# Patient Record
Sex: FEMALE | Race: WHITE | NOT HISPANIC OR LATINO | Employment: OTHER | URBAN - NONMETROPOLITAN AREA
[De-identification: names, ages, dates, MRNs, and addresses within clinical notes are randomized per-mention and may not be internally consistent; named-entity substitution may affect disease eponyms.]

---

## 2017-07-01 ENCOUNTER — HOSPITAL ENCOUNTER (EMERGENCY)
Facility: HOSPITAL | Age: 82
Discharge: HOME | End: 2017-07-01
Attending: FAMILY MEDICINE | Admitting: FAMILY MEDICINE
Payer: MEDICARE

## 2017-07-01 VITALS
SYSTOLIC BLOOD PRESSURE: 203 MMHG | RESPIRATION RATE: 16 BRPM | OXYGEN SATURATION: 94 % | TEMPERATURE: 98.1 F | HEART RATE: 78 BPM | DIASTOLIC BLOOD PRESSURE: 115 MMHG

## 2017-07-01 DIAGNOSIS — Z79.01 SUBTHERAPEUTIC ANTICOAGULATION: ICD-10-CM

## 2017-07-01 DIAGNOSIS — I10 ESSENTIAL HYPERTENSION, BENIGN: ICD-10-CM

## 2017-07-01 DIAGNOSIS — I48.0 PAROXYSMAL ATRIAL FIBRILLATION (H): ICD-10-CM

## 2017-07-01 DIAGNOSIS — Z51.81 SUBTHERAPEUTIC ANTICOAGULATION: ICD-10-CM

## 2017-07-01 DIAGNOSIS — I80.01 SUPERFICIAL THROMBOPHLEBITIS OF RIGHT LEG: ICD-10-CM

## 2017-07-01 LAB
ALBUMIN UR-MCNC: NEGATIVE MG/DL
ANION GAP SERPL CALCULATED.3IONS-SCNC: 7 MMOL/L (ref 3–14)
APPEARANCE UR: ABNORMAL
BACTERIA #/AREA URNS HPF: ABNORMAL /HPF
BASOPHILS # BLD AUTO: 0.1 10E9/L (ref 0–0.2)
BASOPHILS NFR BLD AUTO: 0.5 %
BILIRUB UR QL STRIP: NEGATIVE
BUN SERPL-MCNC: 23 MG/DL (ref 7–30)
CALCIUM SERPL-MCNC: 9.1 MG/DL (ref 8.5–10.1)
CHLORIDE SERPL-SCNC: 102 MMOL/L (ref 94–109)
CO2 SERPL-SCNC: 30 MMOL/L (ref 20–32)
COLOR UR AUTO: ABNORMAL
CREAT SERPL-MCNC: 0.96 MG/DL (ref 0.52–1.04)
DIFFERENTIAL METHOD BLD: ABNORMAL
EOSINOPHIL # BLD AUTO: 0.1 10E9/L (ref 0–0.7)
EOSINOPHIL NFR BLD AUTO: 0.8 %
ERYTHROCYTE [DISTWIDTH] IN BLOOD BY AUTOMATED COUNT: 14.3 % (ref 10–15)
GFR SERPL CREATININE-BSD FRML MDRD: 55 ML/MIN/1.7M2
GLUCOSE SERPL-MCNC: 149 MG/DL (ref 70–99)
GLUCOSE UR STRIP-MCNC: NEGATIVE MG/DL
HCT VFR BLD AUTO: 44.8 % (ref 35–47)
HGB BLD-MCNC: 14.8 G/DL (ref 11.7–15.7)
HGB UR QL STRIP: NEGATIVE
IMM GRANULOCYTES # BLD: 0.1 10E9/L (ref 0–0.4)
IMM GRANULOCYTES NFR BLD: 0.5 %
INR PPP: 1.09 (ref 0.8–1.2)
KETONES UR STRIP-MCNC: NEGATIVE MG/DL
LEUKOCYTE ESTERASE UR QL STRIP: NEGATIVE
LYMPHOCYTES # BLD AUTO: 1.9 10E9/L (ref 0.8–5.3)
LYMPHOCYTES NFR BLD AUTO: 15.6 %
MCH RBC QN AUTO: 30.9 PG (ref 26.5–33)
MCHC RBC AUTO-ENTMCNC: 33 G/DL (ref 31.5–36.5)
MCV RBC AUTO: 94 FL (ref 78–100)
MONOCYTES # BLD AUTO: 0.6 10E9/L (ref 0–1.3)
MONOCYTES NFR BLD AUTO: 5 %
NEUTROPHILS # BLD AUTO: 9.3 10E9/L (ref 1.6–8.3)
NEUTROPHILS NFR BLD AUTO: 77.6 %
NITRATE UR QL: NEGATIVE
NRBC # BLD AUTO: 0 10*3/UL
NRBC BLD AUTO-RTO: 0 /100
NT-PROBNP SERPL-MCNC: 1281 PG/ML (ref 0–1800)
PH UR STRIP: 7 PH (ref 4.7–8)
PLATELET # BLD AUTO: 187 10E9/L (ref 150–450)
POTASSIUM SERPL-SCNC: 4.2 MMOL/L (ref 3.4–5.3)
RBC # BLD AUTO: 4.79 10E12/L (ref 3.8–5.2)
RBC #/AREA URNS AUTO: 0 /HPF (ref 0–2)
SODIUM SERPL-SCNC: 139 MMOL/L (ref 133–144)
SP GR UR STRIP: 1 (ref 1–1.03)
TROPONIN I SERPL-MCNC: NORMAL UG/L (ref 0–0.04)
URN SPEC COLLECT METH UR: ABNORMAL
UROBILINOGEN UR STRIP-MCNC: NORMAL MG/DL (ref 0–2)
WBC # BLD AUTO: 11.9 10E9/L (ref 4–11)
WBC #/AREA URNS AUTO: 2 /HPF (ref 0–2)

## 2017-07-01 PROCEDURE — 83880 ASSAY OF NATRIURETIC PEPTIDE: CPT | Performed by: FAMILY MEDICINE

## 2017-07-01 PROCEDURE — 36415 COLL VENOUS BLD VENIPUNCTURE: CPT | Performed by: FAMILY MEDICINE

## 2017-07-01 PROCEDURE — 25000132 ZZH RX MED GY IP 250 OP 250 PS 637: Mod: GY | Performed by: FAMILY MEDICINE

## 2017-07-01 PROCEDURE — 84484 ASSAY OF TROPONIN QUANT: CPT | Performed by: FAMILY MEDICINE

## 2017-07-01 PROCEDURE — 85025 COMPLETE CBC W/AUTO DIFF WBC: CPT | Performed by: FAMILY MEDICINE

## 2017-07-01 PROCEDURE — A9270 NON-COVERED ITEM OR SERVICE: HCPCS | Mod: GY | Performed by: FAMILY MEDICINE

## 2017-07-01 PROCEDURE — 93005 ELECTROCARDIOGRAM TRACING: CPT

## 2017-07-01 PROCEDURE — 99285 EMERGENCY DEPT VISIT HI MDM: CPT | Mod: 25

## 2017-07-01 PROCEDURE — 81001 URINALYSIS AUTO W/SCOPE: CPT | Performed by: FAMILY MEDICINE

## 2017-07-01 PROCEDURE — 93971 EXTREMITY STUDY: CPT | Mod: TC,RT

## 2017-07-01 PROCEDURE — 93010 ELECTROCARDIOGRAM REPORT: CPT | Performed by: INTERNAL MEDICINE

## 2017-07-01 PROCEDURE — 99284 EMERGENCY DEPT VISIT MOD MDM: CPT | Performed by: FAMILY MEDICINE

## 2017-07-01 PROCEDURE — 85610 PROTHROMBIN TIME: CPT | Performed by: FAMILY MEDICINE

## 2017-07-01 PROCEDURE — 80048 BASIC METABOLIC PNL TOTAL CA: CPT | Performed by: FAMILY MEDICINE

## 2017-07-01 RX ORDER — LISINOPRIL 5 MG/1
10 TABLET ORAL ONCE
Status: COMPLETED | OUTPATIENT
Start: 2017-07-01 | End: 2017-07-01

## 2017-07-01 RX ADMIN — LISINOPRIL 10 MG: 5 TABLET ORAL at 16:38

## 2017-07-01 NOTE — ED AVS SNAPSHOT
[facility]    [address]    [place] [address]    Phone:  [phone]                                       Hali Baez   MRN: [id number]    Department:  [facility]   Date of Visit:  7/1/2017           After Visit Summary Signature Page     I have received my discharge instructions, and my questions have been answered. I have discussed any challenges I see with this plan with the nurse or doctor.    ..........................................................................................................................................  Patient/Patient Representative Signature      ..........................................................................................................................................  Patient Representative Print Name and Relationship to Patient    ..................................................               ................................................  Date                                            Time    ..........................................................................................................................................  Reviewed by Signature/Title    ...................................................              ..............................................  Date                                                            Time

## 2017-07-01 NOTE — ED PROVIDER NOTES
History     Chief Complaint   Patient presents with     Leg Pain     HPI  Hali Baez is a 89 year old female who came to the ED due to pain in the medial portion of the right calf that started Wednesday.  She has a history of atrial fibrillation, and has been on Coumadin for 2+ years, but states her INR has never stabilized, it is either too high or too low.  She has been in sinus rhythm when she has been checked since her initial a.fib episode, but today she is in a.fib again.  She is mainly concerned about the pain and the possibility of a dangerous clot.    I have reviewed the Medications, Allergies, Past Medical and Surgical History, and Social History in the Epic system.    Allergies:   Allergies   Allergen Reactions     Codeine      Penicillins          No current facility-administered medications on file prior to encounter.   Current Outpatient Prescriptions on File Prior to Encounter:  Warfarin Sodium (COUMADIN PO) Take 5 mg by mouth Daily except Saturdays.   Warfarin Sodium (COUMADIN PO) Take 2.5 mg by mouth Saturdays.   LEVOTHYROXINE SODIUM PO Take 100 mcg by mouth       There is no problem list on file for this patient.      History reviewed. No pertinent surgical history.    Social History   Substance Use Topics     Smoking status: Never Smoker     Smokeless tobacco: Not on file     Alcohol use Not on file         There is no immunization history on file for this patient.    BMI: There is no height or weight on file to calculate BMI.      Review of Systems   Constitutional: Positive for activity change and fatigue.        Not sleeping well due to pain.   HENT: Negative.    Respiratory: Negative for shortness of breath.    Cardiovascular: Negative for chest pain.   Gastrointestinal: Negative.    Musculoskeletal: Positive for myalgias.        Right medial calf   Neurological: Negative for dizziness and weakness.   Psychiatric/Behavioral: Negative.        Physical Exam   BP: 172/80  Pulse:  97  Temp: 98.4  F (36.9  C)  Resp: 16  SpO2: 94 %  Physical Exam   Constitutional: She is oriented to person, place, and time. She appears well-developed and well-nourished. No distress.   HENT:   Head: Normocephalic and atraumatic.   Cardiovascular: Normal rate, regular rhythm, normal heart sounds and intact distal pulses.    No murmur heard.  Pulmonary/Chest: Effort normal and breath sounds normal. No respiratory distress.   Abdominal: Soft. Bowel sounds are normal. She exhibits no distension.   Musculoskeletal: Normal range of motion. She exhibits no edema.   Neurological: She is alert and oriented to person, place, and time.   Skin: Skin is warm and dry. There is erythema.        Nursing note and vitals reviewed.      ED Course     ED Course     Procedures             EKG Interpretation:      Interpreted by Isabela Sanchez  Time reviewed: 1430  Symptoms at time of EKG: leg pain   Rhythm: atrial fibrillation - controlled  Rate: Normal  Axis: Normal  Ectopy: none  Conduction: normal  ST Segments/ T Waves: No acute ischemic changes and Non-specific ST-T wave changes  Q Waves: none  Comparison to prior: No old EKG available    Clinical Impression: atrial fibrillation (chronic)    Labs Ordered and Resulted from Time of ED Arrival Up to the Time of Departure from the ED   UA MACROSCOPIC WITH REFLEX TO MICRO AND CULTURE - Abnormal; Notable for the following:        Result Value    Bacteria Urine None (*)     All other components within normal limits   BASIC METABOLIC PANEL - Abnormal; Notable for the following:     Glucose 149 (*)     GFR Estimate 55 (*)     All other components within normal limits   CBC WITH PLATELETS DIFFERENTIAL - Abnormal; Notable for the following:     WBC 11.9 (*)     Absolute Neutrophil 9.3 (*)     All other components within normal limits   INR   NT PROBNP INPATIENT   TROPONIN I   VITAL SIGNS   PULSE OXIMETRY NURSING   CARDIAC CONTINUOUS MONITORING   PERIPHERAL IV CATHETER        Assessments & Plan (with Medical Decision Making)   Patient has superficial thrombophlebitis only.  US of remainder of RLE is negative.  Patient continued to be in atrial fibrillation despite fluids while here, but rate is 60-80, so no intervention is indicated.  INR is 1.09, so certainly not therapeutic.  Spoke with Dr. Graham, patient instructed to take 10mg Coumadin today and tomorrow and see him on Monday in clinic.  Blood pressure elevated, patient given Lisinopril 10mg, which she apparently was supposed to be on but never picked up.  She does not remember being told she needed a blood pressure medication.    I have reviewed the nursing notes.    I have reviewed the findings, diagnosis, plan and need for follow up with the patient.       New Prescriptions    No medications on file       Final diagnoses:   Superficial thrombophlebitis of right leg   Subtherapeutic anticoagulation   Paroxysmal atrial fibrillation (H)   Essential hypertension, benign       7/1/2017   [facility]     Isabela Finn MD  07/01/17 1635       Isabela Finn MD  07/01/17 1641       Isabela Finn MD  07/04/17 1218

## 2017-07-01 NOTE — ED AVS SNAPSHOT
" [facility]    [address] [address]    Phone:  [phone]                                       Hali Baez   MRN: [id number]    Department:  [facility]   Date of Visit:  7/1/2017           Patient Information     Date Of Birth          7/3/1927        Your diagnoses for this visit were:     Superficial thrombophlebitis of right leg     Subtherapeutic anticoagulation     Paroxysmal atrial fibrillation (H)        You were seen by Isabela Finn MD.      Follow-up Information     Follow up with Zuhair Julio MD. Go in 2 days.    Specialty:  Family Practice    Why:  call for appointment first thing Monday morning \"ER follow up\"    Contact information:    [facility]  [address] [address]  [phone]          Discharge Instructions       TAKE 10MG COUMADIN TONIGHT AND TOMORROW, FOLLOW UP WITH DR. JULIO FOR REPEAT INR AND DOSING OF MEDICATION ON Monday.    Discharge References/Attachments     THROMBOPHLEBITIS, SUPERFICIAL (ENGLISH)    FIBRILLATION, ATRIAL (ENGLISH)         Review of your medicines      Our records show that you are taking the medicines listed below. If these are incorrect, please call your family doctor or clinic.        Dose / Directions Last dose taken    * COUMADIN PO   Dose:  5 mg        Take 5 mg by mouth Daily except Saturdays.   Refills:  0        * COUMADIN PO   Dose:  2.5 mg        Take 2.5 mg by mouth Saturdays.   Refills:  0        LASIX PO   Dose:  20 mg        Take 20 mg by mouth daily   Refills:  0        LEVOTHYROXINE SODIUM PO   Dose:  100 mcg        Take 100 mcg by mouth   Refills:  0        * Notice:  This list has 2 medication(s) that are the same as other medications prescribed for you. Read the directions carefully, and ask your doctor or other care provider to review them with you.            Procedures and tests performed during your visit     Basic metabolic panel    CBC " "with platelets differential    Cardiac Continuous Monitoring    EKG 12 lead    INR    Nt probnp inpatient (BNP)    Peripheral IV catheter    Pulse oximetry nursing    Troponin I    UA reflex to Microscopic and Culture    US Lower Extremity Venous Duplex Right    Vital signs      Orders Needing Specimen Collection     None      Pending Results     Date and Time Order Name Status Description    2017 1511 US Lower Extremity Venous Duplex Right In process             Pending Culture Results     No orders found from 2017 to 2017.            Thank you for choosing [facility]       Thank you for choosing [facility] for your care. Our goal is always to provide you with excellent care. Hearing back from our patients is one way we can continue to improve our services. Please take a few minutes to complete the written survey that you may receive in the mail after you visit with us. Thank you!        [employer] Information     [employer] lets you send messages to your doctor, view your test results, renew your prescriptions, schedule appointments and more. To sign up, go to [email or web address] . Click on \"Log in\" on the left side of the screen, which will take you to the Welcome page. Then click on \"Sign up Now\" on the right side of the page.     You will be asked to enter the access code listed below, as well as some personal information. Please follow the directions to create your username and password.     Your access code is: [email or web address]  Expires: 2017  4:22 PM     Your access code will  in 90 days. If you need help or a new code, please call your [facility] clinic or [phone].        Care EveryWhere ID     This is your Care EveryWhere ID. This could be used by other organizations to access your [facility] medical records  [id number]        Equal Access to Services     YEMI PABON AH: Hadii catalina Garrett, wanessa crespo, sidney fajardo. So " [facility] [phone].    ATENCIÓN: Si habla español, tiene a barrett disposición servicios gratuitos de asistencia lingüística. Llame al [phone].    We comply with applicable federal civil rights laws and [place] laws. We do not discriminate on the basis of race, color, national origin, age, disability sex, sexual orientation or gender identity.            After Visit Summary       This is your record. Keep this with you and show to your community pharmacist(s) and doctor(s) at your next visit.

## 2017-07-01 NOTE — DISCHARGE INSTRUCTIONS
TAKE 10MG COUMADIN TONIGHT AND TOMORROW, FOLLOW UP WITH DR. JULIO FOR REPEAT INR AND DOSING OF MEDICATION ON Monday.

## 2017-07-04 ASSESSMENT — ENCOUNTER SYMPTOMS
DIZZINESS: 0
GASTROINTESTINAL NEGATIVE: 1
MYALGIAS: 1
ACTIVITY CHANGE: 1
FATIGUE: 1
PSYCHIATRIC NEGATIVE: 1
SHORTNESS OF BREATH: 0
WEAKNESS: 0

## 2017-12-06 ENCOUNTER — HOSPITAL ENCOUNTER (EMERGENCY)
Facility: HOSPITAL | Age: 82
Discharge: HOME | End: 2017-12-06
Attending: PHYSICIAN ASSISTANT | Admitting: PHYSICIAN ASSISTANT
Payer: MEDICARE

## 2017-12-06 ENCOUNTER — APPOINTMENT (OUTPATIENT)
Dept: GENERAL RADIOLOGY | Facility: HOSPITAL | Age: 82
End: 2017-12-06
Attending: PHYSICIAN ASSISTANT
Payer: MEDICARE

## 2017-12-06 ENCOUNTER — APPOINTMENT (OUTPATIENT)
Dept: CT IMAGING | Facility: HOSPITAL | Age: 82
End: 2017-12-06
Attending: PHYSICIAN ASSISTANT
Payer: MEDICARE

## 2017-12-06 VITALS
SYSTOLIC BLOOD PRESSURE: 185 MMHG | RESPIRATION RATE: 16 BRPM | HEART RATE: 79 BPM | OXYGEN SATURATION: 95 % | DIASTOLIC BLOOD PRESSURE: 89 MMHG | TEMPERATURE: 97.9 F

## 2017-12-06 DIAGNOSIS — S42.201A CLOSED FRACTURE OF PROXIMAL END OF RIGHT HUMERUS, UNSPECIFIED FRACTURE MORPHOLOGY, INITIAL ENCOUNTER: ICD-10-CM

## 2017-12-06 LAB
ANION GAP SERPL CALCULATED.3IONS-SCNC: 5 MMOL/L (ref 3–14)
APTT PPP: 34 SEC (ref 24–37)
BASOPHILS # BLD AUTO: 0 10E9/L (ref 0–0.2)
BASOPHILS NFR BLD AUTO: 0.2 %
BUN SERPL-MCNC: 28 MG/DL (ref 7–30)
CALCIUM SERPL-MCNC: 9 MG/DL (ref 8.5–10.1)
CHLORIDE SERPL-SCNC: 104 MMOL/L (ref 94–109)
CO2 SERPL-SCNC: 29 MMOL/L (ref 20–32)
CREAT SERPL-MCNC: 1.12 MG/DL (ref 0.52–1.04)
DIFFERENTIAL METHOD BLD: ABNORMAL
EOSINOPHIL # BLD AUTO: 0 10E9/L (ref 0–0.7)
EOSINOPHIL NFR BLD AUTO: 0.2 %
ERYTHROCYTE [DISTWIDTH] IN BLOOD BY AUTOMATED COUNT: 12.5 % (ref 10–15)
GFR SERPL CREATININE-BSD FRML MDRD: 46 ML/MIN/1.7M2
GLUCOSE SERPL-MCNC: 152 MG/DL (ref 70–99)
HCT VFR BLD AUTO: 38.8 % (ref 35–47)
HGB BLD-MCNC: 13 G/DL (ref 11.7–15.7)
IMM GRANULOCYTES # BLD: 0.1 10E9/L (ref 0–0.4)
IMM GRANULOCYTES NFR BLD: 0.9 %
INR PPP: 1.16 (ref 0.8–1.2)
LYMPHOCYTES # BLD AUTO: 1.3 10E9/L (ref 0.8–5.3)
LYMPHOCYTES NFR BLD AUTO: 9.7 %
MCH RBC QN AUTO: 32.3 PG (ref 26.5–33)
MCHC RBC AUTO-ENTMCNC: 33.5 G/DL (ref 31.5–36.5)
MCV RBC AUTO: 96 FL (ref 78–100)
MONOCYTES # BLD AUTO: 0.4 10E9/L (ref 0–1.3)
MONOCYTES NFR BLD AUTO: 3.3 %
NEUTROPHILS # BLD AUTO: 11.4 10E9/L (ref 1.6–8.3)
NEUTROPHILS NFR BLD AUTO: 85.7 %
NRBC # BLD AUTO: 0 10*3/UL
NRBC BLD AUTO-RTO: 0 /100
PLATELET # BLD AUTO: 152 10E9/L (ref 150–450)
POTASSIUM SERPL-SCNC: 4.8 MMOL/L (ref 3.4–5.3)
RBC # BLD AUTO: 4.03 10E12/L (ref 3.8–5.2)
SODIUM SERPL-SCNC: 138 MMOL/L (ref 133–144)
WBC # BLD AUTO: 13.3 10E9/L (ref 4–11)

## 2017-12-06 PROCEDURE — 99284 EMERGENCY DEPT VISIT MOD MDM: CPT | Mod: 25

## 2017-12-06 PROCEDURE — 85610 PROTHROMBIN TIME: CPT | Performed by: PHYSICIAN ASSISTANT

## 2017-12-06 PROCEDURE — 85025 COMPLETE CBC W/AUTO DIFF WBC: CPT | Performed by: PHYSICIAN ASSISTANT

## 2017-12-06 PROCEDURE — 80048 BASIC METABOLIC PNL TOTAL CA: CPT | Performed by: PHYSICIAN ASSISTANT

## 2017-12-06 PROCEDURE — 73200 CT UPPER EXTREMITY W/O DYE: CPT | Mod: TC,RT

## 2017-12-06 PROCEDURE — 99283 EMERGENCY DEPT VISIT LOW MDM: CPT | Performed by: PHYSICIAN ASSISTANT

## 2017-12-06 PROCEDURE — 85730 THROMBOPLASTIN TIME PARTIAL: CPT | Performed by: PHYSICIAN ASSISTANT

## 2017-12-06 PROCEDURE — 36415 COLL VENOUS BLD VENIPUNCTURE: CPT | Performed by: PHYSICIAN ASSISTANT

## 2017-12-06 PROCEDURE — 99203 OFFICE O/P NEW LOW 30 MIN: CPT | Performed by: ORTHOPAEDIC SURGERY

## 2017-12-06 PROCEDURE — 73030 X-RAY EXAM OF SHOULDER: CPT | Mod: TC,RT

## 2017-12-06 RX ORDER — HYDROCODONE BITARTRATE AND ACETAMINOPHEN 5; 325 MG/1; MG/1
1-2 TABLET ORAL EVERY 4 HOURS PRN
Qty: 20 TABLET | Refills: 0 | Status: SHIPPED | OUTPATIENT
Start: 2017-12-06 | End: 2020-09-16

## 2017-12-06 ASSESSMENT — ENCOUNTER SYMPTOMS
HEADACHES: 0
BRUISES/BLEEDS EASILY: 0
DIZZINESS: 0
WEAKNESS: 0
SHORTNESS OF BREATH: 0
ABDOMINAL PAIN: 0
NUMBNESS: 0
BACK PAIN: 0
NECK PAIN: 0
LIGHT-HEADEDNESS: 0
NAUSEA: 0
VOMITING: 0

## 2017-12-06 NOTE — ED AVS SNAPSHOT
[facility]    [address] [address]    Phone:  [phone]                                       Hali Baez   MRN: [id number]    Department[facility]  [facility]   Date of Visit:  12/6/2017           Patient Information     Date Of Birth          7/3/1927        Your diagnoses for this visit were:     Closed fracture of proximal end of right humerus, unspecified fracture morphology, initial encounter        You were seen by Mel Wade PA-C.      Follow-up Information     Follow up with [facility] In 2 days.    Why:  at 11:15     Contact information:    [address] [address] [phone]          Follow up with [facility].    Specialty:  EMERGENCY MEDICINE    Why:  If symptoms worsen    Contact information:    [address] [address] [phone]    Additional information:    From [place] Area: Take [address]. Turn left at [address]. Turn left at the first stoplight on [address]. At the first stop sign, take a right onto [address]. Take a left into the parking lot and continue through until you reach the North enterance of the building.       From [place]: Take [address]. Take the [address] ramp towards [place]. Turn left onto [address]. Take a slight right onto [address]. Turn left at the first stoplight on [address]. At the first stop sign, take a right onto [address]. Take a left into the parking lot and continue through until you reach the North enterance of the building.       From [place]: Take [address]. Take a right at [address]. At the first stop sign, take a right onto [address]. Take a left into the parking lot and continue through until you reach the North enterance of the building.         Discharge Instructions       Stay in the shoulder immobilizer for  comfort.    Hydrocodone for pain.      Please see Dr. Elizabeth at the clinic on Friday at 11:15 am.     Please fell free to return here for ANY worsening pain or other concerns.        Review of your medicines      START taking        Dose / Directions Last dose taken    HYDROcodone-acetaminophen 5-325 MG per tablet   Commonly known as:  NORCO   Dose:  1-2 tablet   Quantity:  20 tablet        Take 1-2 tablets by mouth every 4 hours as needed for moderate to severe pain   Refills:  0          Our records show that you are taking the medicines listed below. If these are incorrect, please call your family doctor or clinic.        Dose / Directions Last dose taken    LASIX PO   Dose:  20 mg        Take 20 mg by mouth daily   Refills:  0        LEVOTHYROXINE SODIUM PO   Dose:  100 mcg        Take 100 mcg by mouth   Refills:  0        PRADAXA PO   Dose:  75 mg        Take 75 mg by mouth daily   Refills:  0                Prescriptions were sent or printed at these locations (1 Prescription)                   Other Prescriptions                Printed at Department/Unit printer (1 of 1)         HYDROcodone-acetaminophen (NORCO) 5-325 MG per tablet                Procedures and tests performed during your visit     Basic metabolic panel    CBC with platelets differential    CT Shoulder Right w/o Contrast    INR    Partial thromboplastin time    Peripheral IV: Standard    XR Shoulder Right G/E 3 Views      Orders Needing Specimen Collection     None      Pending Results     Date and Time Order Name Status Description    12/6/2017 1526 Basic metabolic panel In process             Pending Culture Results     No orders found from 12/4/2017 to 12/7/2017.            Thank you for choosing [facility]       Thank you for choosing [facility] for your care. Our goal is always to provide you with excellent care. Hearing back from our patients is one way we can continue to improve our services. Please take a few minutes to complete the  "written survey that you may receive in the mail after you visit with us. Thank you!        [employer] Information     [employer] lets you send messages to your doctor, view your test results, renew your prescriptions, schedule appointments and more. To sign up, go to [email or web address] . Click on \"Log in\" on the left side of the screen, which will take you to the Welcome page. Then click on \"Sign up Now\" on the right side of the page.     You will be asked to enter the access code listed below, as well as some personal information. Please follow the directions to create your username and password.     Your access code is: [email or web address]  Expires: 3/6/2018  4:42 PM     Your access code will  in 90 days. If you need help or a new code, please call your [facility] clinic or [phone].        Care EveryWhere ID     This is your Care EveryWhere ID. This could be used by other organizations to access your [facility] medical records  [id number]        Equal Access to Services     YEMI PABON : Hadii catalina garcia Sosteffen, waaxda luqadaha, qaybta kaalkody johnson, sidney rausch . So [facility] [phone].    ATENCIÓN: Si habla español, tiene a barrett disposición servicios gratuitos de asistencia lingüística. Llame al [phone].    We comply with applicable federal civil rights laws and [place] laws. We do not discriminate on the basis of race, color, national origin, age, disability, sex, sexual orientation, or gender identity.            After Visit Summary       This is your record. Keep this with you and show to your community pharmacist(s) and doctor(s) at your next visit.                  "

## 2017-12-06 NOTE — DISCHARGE INSTRUCTIONS
Stay in the shoulder immobilizer for comfort.    Hydrocodone for pain.      Please see Dr. Elizabeth at the clinic on Friday at 11:15 am.     Please fell free to return here for ANY worsening pain or other concerns.

## 2017-12-06 NOTE — ED PROVIDER NOTES
History     Chief Complaint   Patient presents with     Fall     rt shoulder pain, notes tripped and fell. states taking a blood thinner     The history is provided by the patient and the spouse.     Hali Baez is a 89 year old female who presented to the ED along with  for evaluation of a fall.  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  She fell backwards with an outstretched right arm.  She did not hit her head.  No neck pain.  Only complaint is the right shoulder.  No chest pain.  No headaches.  She does take Pradaxa.      Problem List:    There are no active problems to display for this patient.       Past Medical History:    History reviewed. No pertinent past medical history.    Past Surgical History:    History reviewed. No pertinent surgical history.    Family History:    No family history on file.    Social History:  Marital Status:   [2]  Social History   Substance Use Topics     Smoking status: Never Smoker     Smokeless tobacco: Not on file     Alcohol use Not on file        Medications:      Dabigatran Etexilate Mesylate (PRADAXA PO)   HYDROcodone-acetaminophen (NORCO) 5-325 MG per tablet   Furosemide (LASIX PO)   LEVOTHYROXINE SODIUM PO         Review of Systems   Respiratory: Negative for shortness of breath.    Cardiovascular: Negative for chest pain.   Gastrointestinal: Negative for abdominal pain, nausea and vomiting.   Genitourinary: Negative.    Musculoskeletal: Negative for back pain and neck pain.   Skin: Negative.    Neurological: Negative for dizziness, weakness, light-headedness, numbness and headaches.   Hematological: Does not bruise/bleed easily.       Physical Exam   BP: 174/96  Heart Rate: 88  Temp: 96.9  F (36.1  C)  Resp: 18  SpO2: 95 %      Physical Exam   Constitutional: She is oriented to person, place, and time. She appears well-developed and well-nourished. No distress.   Pleasant and talkative   HENT:   Head: Normocephalic and  atraumatic.   No evidence of head injury    Neck: Normal range of motion. Neck supple.   No midline cervical spine tenderness, step-off, or crepitus.    Cardiovascular: Normal rate.    Pulmonary/Chest: Effort normal. No respiratory distress. She exhibits no tenderness.   Abdominal: Soft. There is no tenderness.   Musculoskeletal: She exhibits tenderness and deformity.   Deformity of the right shoulder.  Clavicle is unremarkable    Neurological: She is alert and oriented to person, place, and time.   Skin: Skin is warm and dry.   Psychiatric: She has a normal mood and affect.   Nursing note and vitals reviewed.      ED Course     ED Course     Procedures          Medications - No data to display     Results for orders placed or performed during the hospital encounter of 12/06/17   XR Shoulder Right G/E 3 Views    Narrative    Exam: XR SHOULDER RT G/E 3 VW     History:Female, age 89 years, pain after fall;     Comparison:  Chest x-ray 5/2/2011    Technique: Three views are submitted.    Findings: Bones mildly osteopenic. Comminuted fracture involving the  right humeral head, extending into the surgical neck.     Limited evaluation suggests anterior dislocation of the humerus in  relation to the glenoid. Positioning the patient was difficult  secondary to discomfort.           Impression    Impression:  1.  Comminuted fractures involving the right humeral head extending  into the surgical neck with likely anterior glenohumeral dislocation.     2.  No evidence of acute rib fracture.    LIZETH GHOSH MD   CT Shoulder Right w/o Contrast    Narrative    PROCEDURE: CT SHOULDER RIGHT W/O CONTRAST 12/6/2017 4:07 PM    HISTORY: fracture anatomy;     COMPARISONS: None.    Meds/Dose Given:    TECHNIQUE: CT scan of the right shoulder with sagittal and coronal  reconstructions    FINDINGS: There is an oblique fracture of the proximal femoral neck.  Major distal fracture fragment is impacted approximately by 9 mm and  displaced  laterally by 5 mm. The humeral head is subluxed inferiorly  in relation to the glenoid. The lower rim of the glenoid lies in  approximately the midportion of the articular surface of the humeral  head. This been a fracture of the greater tubercle   The scapular  blade appears normal. Acromioclavicular joint is normally aligned.         Impression    IMPRESSION: Comminuted proximal humeral fracture    WATSON CARRANZA MD   CBC with platelets differential   Result Value Ref Range    WBC 13.3 (H) 4.0 - 11.0 10e9/L    RBC Count 4.03 3.8 - 5.2 10e12/L    Hemoglobin 13.0 11.7 - 15.7 g/dL    Hematocrit 38.8 35.0 - 47.0 %    MCV 96 78 - 100 fl    MCH 32.3 26.5 - 33.0 pg    MCHC 33.5 31.5 - 36.5 g/dL    RDW 12.5 10.0 - 15.0 %    Platelet Count 152 150 - 450 10e9/L    Diff Method Automated Method     % Neutrophils 85.7 %    % Lymphocytes 9.7 %    % Monocytes 3.3 %    % Eosinophils 0.2 %    % Basophils 0.2 %    % Immature Granulocytes 0.9 %    Nucleated RBCs 0 0 /100    Absolute Neutrophil 11.4 (H) 1.6 - 8.3 10e9/L    Absolute Lymphocytes 1.3 0.8 - 5.3 10e9/L    Absolute Monocytes 0.4 0.0 - 1.3 10e9/L    Absolute Eosinophils 0.0 0.0 - 0.7 10e9/L    Absolute Basophils 0.0 0.0 - 0.2 10e9/L    Abs Immature Granulocytes 0.1 0 - 0.4 10e9/L    Absolute Nucleated RBC 0.0    INR   Result Value Ref Range    INR 1.16 0.80 - 1.20   Partial thromboplastin time   Result Value Ref Range    PTT 34 24.00 - 37.00 sec        Critical Care time:  none               Labs Ordered and Resulted from Time of ED Arrival Up to the Time of Departure from the ED   CBC WITH PLATELETS DIFFERENTIAL - Abnormal; Notable for the following:        Result Value    WBC 13.3 (*)     Absolute Neutrophil 11.4 (*)     All other components within normal limits   BASIC METABOLIC PANEL   INR   PARTIAL THROMBOPLASTIN TIME   PERIPHERAL IV CATHETER       Assessments & Plan (with Medical Decision Making)   Work-up as above.  Evaluated by Dr. Camacho.  Surgery not  emergent and he believes at she requires a total shoulder. This is not done at our facility.  On Pradaxa.  Discussed with Dr. Fields who recommended Dr. Elizabeth.  We set her up to see him int he clinic on Friday.  Shoulder immobilizer here.  Pain medications as needed.  None required in the ED.  Return here for any other concerns. Ms. Crockett voiced complete understanding and was happy and agreeable.     I have reviewed the nursing notes.    I have reviewed the findings, diagnosis, plan and need for follow up with the patient.       New Prescriptions    HYDROCODONE-ACETAMINOPHEN (NORCO) 5-325 MG PER TABLET    Take 1-2 tablets by mouth every 4 hours as needed for moderate to severe pain       Final diagnoses:   Closed fracture of proximal end of right humerus, unspecified fracture morphology, initial encounter       12/6/2017   [facility]     Mel Wade PA-C  12/06/17 6979

## 2017-12-06 NOTE — ED AVS SNAPSHOT
[facility]    [address]    [place] [address]    Phone:  [phone]                                       Hali Baez   MRN: [id number]    Department:  [facility]   Date of Visit:  12/6/2017           After Visit Summary Signature Page     I have received my discharge instructions, and my questions have been answered. I have discussed any challenges I see with this plan with the nurse or doctor.    ..........................................................................................................................................  Patient/Patient Representative Signature      ..........................................................................................................................................  Patient Representative Print Name and Relationship to Patient    ..................................................               ................................................  Date                                            Time    ..........................................................................................................................................  Reviewed by Signature/Title    ...................................................              ..............................................  Date                                                            Time

## 2017-12-06 NOTE — ED NOTES
Patient presents via private car accompanied by  with complaint of right shoulder pain post fall. Patient reports she was transporting a microwave on a cart when she tripped and fell on the cart wheel. Patient is on Coumadin, reports she did not hit her head or lose consciousness. Pain reports pain to right shoulder is very minimal while resting on bed, however with any movement is 10/10. CMS is intact to right upper extremity. Swelling noted, no obvious deformity. Denies any other complaints at this time. Attached to monitors and call light within reach.

## 2017-12-06 NOTE — CONSULTS
Orthopedic ED Consultation    Chief complaint: Right shoulder fracture    History of present injury: This 89-year-old right-handed female was moving an appliance on a rolling cart in her home when she tripped over the wheel of the cart and fell landing on her right shoulder today.  She presented to the ED complaining of severe right shoulder pain but denies numbness or tingling in the right hand.  X-rays revealed a comminuted proximal humerus fracture.  A CT scan was ordered and an orthopedic consultation was requested.    Her medical history is significant for being on Pradaxa.  She has never been tested for osteoporosis.  She is not on calcium or vitamin D supplements.    Objective: Healthy-appearing elderly female in no obvious distress.  She is awake alert and oriented.  The skin around the right shoulder is intact.  Right shoulder is tender to palpation.  Any attempt to move the shoulder causes severe pain.  Active motion of the right elbow wrist and fingers are full and pain-free.  The neurovascular status the right hand is intact.    X-ray; the plain films the right shoulder taken today show a comminuted proximal humerus fracture with inferior subluxation of the humeral head.  Her bones look osteopenic if not osteoporotic.  The CT scan of the right shoulder taken today shows the fracture is very comminuted and involves the medial neck.  Both the surgical neck and the anatomic neck of the humerus are involved.  The articular surface fragments is comminuted superiorly.  It is not dislocated.    Impression: Comminuted proximal humerus fracture in osteopenic bone in an elderly female on an anticoagulant.    Plan: My opinion is this fracture is not repairable given the osteopenia/osteoporosis and the involvement of the medial and anatomic necks.  Any internal fixation would be unstable.  I suspect she needs an arthroplasty, possibly a reverse total shoulder.  We do not have implants or instruments for shoulder  hemiarthroplasties or reverse total shoulders here and none of the orthopedists at this institution have been trained in reverse total shoulders.  I therefore recommend that she be referred to an orthopedic trauma surgeon in [place].  Given that the shoulder is not dislocated, she may not have to see the surgeon tonight, as waiting a few days would allow her anticoagulant to wear off and decrease bleeding, but that would be the on-call orthopedist's decision.  She should be placed in a shoulder immobilizer today and arrangements made for a timely referral.  She understands and accepts this.  This information was verbally communicated to the ED provider.

## 2018-01-03 ENCOUNTER — APPOINTMENT (OUTPATIENT)
Dept: ANESTHESIOLOGY | Facility: HOSPITAL | Age: 83
End: 2018-01-03
Attending: ORTHOPAEDIC SURGERY
Payer: COMMERCIAL

## 2018-01-03 PROCEDURE — 01620 ANES CLSD PX SHOULDER JOINT: CPT | Mod: QZ | Performed by: NURSE ANESTHETIST, CERTIFIED REGISTERED

## 2018-01-03 PROCEDURE — 99100 ANES PT EXTEME AGE<1 YR&>70: CPT | Performed by: NURSE ANESTHETIST, CERTIFIED REGISTERED

## 2020-09-16 ENCOUNTER — APPOINTMENT (OUTPATIENT)
Dept: CT IMAGING | Facility: HOSPITAL | Age: 85
End: 2020-09-16
Attending: PHYSICIAN ASSISTANT
Payer: MEDICARE

## 2020-09-16 ENCOUNTER — APPOINTMENT (OUTPATIENT)
Dept: GENERAL RADIOLOGY | Facility: HOSPITAL | Age: 85
End: 2020-09-16
Attending: PHYSICIAN ASSISTANT
Payer: MEDICARE

## 2020-09-16 ENCOUNTER — HOSPITAL ENCOUNTER (EMERGENCY)
Facility: HOSPITAL | Age: 85
Discharge: HOME | End: 2020-09-16
Attending: PHYSICIAN ASSISTANT | Admitting: PHYSICIAN ASSISTANT
Payer: MEDICARE

## 2020-09-16 VITALS
DIASTOLIC BLOOD PRESSURE: 83 MMHG | TEMPERATURE: 98.2 F | RESPIRATION RATE: 18 BRPM | OXYGEN SATURATION: 97 % | SYSTOLIC BLOOD PRESSURE: 148 MMHG | HEART RATE: 74 BPM

## 2020-09-16 DIAGNOSIS — R11.2 NAUSEA AND VOMITING: ICD-10-CM

## 2020-09-16 DIAGNOSIS — I10 BENIGN ESSENTIAL HYPERTENSION: ICD-10-CM

## 2020-09-16 DIAGNOSIS — E03.9 HYPOTHYROIDISM: ICD-10-CM

## 2020-09-16 LAB
ALBUMIN SERPL-MCNC: 4 G/DL (ref 3.4–5)
ALBUMIN UR-MCNC: NEGATIVE MG/DL
ALP SERPL-CCNC: 87 U/L (ref 40–150)
ALT SERPL W P-5'-P-CCNC: 15 U/L (ref 0–50)
ANION GAP SERPL CALCULATED.3IONS-SCNC: 7 MMOL/L (ref 3–14)
APPEARANCE UR: CLEAR
AST SERPL W P-5'-P-CCNC: 12 U/L (ref 0–45)
BASOPHILS # BLD AUTO: 0.1 10E9/L (ref 0–0.2)
BASOPHILS NFR BLD AUTO: 0.3 %
BILIRUB SERPL-MCNC: 0.9 MG/DL (ref 0.2–1.3)
BILIRUB UR QL STRIP: NEGATIVE
BUN SERPL-MCNC: 22 MG/DL (ref 7–30)
CALCIUM SERPL-MCNC: 8.9 MG/DL (ref 8.5–10.1)
CHLORIDE SERPL-SCNC: 96 MMOL/L (ref 94–109)
CO2 SERPL-SCNC: 28 MMOL/L (ref 20–32)
COLOR UR AUTO: NORMAL
CREAT SERPL-MCNC: 1.04 MG/DL (ref 0.52–1.04)
DIFFERENTIAL METHOD BLD: ABNORMAL
EOSINOPHIL # BLD AUTO: 0 10E9/L (ref 0–0.7)
EOSINOPHIL NFR BLD AUTO: 0.1 %
ERYTHROCYTE [DISTWIDTH] IN BLOOD BY AUTOMATED COUNT: 13.9 % (ref 10–15)
GFR SERPL CREATININE-BSD FRML MDRD: 46 ML/MIN/{1.73_M2}
GLUCOSE SERPL-MCNC: 107 MG/DL (ref 70–99)
GLUCOSE UR STRIP-MCNC: NEGATIVE MG/DL
HCT VFR BLD AUTO: 44.3 % (ref 35–47)
HGB BLD-MCNC: 15 G/DL (ref 11.7–15.7)
HGB UR QL STRIP: NEGATIVE
IMM GRANULOCYTES # BLD: 0.2 10E9/L (ref 0–0.4)
IMM GRANULOCYTES NFR BLD: 1 %
KETONES UR STRIP-MCNC: NEGATIVE MG/DL
LEUKOCYTE ESTERASE UR QL STRIP: NEGATIVE
LIPASE SERPL-CCNC: 65 U/L (ref 73–393)
LYMPHOCYTES # BLD AUTO: 2.7 10E9/L (ref 0.8–5.3)
LYMPHOCYTES NFR BLD AUTO: 15.1 %
MAGNESIUM SERPL-MCNC: 2.5 MG/DL (ref 1.6–2.3)
MCH RBC QN AUTO: 31.8 PG (ref 26.5–33)
MCHC RBC AUTO-ENTMCNC: 33.9 G/DL (ref 31.5–36.5)
MCV RBC AUTO: 94 FL (ref 78–100)
MONOCYTES # BLD AUTO: 0.6 10E9/L (ref 0–1.3)
MONOCYTES NFR BLD AUTO: 3.4 %
NEUTROPHILS # BLD AUTO: 14.2 10E9/L (ref 1.6–8.3)
NEUTROPHILS NFR BLD AUTO: 80.1 %
NITRATE UR QL: NEGATIVE
NRBC # BLD AUTO: 0 10*3/UL
NRBC BLD AUTO-RTO: 0 /100
PH UR STRIP: 6.5 PH (ref 4.7–8)
PLATELET # BLD AUTO: 230 10E9/L (ref 150–450)
POTASSIUM SERPL-SCNC: 3.5 MMOL/L (ref 3.4–5.3)
PROT SERPL-MCNC: 8.4 G/DL (ref 6.8–8.8)
RBC # BLD AUTO: 4.71 10E12/L (ref 3.8–5.2)
SODIUM SERPL-SCNC: 131 MMOL/L (ref 133–144)
SOURCE: NORMAL
SP GR UR STRIP: 1.01 (ref 1–1.03)
T4 FREE SERPL-MCNC: 0.65 NG/DL (ref 0.76–1.46)
TROPONIN I SERPL-MCNC: <0.015 UG/L (ref 0–0.04)
TSH SERPL DL<=0.005 MIU/L-ACNC: 98.54 MU/L (ref 0.4–4)
UROBILINOGEN UR STRIP-MCNC: NORMAL MG/DL (ref 0–2)
WBC # BLD AUTO: 17.7 10E9/L (ref 4–11)

## 2020-09-16 PROCEDURE — 25000128 H RX IP 250 OP 636: Performed by: PHYSICIAN ASSISTANT

## 2020-09-16 PROCEDURE — 25000132 ZZH RX MED GY IP 250 OP 250 PS 637: Mod: GY | Performed by: PHYSICIAN ASSISTANT

## 2020-09-16 PROCEDURE — 99285 EMERGENCY DEPT VISIT HI MDM: CPT | Mod: 25

## 2020-09-16 PROCEDURE — 99285 EMERGENCY DEPT VISIT HI MDM: CPT | Mod: Z6 | Performed by: PHYSICIAN ASSISTANT

## 2020-09-16 PROCEDURE — 85025 COMPLETE CBC W/AUTO DIFF WBC: CPT | Performed by: PHYSICIAN ASSISTANT

## 2020-09-16 PROCEDURE — 36415 COLL VENOUS BLD VENIPUNCTURE: CPT | Performed by: PHYSICIAN ASSISTANT

## 2020-09-16 PROCEDURE — 70450 CT HEAD/BRAIN W/O DYE: CPT | Mod: TC

## 2020-09-16 PROCEDURE — 80053 COMPREHEN METABOLIC PANEL: CPT | Performed by: PHYSICIAN ASSISTANT

## 2020-09-16 PROCEDURE — 96374 THER/PROPH/DIAG INJ IV PUSH: CPT

## 2020-09-16 PROCEDURE — 71045 X-RAY EXAM CHEST 1 VIEW: CPT | Mod: TC

## 2020-09-16 PROCEDURE — 96376 TX/PRO/DX INJ SAME DRUG ADON: CPT

## 2020-09-16 PROCEDURE — 84439 ASSAY OF FREE THYROXINE: CPT | Performed by: PHYSICIAN ASSISTANT

## 2020-09-16 PROCEDURE — 84443 ASSAY THYROID STIM HORMONE: CPT | Performed by: PHYSICIAN ASSISTANT

## 2020-09-16 PROCEDURE — 81003 URINALYSIS AUTO W/O SCOPE: CPT | Performed by: PHYSICIAN ASSISTANT

## 2020-09-16 PROCEDURE — 25800030 ZZH RX IP 258 OP 636: Performed by: PHYSICIAN ASSISTANT

## 2020-09-16 PROCEDURE — 83735 ASSAY OF MAGNESIUM: CPT | Performed by: PHYSICIAN ASSISTANT

## 2020-09-16 PROCEDURE — 83690 ASSAY OF LIPASE: CPT | Performed by: PHYSICIAN ASSISTANT

## 2020-09-16 PROCEDURE — 84484 ASSAY OF TROPONIN QUANT: CPT | Performed by: PHYSICIAN ASSISTANT

## 2020-09-16 PROCEDURE — 74019 RADEX ABDOMEN 2 VIEWS: CPT | Mod: TC

## 2020-09-16 PROCEDURE — 96361 HYDRATE IV INFUSION ADD-ON: CPT

## 2020-09-16 RX ORDER — METOPROLOL SUCCINATE 25 MG/1
25 TABLET, EXTENDED RELEASE ORAL DAILY
Status: ON HOLD | COMMUNITY
End: 2024-01-01

## 2020-09-16 RX ORDER — METOPROLOL SUCCINATE 50 MG/1
50 TABLET, EXTENDED RELEASE ORAL ONCE
Status: COMPLETED | OUTPATIENT
Start: 2020-09-16 | End: 2020-09-16

## 2020-09-16 RX ORDER — HYDROCHLOROTHIAZIDE 25 MG/1
25 TABLET ORAL DAILY
COMMUNITY
End: 2023-01-01

## 2020-09-16 RX ORDER — ONDANSETRON 4 MG/1
4 TABLET, ORALLY DISINTEGRATING ORAL EVERY 6 HOURS PRN
Qty: 15 TABLET | Refills: 0 | Status: SHIPPED | OUTPATIENT
Start: 2020-09-16 | End: 2020-09-19

## 2020-09-16 RX ORDER — ASPIRIN 81 MG/1
81 TABLET ORAL DAILY
Status: ON HOLD | COMMUNITY
End: 2024-01-01

## 2020-09-16 RX ORDER — LISINOPRIL 20 MG/1
10 TABLET ORAL DAILY
Status: ON HOLD | COMMUNITY
End: 2024-01-01

## 2020-09-16 RX ORDER — ONDANSETRON 2 MG/ML
4 INJECTION INTRAMUSCULAR; INTRAVENOUS EVERY 30 MIN PRN
Status: DISCONTINUED | OUTPATIENT
Start: 2020-09-16 | End: 2020-09-16 | Stop reason: HOSPADM

## 2020-09-16 RX ORDER — CLONIDINE HYDROCHLORIDE 0.1 MG/1
0.2 TABLET ORAL ONCE
Status: COMPLETED | OUTPATIENT
Start: 2020-09-16 | End: 2020-09-16

## 2020-09-16 RX ADMIN — ONDANSETRON 4 MG: 2 INJECTION INTRAMUSCULAR; INTRAVENOUS at 18:15

## 2020-09-16 RX ADMIN — SODIUM CHLORIDE 1000 ML: 9 INJECTION, SOLUTION INTRAVENOUS at 16:54

## 2020-09-16 RX ADMIN — ONDANSETRON 4 MG: 2 INJECTION INTRAMUSCULAR; INTRAVENOUS at 16:54

## 2020-09-16 RX ADMIN — CLONIDINE HYDROCHLORIDE 0.2 MG: 0.1 TABLET ORAL at 16:54

## 2020-09-16 RX ADMIN — METOPROLOL SUCCINATE 50 MG: 50 TABLET, EXTENDED RELEASE ORAL at 18:15

## 2020-09-16 ASSESSMENT — ENCOUNTER SYMPTOMS
FATIGUE: 0
NECK PAIN: 0
FEVER: 0
DIZZINESS: 0
NAUSEA: 1
CHEST TIGHTNESS: 0
SHORTNESS OF BREATH: 0
DIARRHEA: 0
BLOOD IN STOOL: 0
CHILLS: 0
NECK STIFFNESS: 0
NERVOUS/ANXIOUS: 0
COUGH: 0
PHOTOPHOBIA: 0
WEAKNESS: 1
BRUISES/BLEEDS EASILY: 0
VOMITING: 1
PALPITATIONS: 0
BACK PAIN: 0
ACTIVITY CHANGE: 1
HEADACHES: 0
ABDOMINAL DISTENTION: 0
APPETITE CHANGE: 1
LIGHT-HEADEDNESS: 0
ABDOMINAL PAIN: 0

## 2020-09-16 NOTE — ED AVS SNAPSHOT
[facility]  [address]  [place] [address]  Phone:  [phone]                                    Hali Baez   MRN: [id number]    Department:  [facility]   Date of Visit:  9/16/2020           After Visit Summary Signature Page    I have received my discharge instructions, and my questions have been answered. I have discussed any challenges I see with this plan with the nurse or doctor.    ..........................................................................................................................................  Patient/Patient Representative Signature      ..........................................................................................................................................  Patient Representative Print Name and Relationship to Patient    ..................................................               ................................................  Date                                   Time    ..........................................................................................................................................  Reviewed by Signature/Title    ...................................................              ..............................................  Date                                               Time          22EPIC Rev 08/18

## 2020-09-16 NOTE — ED PROVIDER NOTES
History     Chief Complaint   Patient presents with     Nausea & Vomiting     The history is provided by the patient.     Hali Baez is a 89 year old female who presented to the emergency department ambulatory for evaluation of a 5-day history of nausea and vomiting.  The patient tells me that she is unable to keep any fluid or food down.  Unable to take her medications.  She denies any chest pain, dyspnea, fevers, chills, abdominal pain, distention, diarrhea, hematochezia, melena, hematemesis.  Denies any headaches.  Denies any visual concerns.  Denies any muscle aches or joint pains.  Reports that she is beginning to have bilious emesis.  Normal bowel movement yesterday.  Passing flatus.    Allergies:  Allergies   Allergen Reactions     Codeine      Iodine      Penicillins        Problem List:    There are no active problems to display for this patient.       Past Medical History:    No past medical history on file.    Past Surgical History:    No past surgical history on file.    Family History:    No family history on file.    Social History:  Marital Status:   [2]  Social History     Tobacco Use     Smoking status: Never Smoker   Substance Use Topics     Alcohol use: Not on file     Drug use: Not on file        Medications:    aspirin 81 MG EC tablet  hydrochlorothiazide (HYDRODIURIL) 25 MG tablet  LEVOTHYROXINE SODIUM PO  lisinopril (ZESTRIL) 20 MG tablet  metoprolol succinate ER (TOPROL-XL) 25 MG 24 hr tablet  ondansetron (ZOFRAN ODT) 4 MG ODT tab          Review of Systems   Constitutional: Positive for activity change and appetite change. Negative for chills, fatigue and fever.   HENT: Negative.    Eyes: Negative for photophobia and visual disturbance.   Respiratory: Negative for cough, chest tightness and shortness of breath.    Cardiovascular: Negative for chest pain, palpitations and leg swelling.   Gastrointestinal: Positive for nausea and vomiting. Negative for abdominal  distention, abdominal pain, blood in stool and diarrhea.   Genitourinary: Negative.    Musculoskeletal: Negative for back pain, neck pain and neck stiffness.   Skin: Negative.    Allergic/Immunologic: Negative for immunocompromised state.   Neurological: Positive for weakness. Negative for dizziness, light-headedness and headaches.   Hematological: Does not bruise/bleed easily.   Psychiatric/Behavioral: The patient is not nervous/anxious.        Physical Exam   BP: (!) 203/108  Pulse: 88  Temp: 97.5  F (36.4  C)  Resp: 16  SpO2: 97 %      Physical Exam  Vitals signs and nursing note reviewed.   Constitutional:       General: She is not in acute distress.     Appearance: Normal appearance. She is normal weight. She is not ill-appearing, toxic-appearing or diaphoretic.      Comments: Pleasant and talkative 89-year-old female found semireclined on the exam bed in no distress.   HENT:      Head: Normocephalic and atraumatic.   Eyes:      Extraocular Movements: Extraocular movements intact.      Conjunctiva/sclera: Conjunctivae normal.      Pupils: Pupils are equal, round, and reactive to light.   Neck:      Musculoskeletal: Normal range of motion and neck supple. No neck rigidity or muscular tenderness.   Cardiovascular:      Rate and Rhythm: Normal rate and regular rhythm.      Pulses: Normal pulses.   Pulmonary:      Effort: Pulmonary effort is normal.      Breath sounds: Normal breath sounds.   Abdominal:      General: There is no distension.      Palpations: Abdomen is soft.      Tenderness: There is no abdominal tenderness. There is no guarding.      Comments: Examination of the abdomen reveals a soft abdomen without evidence of tenderness or guarding on light or deep palpation of all 4 quadrants including the periumbilical area.   Musculoskeletal:      Right lower leg: No edema.      Left lower leg: No edema.   Lymphadenopathy:      Cervical: No cervical adenopathy.   Skin:     General: Skin is warm and dry.       Capillary Refill: Capillary refill takes less than 2 seconds.   Neurological:      General: No focal deficit present.      Mental Status: She is alert and oriented to person, place, and time.      Motor: No weakness.      Gait: Gait normal.   Psychiatric:         Mood and Affect: Mood normal.         ED Course        Procedures               Critical Care time:  none               Results for orders placed or performed during the hospital encounter of 09/16/20 (from the past 24 hour(s))   CBC with platelets differential   Result Value Ref Range    WBC 17.7 (H) 4.0 - 11.0 10e9/L    RBC Count 4.71 3.8 - 5.2 10e12/L    Hemoglobin 15.0 11.7 - 15.7 g/dL    Hematocrit 44.3 35.0 - 47.0 %    MCV 94 78 - 100 fl    MCH 31.8 26.5 - 33.0 pg    MCHC 33.9 31.5 - 36.5 g/dL    RDW 13.9 10.0 - 15.0 %    Platelet Count 230 150 - 450 10e9/L    Diff Method Automated Method     % Neutrophils 80.1 %    % Lymphocytes 15.1 %    % Monocytes 3.4 %    % Eosinophils 0.1 %    % Basophils 0.3 %    % Immature Granulocytes 1.0 %    Nucleated RBCs 0 0 /100    Absolute Neutrophil 14.2 (H) 1.6 - 8.3 10e9/L    Absolute Lymphocytes 2.7 0.8 - 5.3 10e9/L    Absolute Monocytes 0.6 0.0 - 1.3 10e9/L    Absolute Eosinophils 0.0 0.0 - 0.7 10e9/L    Absolute Basophils 0.1 0.0 - 0.2 10e9/L    Abs Immature Granulocytes 0.2 0 - 0.4 10e9/L    Absolute Nucleated RBC 0.0    Comprehensive metabolic panel   Result Value Ref Range    Sodium 131 (L) 133 - 144 mmol/L    Potassium 3.5 3.4 - 5.3 mmol/L    Chloride 96 94 - 109 mmol/L    Carbon Dioxide 28 20 - 32 mmol/L    Anion Gap 7 3 - 14 mmol/L    Glucose 107 (H) 70 - 99 mg/dL    Urea Nitrogen 22 7 - 30 mg/dL    Creatinine 1.04 0.52 - 1.04 mg/dL    GFR Estimate 46 (L) >60 mL/min/[1.73_m2]    GFR Estimate If Black 54 (L) >60 mL/min/[1.73_m2]    Calcium 8.9 8.5 - 10.1 mg/dL    Bilirubin Total 0.9 0.2 - 1.3 mg/dL    Albumin 4.0 3.4 - 5.0 g/dL    Protein Total 8.4 6.8 - 8.8 g/dL    Alkaline Phosphatase 87 40 - 150 U/L    ALT  15 0 - 50 U/L    AST 12 0 - 45 U/L   Lipase   Result Value Ref Range    Lipase 65 (L) 73 - 393 U/L   Magnesium   Result Value Ref Range    Magnesium 2.5 (H) 1.6 - 2.3 mg/dL   TSH with free T4 reflex   Result Value Ref Range    TSH 98.54 (H) 0.40 - 4.00 mU/L   Troponin I   Result Value Ref Range    Troponin I ES <0.015 0.000 - 0.045 ug/L   T4 free   Result Value Ref Range    T4 Free 0.65 (L) 0.76 - 1.46 ng/dL   CT Head w/o Contrast    Narrative    PROCEDURE: CT HEAD W/O CONTRAST     HISTORY: persistent nausea and vomiting.    COMPARISON: None.    TECHNIQUE:  Helical images of the head from the foramen magnum to the  vertex were obtained without contrast.    FINDINGS: There is an old lacunar infarct seen in the anterior limb of  the internal capsule on the right. There is white matter low density  consistent with small vessel changes. Old lacunar infarcts are seen in  both hemispheres and cerebellum. There are no masses or ventricular  shifts or extracerebral collections. The ventricular system is normal  in size.  The visualized paranasal sinuses are clear.      Impression    IMPRESSION: No acute brain abnormality.      WATSON CARRANZA MD   XR Abdomen 2 Views    Narrative    PROCEDURE: XR ABDOMEN 2 VW 9/16/2020 5:24 PM    HISTORY: persistent vomiting    COMPARISONS: None.    TECHNIQUE: Flat and upright    FINDINGS: The intestinal gas pattern is normal. There is no  extraluminal gas or pathologic intra-abdominal calcifications. Severe  arthritic changes are noted in the right hip. Surgical clips are seen  in the right upper quadrant.         Impression    IMPRESSION: Normal abdominal gas pattern    WATSON CARRANZA MD   XR Chest 1 View    Narrative    PROCEDURE:  XR CHEST 1 VW    HISTORY:  vomiting and leukocytosis.     COMPARISON:  None.    FINDINGS:   There is mild cardiomegaly. The pulmonary vasculature is normal.  The  lungs are clear. No pleural effusion or pneumothorax. There is a  shoulder prosthesis in place  on the right.      Impression    IMPRESSION:  Cardiomegaly. No active pulmonary infiltrates      WATSON CARRANZA MD   UA reflex to Microscopic   Result Value Ref Range    Color Urine Straw     Appearance Urine Clear     Glucose Urine Negative NEG^Negative mg/dL    Bilirubin Urine Negative NEG^Negative    Ketones Urine Negative NEG^Negative mg/dL    Specific Gravity Urine 1.006 1.003 - 1.035    Blood Urine Negative NEG^Negative    pH Urine 6.5 4.7 - 8.0 pH    Protein Albumin Urine Negative NEG^Negative mg/dL    Urobilinogen mg/dL Normal 0.0 - 2.0 mg/dL    Nitrite Urine Negative NEG^Negative    Leukocyte Esterase Urine Negative NEG^Negative    Source Midstream Urine        Medications   ondansetron (ZOFRAN) injection 4 mg (4 mg Intravenous Given 9/16/20 1815)   0.9% sodium chloride BOLUS (0 mLs Intravenous Stopped 9/16/20 1812)   cloNIDine (CATAPRES) tablet 0.2 mg (0.2 mg Oral Given 9/16/20 1654)   metoprolol succinate ER (TOPROL-XL) 24 hr tablet 50 mg (50 mg Oral Given 9/16/20 1815)       Assessments & Plan (with Medical Decision Making)   Findings as above.  She seems to have chronic leukocytosis that has been rising for the last multiple years.  Previous was 13,000.  I believe today's leukocytosis can be explained by the chronic nausea and vomiting.  She has no evidence of infection.  She has no abdominal pain or chest pain.  She has no headache or visual concerns.  X-ray of the chest and abdomen were unremarkable.  CT scan of the head were negative for evidence hydrocephalus, brain mass, subdural, or epidural hematoma.  She felt significantly improved after simple Zofran and hydration.  She was able to tolerate her medications and oral liquids.  I had a long detailed discussion with the patient regarding protracted observation in the hospital to monitor for intake as well as blood pressure.  She politely but completely and adamantly declined.  This is reasonable.  Her blood pressure returned to relatively normal  after clonidine.  Her 24-hour Toprol was provided here in the emergency department as well.  She is quite pleasant and talkative.  And I believe that she can follow-up with her primary care provider in the next 1 to 2 days for recheck.  Ms. Rush voiced complete understanding of the work-up and diagnosis and was quite happy and agreeable.  She was able to tell me that her primary care provider recently increased her levothyroxine given the abnormal TSH.  No reasonable indication of adjust her medication at this time.  Zofran for home.    This document was prepared using a combination of typing and voice generated software.  While every attempt was made for accuracy, spelling and grammatical errors may exist.      I have reviewed the nursing notes.    I have reviewed the findings, diagnosis, plan and need for follow up with the patient.       Discharge Medication List as of 9/16/2020  6:30 PM      START taking these medications    Details   ondansetron (ZOFRAN ODT) 4 MG ODT tab Take 1 tablet (4 mg) by mouth every 6 hours as needed for nausea, Disp-15 tablet,R-0, E-Prescribe             Final diagnoses:   Benign essential hypertension   Nausea and vomiting   Hypothyroidism       9/16/2020   [facility]     Mel Wade PA-C  09/16/20 [id number]

## 2020-09-16 NOTE — DISCHARGE INSTRUCTIONS
Please call and talk to Dr. Alcantara-Ernesto tomorrow and discuss your emergency room visit.    Your thyroid function is quite low and I am glad that she has recently increased your levothyroxine.    Your CT scan of your head, chest x-ray, and abdominal x-ray showed no emergent findings.    Your laboratory evaluation was otherwise unrevealing for an emergent condition.    Your elevated blood pressure is likely secondary to the inability to keep down your 3 blood pressure medications.    Please return here for any worsening symptoms, new symptoms, or other concerns.    You may start Zofran tonight to keep the nausea to a minimum.

## 2020-09-16 NOTE — ED NOTES
Pt discharged at this time with son. Discharge instructions reviewed with pt and son. New medications sent to pt's preferred pharmacy. Instructed pt to return with any new or worsening symptoms. Verbalized understanding.

## 2020-09-16 NOTE — ED NOTES
"Pt here with son for c/o vomiting and nausea x5 days. Pt states she can't keep her home medications down so she has not been taking them. Pt states \"I feel great otherwise, I just cant stop throwing up.\" Denies any other complaints. Call light within reach. Warm blankets given per request.   "

## 2020-09-16 NOTE — ED NOTES
Pt denies nausea currently, is fearful of taking PO Toprol and vomiting afterward. Premedicated with Zofran prior to administration of Toprol.

## 2021-03-11 ENCOUNTER — IMMUNIZATION (OUTPATIENT)
Dept: FAMILY MEDICINE | Facility: OTHER | Age: 86
End: 2021-03-11
Attending: FAMILY MEDICINE
Payer: MEDICARE

## 2021-03-11 PROCEDURE — 91300 PR COVID VAC PFIZER DIL RECON 30 MCG/0.3 ML IM: CPT

## 2021-03-30 ENCOUNTER — IMMUNIZATION (OUTPATIENT)
Dept: FAMILY MEDICINE | Facility: OTHER | Age: 86
End: 2021-03-30
Attending: FAMILY MEDICINE
Payer: MEDICARE

## 2021-03-30 PROCEDURE — 91300 PR COVID VAC PFIZER DIL RECON 30 MCG/0.3 ML IM: CPT

## 2021-06-10 ENCOUNTER — OFFICE VISIT (OUTPATIENT)
Dept: PODIATRY | Facility: OTHER | Age: 86
End: 2021-06-10
Attending: PODIATRIST
Payer: COMMERCIAL

## 2021-06-10 VITALS
BODY MASS INDEX: 31.82 KG/M2 | HEART RATE: 89 BPM | OXYGEN SATURATION: 95 % | RESPIRATION RATE: 12 BRPM | DIASTOLIC BLOOD PRESSURE: 70 MMHG | HEIGHT: 66 IN | TEMPERATURE: 98.2 F | SYSTOLIC BLOOD PRESSURE: 122 MMHG | WEIGHT: 198 LBS

## 2021-06-10 DIAGNOSIS — L97.522 SKIN ULCER OF TOE OF LEFT FOOT WITH FAT LAYER EXPOSED (H): ICD-10-CM

## 2021-06-10 DIAGNOSIS — L60.2 ONYCHOGRYPHOSIS: Primary | ICD-10-CM

## 2021-06-10 DIAGNOSIS — L60.3 ONYCHODYSTROPHY: ICD-10-CM

## 2021-06-10 PROCEDURE — 99203 OFFICE O/P NEW LOW 30 MIN: CPT | Mod: 25 | Performed by: PODIATRIST

## 2021-06-10 PROCEDURE — G0463 HOSPITAL OUTPT CLINIC VISIT: HCPCS | Mod: 25

## 2021-06-10 PROCEDURE — 11721 DEBRIDE NAIL 6 OR MORE: CPT

## 2021-06-10 PROCEDURE — 11721 DEBRIDE NAIL 6 OR MORE: CPT | Performed by: PODIATRIST

## 2021-06-10 RX ORDER — VITAMIN B COMPLEX
1 TABLET ORAL DAILY
COMMUNITY
End: 2023-01-01

## 2021-06-10 ASSESSMENT — MIFFLIN-ST. JEOR: SCORE: 1319.87

## 2021-06-10 ASSESSMENT — PAIN SCALES - GENERAL: PAINLEVEL: NO PAIN (0)

## 2021-06-10 NOTE — NURSING NOTE
"Chief Complaint   Patient presents with     Toenail     trimming       Initial /70 (BP Location: Left arm, Patient Position: Sitting, Cuff Size: Adult Regular)   Pulse 89   Temp 98.2  F (36.8  C) (Tympanic)   Resp 12   Ht 1.676 m (5' 6\")   Wt 89.8 kg (198 lb)   SpO2 95%   BMI 31.96 kg/m   Estimated body mass index is 31.96 kg/m  as calculated from the following:    Height as of this encounter: 1.676 m (5' 6\").    Weight as of this encounter: 89.8 kg (198 lb).  Medication Reconciliation: complete  Shannan Foster LPN  "

## 2021-06-10 NOTE — PROGRESS NOTES
"Chief complaint: Patient presents with:  Toenail: trimming      History of Present Illness: This 89 year old female is seen at the request of No ref. provider found for evaluation and suggestions of management of elongated toenails. The patient says she has been unable to trim them herself for a long time. The toenail on the LEFT 4th digit sometimes digs into her other toe and causes her pain, but the toenails otherwise do not have pain. She has also had increased LEFT dorsal 2nd digit pain recently.    Patient also just recently moved from her home to an assisted living facility in [place]. She says she moved there two weeks after her  moved there because she thought she could no longer take care of herself. She says she is enjoying the facility and she has assistance fi she needs dressing changes.    No further pedal complaints today.     Patient does not use tobacco products.     /70 (BP Location: Left arm, Patient Position: Sitting, Cuff Size: Adult Regular)   Pulse 89   Temp 98.2  F (36.8  C) (Tympanic)   Resp 12   Ht 1.676 m (5' 6\")   Wt 89.8 kg (198 lb)   SpO2 95%   BMI 31.96 kg/m      There is no problem list on file for this patient.      History reviewed. No pertinent surgical history.    Current Outpatient Medications   Medication     aspirin 81 MG EC tablet     hydrochlorothiazide (HYDRODIURIL) 25 MG tablet     LEVOTHYROXINE SODIUM PO     lisinopril (ZESTRIL) 20 MG tablet     metoprolol succinate ER (TOPROL-XL) 25 MG 24 hr tablet     Vitamin D3 (CHOLECALCIFEROL) 25 mcg (1000 units) tablet     No current facility-administered medications for this visit.           Allergies   Allergen Reactions     Codeine      Iodine      Penicillins        History reviewed. No pertinent family history.    Social History     Socioeconomic History     Marital status:      Spouse name: None     Number of children: None     Years of education: None     Highest education level: None   Occupational " History     None   Social Needs     Financial resource strain: None     Food insecurity     Worry: None     Inability: None     Transportation needs     Medical: None     Non-medical: None   Tobacco Use     Smoking status: Never Smoker     Smokeless tobacco: Never Used   Substance and Sexual Activity     Alcohol use: None     Drug use: None     Sexual activity: None   Lifestyle     Physical activity     Days per week: None     Minutes per session: None     Stress: None   Relationships     Social connections     Talks on phone: None     Gets together: None     Attends [employer] service: None     Active member of club or organization: None     Attends meetings of clubs or organizations: None     Relationship status: None     Intimate partner violence     Fear of current or ex partner: None     Emotionally abused: None     Physically abused: None     Forced sexual activity: None   Other Topics Concern     Parent/sibling w/ CABG, MI or angioplasty before 65F 55M? Not Asked   Social History Narrative    ** Merged History Encounter **         Preload  12/27/2012       ROS: 10 point ROS neg other than the symptoms noted above in the HPI.  EXAM  Constitutional: healthy, alert and no distress    Psychiatric: mentation appears normal and affect normal/bright    VASCULAR:  -Dorsalis pedis pulse +1/4 b/l  -Posterior tibial pulse +1/4 b/l  -Capillary refill time < 3 seconds to b/l hallux  -Hair growth Absent to b/l anterior legs and ankles  NEURO:  -Light touch sensation intact to b/l plantar forefoot  DERM:  -Skin temperature within normal limits to bilateral foot  -Skin thin, atrophic to bilateral foot  -Toenails significantly elongated, thickened, dystrophic, discolored, curling in circles at the ends of the toes and several digital nails have grown over the toe and into the skin (dystrophy to all 10 toenails)  Wound Location:  LEFT dorsal second digit DIPJ  06/13/2021  Measurement:  0.4cm x 0.5cm x 0.1cm to subcutaneous  tissue  Drainage:  Mild serous  Odor:  None  Undermining:  None  Edges:  Intact  Base:  100% viable  Surrounding Skin: Intact  No severe erythema, no ascending erythema, no calor, no purulence, no malodor, no other SOI.   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 Treatment options discussed with no educational barriers noted.  -Nails debrided x 10 without incident  ---ABN signed    -Ulcer base of the LEFT dorsal second digit contained minimal non-viable tissue and was not in need of debridement today.  ---Wound was not located near the vicinity of the landon-wound toenails. The wound appears to be from rubbing on her shoe gear. Discussed how shoe gear can rub on toes and create blisters and wounds. She should carefully watch this for the next couple of weeks including monitoring her shoe gear to make sure she is not wearing shoe gear that creat pressure on her toes. She expressed understanding of these instructions.   ---Applied Mepilex Ag dressing -- add'l supplies dispensed to patient.  ---Patient is to change the dressing every two days. Written instructions were provided for her care facility to help her with the dressing changes.  ----Patient was instructed to look for SOI (redness, swelling, pain, purulence, fever, chills, nausea, vomiting) and to return to podiatry or the emergency department immediately if there are any SOI.     -Foot Education provided. This included checking the feet daily looking for new new blisters or wounds, wearing shoes at all times when walking including around the house, and avoiding lotion application between the toes. If there are any signs of infection,  the patient should present to the ED as soon as possible. Infections of the foot can be life threatening or lead to amputations of the foot or leg.    -Patient in agreement with the above treatment plan and all of patient's questions were answered.      RTC two weeks to evaluate LEFT dorsal 2nd digit ulcer  ---If healed, patient would like to follow-up as needed for her toenails        Cassy Becker DPM

## 2021-06-10 NOTE — PATIENT INSTRUCTIONS
Dressing changes every other day to the right 2nd toe. Apply mepilex and a bandage or tape. Lotion feet and legs once a day but not between the toes. Watch for any signs of infections. If any infection occurs go to urgent care or call us with any concerns.   Thank you for allowing  and our Podiatry team to participate in your care. Please call our office at [phone] with scheduling questions or with any other questions or concerns.

## 2022-07-23 ENCOUNTER — HOSPITAL ENCOUNTER (EMERGENCY)
Facility: OTHER | Age: 87
Discharge: HOME | End: 2022-07-23
Attending: EMERGENCY MEDICINE | Admitting: EMERGENCY MEDICINE
Payer: MEDICARE

## 2022-07-23 VITALS
BODY MASS INDEX: 28.46 KG/M2 | SYSTOLIC BLOOD PRESSURE: 127 MMHG | TEMPERATURE: 100.6 F | WEIGHT: 177.1 LBS | HEART RATE: 105 BPM | OXYGEN SATURATION: 96 % | HEIGHT: 66 IN | DIASTOLIC BLOOD PRESSURE: 71 MMHG | RESPIRATION RATE: 18 BRPM

## 2022-07-23 DIAGNOSIS — N39.0 URINARY TRACT INFECTION WITHOUT HEMATURIA, SITE UNSPECIFIED: ICD-10-CM

## 2022-07-23 DIAGNOSIS — R50.9 FEVER, UNSPECIFIED FEVER CAUSE: ICD-10-CM

## 2022-07-23 LAB
ALBUMIN UR-MCNC: 10 MG/DL
ANION GAP SERPL CALCULATED.3IONS-SCNC: 11 MMOL/L (ref 3–14)
ANION GAP SERPL CALCULATED.3IONS-SCNC: 13 MMOL/L (ref 3–14)
APPEARANCE UR: CLEAR
BACTERIA #/AREA URNS HPF: ABNORMAL /HPF
BASOPHILS # BLD AUTO: 0 10E3/UL (ref 0–0.2)
BASOPHILS NFR BLD AUTO: 0 %
BILIRUB UR QL STRIP: NEGATIVE
BUN SERPL-MCNC: 41 MG/DL (ref 7–25)
BUN SERPL-MCNC: 43 MG/DL (ref 7–25)
CALCIUM SERPL-MCNC: 9 MG/DL (ref 8.6–10.3)
CALCIUM SERPL-MCNC: 9.5 MG/DL (ref 8.6–10.3)
CHLORIDE BLD-SCNC: 102 MMOL/L (ref 98–107)
CHLORIDE BLD-SCNC: 97 MMOL/L (ref 98–107)
CO2 SERPL-SCNC: 23 MMOL/L (ref 21–31)
CO2 SERPL-SCNC: 25 MMOL/L (ref 21–31)
COLOR UR AUTO: YELLOW
CREAT SERPL-MCNC: 1.34 MG/DL (ref 0.6–1.2)
CREAT SERPL-MCNC: 1.47 MG/DL (ref 0.6–1.2)
EOSINOPHIL # BLD AUTO: 0 10E3/UL (ref 0–0.7)
EOSINOPHIL NFR BLD AUTO: 0 %
ERYTHROCYTE [DISTWIDTH] IN BLOOD BY AUTOMATED COUNT: 13.9 % (ref 10–15)
FLUAV RNA SPEC QL NAA+PROBE: NEGATIVE
FLUBV RNA RESP QL NAA+PROBE: NEGATIVE
GFR SERPL CREATININE-BSD FRML MDRD: 33 ML/MIN/1.73M2
GFR SERPL CREATININE-BSD FRML MDRD: 36 ML/MIN/1.73M2
GLUCOSE BLD-MCNC: 164 MG/DL (ref 70–105)
GLUCOSE BLD-MCNC: 178 MG/DL (ref 70–105)
GLUCOSE UR STRIP-MCNC: NEGATIVE MG/DL
HCT VFR BLD AUTO: 36.1 % (ref 35–47)
HGB BLD-MCNC: 12.2 G/DL (ref 11.7–15.7)
HGB UR QL STRIP: NEGATIVE
IMM GRANULOCYTES # BLD: 0.1 10E3/UL
IMM GRANULOCYTES NFR BLD: 1 %
KETONES UR STRIP-MCNC: 10 MG/DL
LACTATE SERPL-SCNC: 1.9 MMOL/L (ref 0.7–2)
LACTATE SERPL-SCNC: 2.2 MMOL/L (ref 0.7–2)
LEUKOCYTE ESTERASE UR QL STRIP: ABNORMAL
LYMPHOCYTES # BLD AUTO: 0.5 10E3/UL (ref 0.8–5.3)
LYMPHOCYTES NFR BLD AUTO: 2 %
MCH RBC QN AUTO: 31.8 PG (ref 26.5–33)
MCHC RBC AUTO-ENTMCNC: 33.8 G/DL (ref 31.5–36.5)
MCV RBC AUTO: 94 FL (ref 78–100)
MONOCYTES # BLD AUTO: 0.6 10E3/UL (ref 0–1.3)
MONOCYTES NFR BLD AUTO: 2 %
MUCOUS THREADS #/AREA URNS LPF: PRESENT /LPF
NEUTROPHILS # BLD AUTO: 24 10E3/UL (ref 1.6–8.3)
NEUTROPHILS NFR BLD AUTO: 95 %
NITRATE UR QL: NEGATIVE
NRBC # BLD AUTO: 0 10E3/UL
NRBC BLD AUTO-RTO: 0 /100
PH UR STRIP: 7 [PH] (ref 5–9)
PLATELET # BLD AUTO: 138 10E3/UL (ref 150–450)
POTASSIUM BLD-SCNC: 3.9 MMOL/L (ref 3.5–5.1)
POTASSIUM BLD-SCNC: 4 MMOL/L (ref 3.5–5.1)
RBC # BLD AUTO: 3.84 10E6/UL (ref 3.8–5.2)
RBC URINE: 2 /HPF
RSV RNA SPEC NAA+PROBE: NEGATIVE
SARS-COV-2 RNA RESP QL NAA+PROBE: NEGATIVE
SODIUM SERPL-SCNC: 135 MMOL/L (ref 134–144)
SODIUM SERPL-SCNC: 136 MMOL/L (ref 134–144)
SP GR UR STRIP: 1.01 (ref 1–1.03)
SQUAMOUS EPITHELIAL: 1 /HPF
TROPONIN I SERPL-MCNC: 14.4 PG/ML (ref 0–34)
UROBILINOGEN UR STRIP-MCNC: 2 MG/DL
WBC # BLD AUTO: 25.3 10E3/UL (ref 4–11)
WBC URINE: 82 /HPF

## 2022-07-23 PROCEDURE — 84484 ASSAY OF TROPONIN QUANT: CPT | Performed by: EMERGENCY MEDICINE

## 2022-07-23 PROCEDURE — 250N000011 HC RX IP 250 OP 636: Performed by: EMERGENCY MEDICINE

## 2022-07-23 PROCEDURE — 99284 EMERGENCY DEPT VISIT MOD MDM: CPT | Mod: CS | Performed by: EMERGENCY MEDICINE

## 2022-07-23 PROCEDURE — 80048 BASIC METABOLIC PNL TOTAL CA: CPT | Performed by: EMERGENCY MEDICINE

## 2022-07-23 PROCEDURE — 258N000003 HC RX IP 258 OP 636: Performed by: EMERGENCY MEDICINE

## 2022-07-23 PROCEDURE — 87086 URINE CULTURE/COLONY COUNT: CPT | Performed by: EMERGENCY MEDICINE

## 2022-07-23 PROCEDURE — 93010 ELECTROCARDIOGRAM REPORT: CPT | Performed by: INTERNAL MEDICINE

## 2022-07-23 PROCEDURE — 87637 SARSCOV2&INF A&B&RSV AMP PRB: CPT | Performed by: EMERGENCY MEDICINE

## 2022-07-23 PROCEDURE — 36415 COLL VENOUS BLD VENIPUNCTURE: CPT | Performed by: EMERGENCY MEDICINE

## 2022-07-23 PROCEDURE — 36416 COLLJ CAPILLARY BLOOD SPEC: CPT | Performed by: EMERGENCY MEDICINE

## 2022-07-23 PROCEDURE — 81001 URINALYSIS AUTO W/SCOPE: CPT | Performed by: EMERGENCY MEDICINE

## 2022-07-23 PROCEDURE — 93005 ELECTROCARDIOGRAM TRACING: CPT | Performed by: EMERGENCY MEDICINE

## 2022-07-23 PROCEDURE — C9803 HOPD COVID-19 SPEC COLLECT: HCPCS | Performed by: EMERGENCY MEDICINE

## 2022-07-23 PROCEDURE — 85025 COMPLETE CBC W/AUTO DIFF WBC: CPT | Performed by: EMERGENCY MEDICINE

## 2022-07-23 PROCEDURE — 83605 ASSAY OF LACTIC ACID: CPT | Performed by: EMERGENCY MEDICINE

## 2022-07-23 PROCEDURE — 99283 EMERGENCY DEPT VISIT LOW MDM: CPT | Mod: CS | Performed by: EMERGENCY MEDICINE

## 2022-07-23 RX ORDER — SODIUM CHLORIDE 9 MG/ML
INJECTION, SOLUTION INTRAVENOUS CONTINUOUS
Status: DISCONTINUED | OUTPATIENT
Start: 2022-07-23 | End: 2022-07-23 | Stop reason: HOSPADM

## 2022-07-23 RX ORDER — CEFTRIAXONE SODIUM 1 G/50ML
1 INJECTION, SOLUTION INTRAVENOUS ONCE
Status: COMPLETED | OUTPATIENT
Start: 2022-07-23 | End: 2022-07-23

## 2022-07-23 RX ORDER — SULFAMETHOXAZOLE/TRIMETHOPRIM 800-160 MG
1 TABLET ORAL 2 TIMES DAILY
Qty: 6 TABLET | Refills: 0 | Status: SHIPPED | OUTPATIENT
Start: 2022-07-23 | End: 2022-07-26

## 2022-07-23 RX ADMIN — CEFTRIAXONE SODIUM 1 G: 1 INJECTION, SOLUTION INTRAVENOUS at 01:19

## 2022-07-23 RX ADMIN — SODIUM CHLORIDE: 9 INJECTION, SOLUTION INTRAVENOUS at 02:38

## 2022-07-23 RX ADMIN — SODIUM CHLORIDE 1000 ML: 9 INJECTION, SOLUTION INTRAVENOUS at 01:15

## 2022-07-23 NOTE — ED PROVIDER NOTES
Emergency Department Provider Note  : 7/3/1927 Age: 89 year old Sex: female MRN: [id number]    Chief Complaint   Patient presents with     Nausea & Vomiting       Medical Decision Making / Assessment / Plan   89 year old female presenting with poor p.o. intake and vomiting.  We will plan to check her electrolytes and some basic labs, an EKG and troponin, COVID, and give some IV fluids.  We will also check a UA to see if she does really have evidence of infection.  It seems that the antibiotics are potentially contributing to her symptoms so if they could be stopped, that would probably help.  Of note, she was found to have a fever here so we will give her some ceftriaxone while we are sorting things out.    ED Course as of 22 0353   Sat 2022   0307 Labs returned notable for a slightly elevated lactate, slightly elevated creatinine and WBCs of 25K.  COVID was negative.  Patient was feeling better after little bit of IV fluid.   0325 Urinalysis returned consistent with infection, lactate and creatinine improved with IV fluid.    0353 Prescription for Bactrim sent to the pharmacy for the patient.         Final diagnoses:   Fever, unspecified fever cause   Urinary tract infection without hematuria, site unspecified       Basilio Heck MD  2022   Emergency Department    Subjective   Skye is a 89 year old female who presents at 12:53 AM with vomiting since earlier this evening.  Patient reports that 6 days ago she went to the doctor for her yearly checkup and had no symptoms.  Apparently a urinalysis was done and it was thought that she had a UTI so she was started on antibiotics 4 days ago.  Since that time she has not been able to eat much of anything.  She vomited a couple times each of the past few days and then a lot tonight which prompted the visit to the ER.      Upon her arrival when I first spoke with her, she reported that her dizziness and nausea was actually better.  She stated that her  "mouth was very dry.  She denied fever, chest pain, abdominal pain, shortness of breath, cough, headache, cuts or soft tissue infections, or any pain. She states that she has had a UTI in the past and had symptoms with that.  She denies any symptoms of UTI currently.     I have reviewed the Medications, Allergies, Past Medical and Surgical History, and Social History in the Epic System and with family.    Review of Systems:  See HPI for pertinent positives and negatives. All other systems reviewed and found to be negative.      Objective   BP: 127/71  Pulse: 105  Temp: (!) 100.6  F (38.1  C)  Resp: 18  Height: 167.6 cm (5' 6\")  Weight: 80.3 kg (177 lb 1.6 oz)  SpO2: 96 %    Physical Exam:   General: awake and alert, pleasant  Head: normocephalic, atraumatic  Eyes:conjugate gaze  ENT: moist membranes  Chest/Respiratory: normal resp effort  Cardiovascular: warm and well perfused  Abdominal: soft, non-distended, non-tender  Extremities: mobility at baseline  Neurological: moving all extremities, normal speech  Skin: warm and dry  Psychiatric: appropriate affect        Medical/Surgical History:  No past medical history on file.  No past surgical history on file.    Medications:  Current Facility-Administered Medications   Medication     sodium chloride 0.9% infusion     Current Outpatient Medications   Medication     aspirin 81 MG EC tablet     hydrochlorothiazide (HYDRODIURIL) 25 MG tablet     LEVOTHYROXINE SODIUM PO     lisinopril (ZESTRIL) 20 MG tablet     metoprolol succinate ER (TOPROL-XL) 25 MG 24 hr tablet     sulfamethoxazole-trimethoprim (BACTRIM DS) 800-160 MG tablet     Vitamin D3 (CHOLECALCIFEROL) 25 mcg (1000 units) tablet       Allergies:  Codeine, Iodine, and Penicillins    Relevant labs, images, EKGs, Epic and outside hospital (if applicable) charts were reviewed. The findings, diagnosis, plan, and need for follow up were discussed with the patient/family. Nursing notes were reviewed.        Basilio Heck " MD [facility]  07/23/22 035

## 2022-07-23 NOTE — ED TRIAGE NOTES
Pt brought to ED by EMS for vomiting.  Per EMS and staff at nursing home, pt has been vomiting since 1600 today.  Was getting up to bathroom got dizzy and fell back into wheelchair.       Triage Assessment     Row Name 07/23/22 0055       Triage Assessment (Adult)    Airway WDL WDL       Respiratory WDL    Respiratory WDL WDL       Skin Circulation/Temperature WDL    Skin Circulation/Temperature WDL WDL       Cardiac WDL    Cardiac WDL X;rhythm    Cardiac Rhythm tachycardic       Peripheral/Neurovascular WDL    Peripheral Neurovascular WDL WDL       Cognitive/Neuro/Behavioral WDL    Cognitive/Neuro/Behavioral WDL WDL

## 2022-07-24 LAB — BACTERIA UR CULT: NORMAL

## 2022-07-26 LAB
ATRIAL RATE - MUSE: 102 BPM
DIASTOLIC BLOOD PRESSURE - MUSE: NORMAL MMHG
INTERPRETATION ECG - MUSE: NORMAL
P AXIS - MUSE: NORMAL DEGREES
PR INTERVAL - MUSE: NORMAL MS
QRS DURATION - MUSE: 84 MS
QT - MUSE: 344 MS
QTC - MUSE: 452 MS
R AXIS - MUSE: 35 DEGREES
SYSTOLIC BLOOD PRESSURE - MUSE: NORMAL MMHG
T AXIS - MUSE: 98 DEGREES
VENTRICULAR RATE- MUSE: 104 BPM

## 2022-07-27 ENCOUNTER — DOCUMENTATION ONLY (OUTPATIENT)
Dept: OTHER | Facility: CLINIC | Age: 87
End: 2022-07-27

## 2022-07-29 ENCOUNTER — DOCUMENTATION ONLY (OUTPATIENT)
Dept: OTHER | Facility: CLINIC | Age: 87
End: 2022-07-29

## 2022-07-30 ENCOUNTER — ANESTHESIA (OUTPATIENT)
Dept: EMERGENCY MEDICINE | Facility: OTHER | Age: 87
End: 2022-07-30
Payer: MEDICARE

## 2022-07-30 ENCOUNTER — APPOINTMENT (OUTPATIENT)
Dept: CT IMAGING | Facility: OTHER | Age: 87
End: 2022-07-30
Attending: FAMILY MEDICINE
Payer: MEDICARE

## 2022-07-30 ENCOUNTER — HOSPITAL ENCOUNTER (EMERGENCY)
Facility: OTHER | Age: 87
Discharge: HOME | End: 2022-07-30
Attending: FAMILY MEDICINE | Admitting: FAMILY MEDICINE
Payer: MEDICARE

## 2022-07-30 ENCOUNTER — APPOINTMENT (OUTPATIENT)
Dept: GENERAL RADIOLOGY | Facility: OTHER | Age: 87
End: 2022-07-30
Attending: FAMILY MEDICINE
Payer: MEDICARE

## 2022-07-30 ENCOUNTER — ANESTHESIA EVENT (OUTPATIENT)
Dept: EMERGENCY MEDICINE | Facility: OTHER | Age: 87
End: 2022-07-30
Payer: MEDICARE

## 2022-07-30 VITALS
SYSTOLIC BLOOD PRESSURE: 136 MMHG | BODY MASS INDEX: 28.45 KG/M2 | TEMPERATURE: 98.4 F | HEART RATE: 80 BPM | DIASTOLIC BLOOD PRESSURE: 72 MMHG | HEIGHT: 66 IN | WEIGHT: 177 LBS | OXYGEN SATURATION: 98 %

## 2022-07-30 DIAGNOSIS — R11.2 INTRACTABLE VOMITING WITH NAUSEA, UNSPECIFIED VOMITING TYPE: ICD-10-CM

## 2022-07-30 DIAGNOSIS — R10.84 GENERALIZED ABDOMINAL PAIN: ICD-10-CM

## 2022-07-30 DIAGNOSIS — D72.829 LEUKOCYTOSIS, UNSPECIFIED TYPE: ICD-10-CM

## 2022-07-30 LAB
ALBUMIN SERPL-MCNC: 3.7 G/DL (ref 3.5–5.7)
ALBUMIN UR-MCNC: NEGATIVE MG/DL
ALP SERPL-CCNC: 46 U/L (ref 34–104)
ALT SERPL W P-5'-P-CCNC: 8 U/L (ref 7–52)
ANION GAP SERPL CALCULATED.3IONS-SCNC: 9 MMOL/L (ref 3–14)
APPEARANCE CSF: CLEAR
APPEARANCE UR: CLEAR
AST SERPL W P-5'-P-CCNC: 10 U/L (ref 13–39)
BACTERIA #/AREA URNS HPF: ABNORMAL /HPF
BASOPHILS # BLD AUTO: 0.1 10E3/UL (ref 0–0.2)
BASOPHILS NFR BLD AUTO: 0 %
BILIRUB SERPL-MCNC: 1 MG/DL (ref 0.3–1)
BILIRUB UR QL STRIP: NEGATIVE
BUN SERPL-MCNC: 32 MG/DL (ref 7–25)
CALCIUM SERPL-MCNC: 9.6 MG/DL (ref 8.6–10.3)
CHLORIDE BLD-SCNC: 96 MMOL/L (ref 98–107)
CO2 SERPL-SCNC: 29 MMOL/L (ref 21–31)
COLOR CSF: COLORLESS
COLOR UR AUTO: YELLOW
CREAT SERPL-MCNC: 1.77 MG/DL (ref 0.6–1.2)
EOSINOPHIL # BLD AUTO: 0 10E3/UL (ref 0–0.7)
EOSINOPHIL NFR BLD AUTO: 0 %
ERYTHROCYTE [DISTWIDTH] IN BLOOD BY AUTOMATED COUNT: 14.3 % (ref 10–15)
ERYTHROCYTE [DISTWIDTH] IN BLOOD BY AUTOMATED COUNT: 14.4 % (ref 10–15)
ERYTHROCYTE [DISTWIDTH] IN BLOOD BY AUTOMATED COUNT: 14.5 % (ref 10–15)
GFR SERPL CREATININE-BSD FRML MDRD: 26 ML/MIN/1.73M2
GLUCOSE BLD-MCNC: 145 MG/DL (ref 70–105)
GLUCOSE CSF-MCNC: 77 MG/DL (ref 40–80)
GLUCOSE UR STRIP-MCNC: NEGATIVE MG/DL
HCT VFR BLD AUTO: 34 % (ref 35–47)
HCT VFR BLD AUTO: 34.4 % (ref 35–47)
HCT VFR BLD AUTO: 36.5 % (ref 35–47)
HGB BLD-MCNC: 11.5 G/DL (ref 11.7–15.7)
HGB BLD-MCNC: 11.6 G/DL (ref 11.7–15.7)
HGB BLD-MCNC: 12.2 G/DL (ref 11.7–15.7)
HGB UR QL STRIP: NEGATIVE
HYALINE CASTS: 3 /LPF
IMM GRANULOCYTES # BLD: 0.4 10E3/UL
IMM GRANULOCYTES NFR BLD: 1 %
IMM GRANULOCYTES NFR BLD: 2 %
IMM GRANULOCYTES NFR BLD: 2 %
KETONES UR STRIP-MCNC: NEGATIVE MG/DL
LACTATE SERPL-SCNC: 1.3 MMOL/L (ref 0.7–2)
LDH SERPL L TO P-CCNC: 148 U/L (ref 140–271)
LEUKOCYTE ESTERASE UR QL STRIP: NEGATIVE
LIPASE SERPL-CCNC: 5 U/L (ref 11–82)
LYMPHOCYTES # BLD AUTO: 1.6 10E3/UL (ref 0.8–5.3)
LYMPHOCYTES # BLD AUTO: 1.8 10E3/UL (ref 0.8–5.3)
LYMPHOCYTES # BLD AUTO: 2 10E3/UL (ref 0.8–5.3)
LYMPHOCYTES NFR BLD AUTO: 6 %
LYMPHOCYTES NFR BLD AUTO: 6 %
LYMPHOCYTES NFR BLD AUTO: 7 %
MCH RBC QN AUTO: 31.3 PG (ref 26.5–33)
MCH RBC QN AUTO: 31.4 PG (ref 26.5–33)
MCH RBC QN AUTO: 31.9 PG (ref 26.5–33)
MCHC RBC AUTO-ENTMCNC: 33.4 G/DL (ref 31.5–36.5)
MCHC RBC AUTO-ENTMCNC: 33.7 G/DL (ref 31.5–36.5)
MCHC RBC AUTO-ENTMCNC: 33.8 G/DL (ref 31.5–36.5)
MCV RBC AUTO: 93 FL (ref 78–100)
MCV RBC AUTO: 94 FL (ref 78–100)
MCV RBC AUTO: 95 FL (ref 78–100)
MONOCYTES # BLD AUTO: 1.5 10E3/UL (ref 0–1.3)
MONOCYTES # BLD AUTO: 1.6 10E3/UL (ref 0–1.3)
MONOCYTES # BLD AUTO: 1.6 10E3/UL (ref 0–1.3)
MONOCYTES NFR BLD AUTO: 6 %
MUCOUS THREADS #/AREA URNS LPF: PRESENT /LPF
NEUTROPHILS # BLD AUTO: 22.5 10E3/UL (ref 1.6–8.3)
NEUTROPHILS # BLD AUTO: 24.1 10E3/UL (ref 1.6–8.3)
NEUTROPHILS # BLD AUTO: 24.2 10E3/UL (ref 1.6–8.3)
NEUTROPHILS NFR BLD AUTO: 86 %
NITRATE UR QL: NEGATIVE
NRBC # BLD AUTO: 0 10E3/UL
NRBC BLD AUTO-RTO: 0 /100
PH UR STRIP: 6.5 [PH] (ref 5–9)
PLATELET # BLD AUTO: 163 10E3/UL (ref 150–450)
PLATELET # BLD AUTO: 165 10E3/UL (ref 150–450)
PLATELET # BLD AUTO: 171 10E3/UL (ref 150–450)
POTASSIUM BLD-SCNC: 4.2 MMOL/L (ref 3.5–5.1)
PROT CSF-MCNC: 48 MG/DL (ref 15–45)
PROT SERPL-MCNC: 7.2 G/DL (ref 6.4–8.9)
RBC # BLD AUTO: 3.64 10E6/UL (ref 3.8–5.2)
RBC # BLD AUTO: 3.66 10E6/UL (ref 3.8–5.2)
RBC # BLD AUTO: 3.9 10E6/UL (ref 3.8–5.2)
RBC # CSF MANUAL: 1 /UL (ref 0–2)
RBC URINE: 1 /HPF
RETICS # AUTO: 0.08 10E6/UL (ref 0.03–0.1)
RETICS/RBC NFR AUTO: 2.2 % (ref 0.5–2)
SODIUM SERPL-SCNC: 134 MMOL/L (ref 134–144)
SP GR UR STRIP: 1.01 (ref 1–1.03)
SQUAMOUS EPITHELIAL: 3 /HPF
TUBE # CSF: 4
UROBILINOGEN UR STRIP-MCNC: NORMAL MG/DL
WBC # BLD AUTO: 26.1 10E3/UL (ref 4–11)
WBC # BLD AUTO: 28 10E3/UL (ref 4–11)
WBC # BLD AUTO: 28.1 10E3/UL (ref 4–11)
WBC # CSF MANUAL: 2 /UL (ref 0–5)
WBC URINE: 2 /HPF

## 2022-07-30 PROCEDURE — 80053 COMPREHEN METABOLIC PANEL: CPT | Performed by: FAMILY MEDICINE

## 2022-07-30 PROCEDURE — 36415 COLL VENOUS BLD VENIPUNCTURE: CPT | Performed by: EMERGENCY MEDICINE

## 2022-07-30 PROCEDURE — 86617 LYME DISEASE ANTIBODY: CPT | Performed by: FAMILY MEDICINE

## 2022-07-30 PROCEDURE — 71045 X-RAY EXAM CHEST 1 VIEW: CPT

## 2022-07-30 PROCEDURE — 85025 COMPLETE CBC W/AUTO DIFF WBC: CPT | Performed by: EMERGENCY MEDICINE

## 2022-07-30 PROCEDURE — 83605 ASSAY OF LACTIC ACID: CPT | Performed by: FAMILY MEDICINE

## 2022-07-30 PROCEDURE — 81001 URINALYSIS AUTO W/SCOPE: CPT | Performed by: FAMILY MEDICINE

## 2022-07-30 PROCEDURE — G1010 CDSM STANSON: HCPCS

## 2022-07-30 PROCEDURE — 85045 AUTOMATED RETICULOCYTE COUNT: CPT | Performed by: EMERGENCY MEDICINE

## 2022-07-30 PROCEDURE — 99285 EMERGENCY DEPT VISIT HI MDM: CPT | Mod: 25 | Performed by: FAMILY MEDICINE

## 2022-07-30 PROCEDURE — 83690 ASSAY OF LIPASE: CPT | Performed by: FAMILY MEDICINE

## 2022-07-30 PROCEDURE — 85025 COMPLETE CBC W/AUTO DIFF WBC: CPT | Performed by: FAMILY MEDICINE

## 2022-07-30 PROCEDURE — 36415 COLL VENOUS BLD VENIPUNCTURE: CPT | Performed by: FAMILY MEDICINE

## 2022-07-30 PROCEDURE — 96360 HYDRATION IV INFUSION INIT: CPT | Mod: XU | Performed by: FAMILY MEDICINE

## 2022-07-30 PROCEDURE — 87070 CULTURE OTHR SPECIMN AEROBIC: CPT | Performed by: FAMILY MEDICINE

## 2022-07-30 PROCEDURE — 89050 BODY FLUID CELL COUNT: CPT | Performed by: FAMILY MEDICINE

## 2022-07-30 PROCEDURE — 99284 EMERGENCY DEPT VISIT MOD MDM: CPT | Performed by: FAMILY MEDICINE

## 2022-07-30 PROCEDURE — 87205 SMEAR GRAM STAIN: CPT | Performed by: FAMILY MEDICINE

## 2022-07-30 PROCEDURE — 62270 DX LMBR SPI PNXR: CPT | Performed by: FAMILY MEDICINE

## 2022-07-30 PROCEDURE — 62270 DX LMBR SPI PNXR: CPT | Performed by: NURSE ANESTHETIST, CERTIFIED REGISTERED

## 2022-07-30 PROCEDURE — 83615 LACTATE (LD) (LDH) ENZYME: CPT | Performed by: EMERGENCY MEDICINE

## 2022-07-30 PROCEDURE — 82945 GLUCOSE OTHER FLUID: CPT | Performed by: FAMILY MEDICINE

## 2022-07-30 PROCEDURE — 258N000003 HC RX IP 258 OP 636: Performed by: FAMILY MEDICINE

## 2022-07-30 PROCEDURE — 84157 ASSAY OF PROTEIN OTHER: CPT | Performed by: FAMILY MEDICINE

## 2022-07-30 RX ORDER — SULFAMETHOXAZOLE/TRIMETHOPRIM 800-160 MG
1 TABLET ORAL 2 TIMES DAILY
COMMUNITY
End: 2023-01-01

## 2022-07-30 RX ORDER — FUROSEMIDE 20 MG
20 TABLET ORAL DAILY
Status: ON HOLD | COMMUNITY
End: 2024-01-01

## 2022-07-30 RX ADMIN — SODIUM CHLORIDE 1000 ML: 900 INJECTION, SOLUTION INTRAVENOUS at 14:59

## 2022-07-30 ASSESSMENT — ENCOUNTER SYMPTOMS
FEVER: 0
VOMITING: 1
SHORTNESS OF BREATH: 0
ABDOMINAL PAIN: 1
COUGH: 0
CHILLS: 1
NAUSEA: 1

## 2022-07-30 NOTE — ED PROVIDER NOTES
History     Chief Complaint   Patient presents with     Abdominal Pain     Nausea, Vomiting, & Diarrhea     The history is provided by the patient and medical records.     Skye Baez is a 89 year old female here with right sided upper abdominal pain, worse with deep breathing. She had onset of N/V last night and today. Her pain is 2 or 3 of 10 when lying still and gets up to 10 of 10 with deep breathing. No fever but she does have chills. No cough or shortness of breath.     She had a kidney infection last week and was started on antibiotics. She was allergic to those and was seen here and started on Bactrim on July 23. She has completed those. She has no history of kidney stones. She has had her gallbladder and appendix out and had a tubal ligation.     Allergies:  Allergies   Allergen Reactions     Codeine      Iodine      Penicillins        Problem List:    There are no problems to display for this patient.       Past Medical History:    History reviewed. No pertinent past medical history.    Past Surgical History:    History reviewed. No pertinent surgical history.    Family History:    History reviewed. No pertinent family history.    Social History:  Marital Status:   [2]  Social History     Tobacco Use     Smoking status: Never Smoker     Smokeless tobacco: Never Used        Medications:    aspirin 81 MG EC tablet  furosemide (LASIX) 20 MG tablet  hydrochlorothiazide (HYDRODIURIL) 25 MG tablet  LEVOTHYROXINE SODIUM PO  lisinopril (ZESTRIL) 20 MG tablet  metoprolol succinate ER (TOPROL-XL) 25 MG 24 hr tablet  sulfamethoxazole-trimethoprim (BACTRIM DS) 800-160 MG tablet  Vitamin D3 (CHOLECALCIFEROL) 25 mcg (1000 units) tablet          Review of Systems   Constitutional: Positive for chills. Negative for fever.   Respiratory: Negative for cough and shortness of breath.    Gastrointestinal: Positive for abdominal pain, nausea and vomiting.   All other systems reviewed and are  "negative.      Physical Exam   BP: (!) 146/61  Pulse: 77  Height: 167.6 cm (5' 6\")  Weight: 80.3 kg (177 lb)  SpO2: 94 %      Physical Exam  Vitals and nursing note reviewed.   Constitutional:       Appearance: She is well-developed and normal weight.   Cardiovascular:      Rate and Rhythm: Normal rate and regular rhythm.      Heart sounds: Normal heart sounds. No murmur heard.  Pulmonary:      Effort: Pulmonary effort is normal.      Breath sounds: Normal breath sounds.      Comments: She has quite a bit of right sided low chest/ RUQ pain with deep breathing.  Abdominal:      General: Abdomen is protuberant. Bowel sounds are normal.      Palpations: Abdomen is soft.      Tenderness: There is abdominal tenderness in the right upper quadrant and right lower quadrant. There is guarding. There is no rebound.   Skin:     General: Skin is warm and dry.   Neurological:      General: No focal deficit present.      Mental Status: She is alert and oriented to person, place, and time.   Psychiatric:         Mood and Affect: Mood normal.         Behavior: Behavior normal.         Results for orders placed or performed during the hospital encounter of 07/30/22 (from the past 24 hour(s))   CBC with platelets differential    Narrative    The following orders were created for panel order CBC with platelets differential.  Procedure                               Abnormality         Status                     ---------                               -----------         ------                     CBC with platelets and d...[[id number]]  Abnormal            Final result                 Please view results for these tests on the individual orders.   Lactic acid whole blood   Result Value Ref Range    Lactic Acid 1.3 0.7 - 2.0 mmol/L   Lipase   Result Value Ref Range    Lipase 5 (L) 11 - 82 U/L   Comprehensive metabolic panel   Result Value Ref Range    Sodium 134 134 - 144 mmol/L    Potassium 4.2 3.5 - 5.1 mmol/L    Chloride 96 (L) 98 - " 107 mmol/L    Carbon Dioxide (CO2) 29 21 - 31 mmol/L    Anion Gap 9 3 - 14 mmol/L    Urea Nitrogen 32 (H) 7 - 25 mg/dL    Creatinine 1.77 (H) 0.60 - 1.20 mg/dL    Calcium 9.6 8.6 - 10.3 mg/dL    Glucose 145 (H) 70 - 105 mg/dL    Alkaline Phosphatase 46 34 - 104 U/L    AST 10 (L) 13 - 39 U/L    ALT 8 7 - 52 U/L    Protein Total 7.2 6.4 - 8.9 g/dL    Albumin 3.7 3.5 - 5.7 g/dL    Bilirubin Total 1.0 0.3 - 1.0 mg/dL    GFR Estimate 26 (L) >60 mL/min/1.73m2   CBC with platelets and differential   Result Value Ref Range    WBC Count 26.1 (H) 4.0 - 11.0 10e3/uL    RBC Count 3.90 3.80 - 5.20 10e6/uL    Hemoglobin 12.2 11.7 - 15.7 g/dL    Hematocrit 36.5 35.0 - 47.0 %    MCV 94 78 - 100 fL    MCH 31.3 26.5 - 33.0 pg    MCHC 33.4 31.5 - 36.5 g/dL    RDW 14.3 10.0 - 15.0 %    Platelet Count 171 150 - 450 10e3/uL    % Neutrophils 86 %    % Lymphocytes 6 %    % Monocytes 6 %    % Eosinophils 0 %    % Basophils 0 %    % Immature Granulocytes 2 %    NRBCs per 100 WBC 0 <1 /100    Absolute Neutrophils 22.5 (H) 1.6 - 8.3 10e3/uL    Absolute Lymphocytes 1.6 0.8 - 5.3 10e3/uL    Absolute Monocytes 1.5 (H) 0.0 - 1.3 10e3/uL    Absolute Eosinophils 0.0 0.0 - 0.7 10e3/uL    Absolute Basophils 0.1 0.0 - 0.2 10e3/uL    Absolute Immature Granulocytes 0.4 <=0.4 10e3/uL    Absolute NRBCs 0.0 10e3/uL   XR Chest Port 1 View    Narrative    PROCEDURE:  XR CHEST PORT 1 VIEW    HISTORY:  right lower chest/ RUQ pain, worse with deep breathing, had  GB out surgically.     COMPARISON:  2020    FINDINGS:   The heart is enlarged The pulmonary vasculature is normal.  There is  mild interstitial thickening seen in both lung bases No pleural  effusion or pneumothorax.      Impression    IMPRESSION:  Cardiomegaly. Mild interstitial thickening.      WATSON CARRANZA MD         SYSTEM ID:  [id number]   CT Abdomen Pelvis w/o Contrast    Narrative    EXAMINATION: CT ABDOMEN PELVIS W/O CONTRAST, 7/30/2022 1:57 PM    TECHNIQUE:  Helical CT images from the lung  bases through the  symphysis pubis were obtained  without IV contrast. Contrast dose:    COMPARISON: none    HISTORY: right sided abdominal pain, WBC 26,000,  has had gallbladder  out    FINDINGS:    There is dependent atelectasis at the lung bases.    The liver is free of masses or biliary ductal enlargement. The  gallbladder has been removed    The the spleen and pancreas appear normal.    The adrenal glands are normal.    The right and left kidneys are free of masses or hydronephrosis. There  is some cortical scarring noted on the right.    The periaortic lymph nodes are normal in caliber.    No intraperitoneal masses or inflammatory changes are noted.    In the pelvis the bladder appears normal. The rectum is distended with  feces.     Scoliosis and degenerative changes are present in the thoracic and  lumbar spine. Degenerative arthritic changes are seen in both hips  right worse than left      Impression    IMPRESSION: No intraperitoneal masses or inflammatory changes are  noted.     WATSON CARRANZA MD         SYSTEM ID:  [id number]   CBC with platelets differential    Narrative    The following orders were created for panel order CBC with platelets differential.  Procedure                               Abnormality         Status                     ---------                               -----------         ------                     CBC with platelets and d...[[id number]]  Abnormal            Final result                 Please view results for these tests on the individual orders.   CBC with platelets and differential   Result Value Ref Range    WBC Count 28.0 (H) 4.0 - 11.0 10e3/uL    RBC Count 3.66 (L) 3.80 - 5.20 10e6/uL    Hemoglobin 11.5 (L) 11.7 - 15.7 g/dL    Hematocrit 34.0 (L) 35.0 - 47.0 %    MCV 93 78 - 100 fL    MCH 31.4 26.5 - 33.0 pg    MCHC 33.8 31.5 - 36.5 g/dL    RDW 14.5 10.0 - 15.0 %    Platelet Count 163 150 - 450 10e3/uL    % Neutrophils 86 %    % Lymphocytes 6 %    % Monocytes 6 %    %  Eosinophils 0 %    % Basophils 0 %    % Immature Granulocytes 2 %    NRBCs per 100 WBC 0 <1 /100    Absolute Neutrophils 24.2 (H) 1.6 - 8.3 10e3/uL    Absolute Lymphocytes 1.8 0.8 - 5.3 10e3/uL    Absolute Monocytes 1.6 (H) 0.0 - 1.3 10e3/uL    Absolute Eosinophils 0.0 0.0 - 0.7 10e3/uL    Absolute Basophils 0.1 0.0 - 0.2 10e3/uL    Absolute Immature Granulocytes 0.4 <=0.4 10e3/uL    Absolute NRBCs 0.0 10e3/uL   Glucose CSF:   Result Value Ref Range    Glucose CSF 77 40 - 80 mg/dL    Narrative    CSF glucose concentrations are about 60 percent of normal plasma glucose.  CSF glucose concentrations are about 60 percent of normal plasma glucose.   Protein total CSF:   Result Value Ref Range    Protein total CSF 48 (H) 15 - 45 mg/dL    Narrative    This is a lab developed test. It has not been cleared or approved by the FDA.   Cerebrospinal fluid Aerobic Bacterial Culture Routine    Specimen: Lumbar Puncture; Cerebrospinal fluid   Result Value Ref Range    Gram Stain Result 1+ PMNs Seen     Gram Stain Result 1+ Squamous epithelial cells/low power field     Gram Stain Result No organisms seen    CSF Cell Count with Differential:    Narrative    The following orders were created for panel order CSF Cell Count with Differential:.  Procedure                               Abnormality         Status                     ---------                               -----------         ------                     Cell Count CSF[[id number]]                                   Final result                 Please view results for these tests on the individual orders.   Cell Count CSF   Result Value Ref Range    Tube Number 4     Color Colorless Colorless    Clarity Clear Clear    Total Nucleated Cells 2 0 - 5 /uL    RBC Count 1 0 - 2 /uL   UA with Microscopic reflex to Culture    Specimen: Urine, Midstream   Result Value Ref Range    Color Urine Yellow Colorless, Straw, Light Yellow, Yellow    Appearance Urine Clear Clear    Glucose Urine  "Negative Negative mg/dL    Bilirubin Urine Negative Negative    Ketones Urine Negative Negative mg/dL    Specific Gravity Urine 1.010 1.005 - 1.030    Blood Urine Negative Negative    pH Urine 6.5 5.0 - 9.0    Protein Albumin Urine Negative Negative mg/dL    Urobilinogen Urine Normal Normal, 2.0 mg/dL    Nitrite Urine Negative Negative    Leukocyte Esterase Urine Negative Negative    Bacteria Urine Few (A) None Seen /HPF    Mucus Urine Present (A) None Seen /LPF    RBC Urine 1 <=2 /HPF    WBC Urine 2 <=5 /HPF    Squamous Epithelials Urine 3 (H) <=1 /HPF    Hyaline Casts Urine 3 (H) <=2 /LPF    Narrative    Urine Culture not indicated       Medications   0.9% sodium chloride BOLUS (0 mLs Intravenous Stopped 7/30/22 1607)       Assessments & Plan (with Medical Decision Making)  Skye Baez is a 89 year old female here with right sided upper abdominal pain, worse with deep breathing. She had onset of N/V last night and today. Her pain is 2 or 3 of 10 when lying still and gets up to 10 of 10 with deep breathing. No fever but she does have chills. No cough or shortness of breath.  She had a kidney infection last week and was started on antibiotics. She was allergic to those and was seen here and started on Bactrim on July 23. She has completed those. She has no history of kidney stones. She has had her gallbladder and appendix out and had a tubal ligation.  VS in the ED /84   Pulse 72   Ht 1.676 m (5' 6\")   Wt 80.3 kg (177 lb)   SpO2 92%   BMI 28.57 kg/m    Exam shows right sided upper abdominal/ lower chest tenderness with palpation or deep breathing. She has normal bowel sounds. Heart and lungs sound normal.  She does not want any pain medicine at this time.  Labs show CBC with WBC 26,100, CMP with Cl 96, Cr 1.77, lipase normal, lactic acid normal.  Chest xray shows cardiomegaly and mild interstitial thickening.  CT abdomen and pelvis is normal.    Her daughter in law is here and said that Skye " has had elevated WBC now for about a month. Skye says that she cannot keep anything down.  We will give her IV fluids and see if that helps.   6:52 PM  Repeat CBC shows that her WBC is now 28,000.  I have ordered a CSF panel and asked the CRNA to come for an LP.   7:00 PM I am signing out her care to Dr Ochoa at change of shift.     I have reviewed the nursing notes.      Final diagnoses:   Leukocytosis, unspecified type   Generalized abdominal pain   Intractable vomiting with nausea, unspecified vomiting type       7/30/2022   [facility]  07/30/22 3430    Care of patient was turned over to myself at change of shift pending results of lumbar puncture, as above.  These are reassuring.  Patient is nontoxic-appearing, but has persistent white blood count elevation, predominantly neutrophils.  No obvious source of infection at this time.  Recently finished antibiotics for UTI as well.  She is drinking liquids and keeping these down.  We will send out peripheral smear and add an LDH to her labs.  At this point no indication for admission.  Will be discharged home but needs to follow-up with primary care provider this coming week.  Return if worse.      Denny Ochoa MD  07/30/22 7896

## 2022-07-30 NOTE — ED TRIAGE NOTES
Patient states she got abdominal pain around midnight.  States it only hurts when she takes a deep breath.  Complains n/v/d.     Triage Assessment     Row Name 07/30/22 1215       Triage Assessment (Adult)    Airway WDL WDL       Respiratory WDL    Respiratory WDL WDL       Skin Circulation/Temperature WDL    Skin Circulation/Temperature WDL WDL       Cardiac WDL    Cardiac WDL WDL       Peripheral/Neurovascular WDL    Peripheral Neurovascular WDL WDL       Cognitive/Neuro/Behavioral WDL    Cognitive/Neuro/Behavioral WDL WDL

## 2022-07-31 NOTE — ED NOTES
Prior to the start of the procedure and with procedural staff participation, I verbally confirmed the patient s identity using two indicators, relevant allergies, that the procedure was appropriate and matched the consent or emergent situation, and that the correct equipment/implants were available. Immediately prior to starting the procedure I conducted the Time Out with the procedural staff and re-confirmed the patient s name, procedure, and site/side. (The Joint Commission universal protocol was followed.)  Yes    CRNA here to do LP

## 2022-07-31 NOTE — ANESTHESIA PROCEDURE NOTES
Lumbar puncture Procedure Note    Pre-Procedure   Staff -        CRNA: Jd Neff APRN CRNA       Performed By: CRNA       Location: ED       Procedure Start/Stop Times: 7/30/2022 7:15 PM and 7/30/2022 7:36 PM       Pre-Anesthestic Checklist: patient identified, IV checked, risks and benefits discussed, informed consent, monitors and equipment checked, pre-op evaluation, at physician/surgeon's request and post-op pain management  Timeout:       Correct Patient: Yes        Correct Procedure: Yes        Correct Site: Yes        Correct Position: Yes   Procedure Documentation  Procedure: lumbar puncture       Patient Position: LLD       Skin prep: Chloraprep       Insertion Site: L3-4. (midline approach).       Needle Gauge: 22.        Needle Length (Inches): 3.5        Spinal Needle Type: José Miguel tipNo introducer used       # of attempts: 2 and  # of redirects:  3    Assessment/Narrative         Paresthesias: No.       LP fluid removal amount (ml): 4       CSF fluid: clear.       Opening pressure was 12.6 cmH2O while  Lateral.      Medication(s) Administered   Medication Administration Time: 7/30/2022 7:15 PM

## 2022-07-31 NOTE — DISCHARGE INSTRUCTIONS
Call your doctor right away Monday morning to schedule follow-up.  We did a number of tests today to look for any sign of infection, however we are unable to find anything.  We are going to send out some other tests that will be done sometime next week and your doctor can follow-up with these.  Return if worse.

## 2022-08-03 LAB — PATH REPORT.FINAL DX SPEC: NORMAL

## 2022-08-05 LAB
BACTERIA CSF CULT: NO GROWTH
GRAM STAIN RESULT: NORMAL

## 2022-08-11 ENCOUNTER — LAB (OUTPATIENT)
Dept: LAB | Facility: OTHER | Age: 87
End: 2022-08-11
Attending: FAMILY MEDICINE
Payer: MEDICARE

## 2022-08-11 DIAGNOSIS — R10.13 ABDOMINAL PAIN, EPIGASTRIC: Primary | ICD-10-CM

## 2022-08-11 LAB — H PYLORI AG STL QL IA: NEGATIVE

## 2022-08-11 PROCEDURE — 87338 HPYLORI STOOL AG IA: CPT | Mod: ZL

## 2023-01-01 ENCOUNTER — PATIENT OUTREACH (OUTPATIENT)
Dept: FAMILY MEDICINE | Facility: OTHER | Age: 88
End: 2023-01-01
Payer: COMMERCIAL

## 2023-01-01 ENCOUNTER — HOSPITAL ENCOUNTER (EMERGENCY)
Facility: OTHER | Age: 88
Discharge: HOME | End: 2023-07-12
Attending: FAMILY MEDICINE | Admitting: FAMILY MEDICINE
Payer: MEDICARE

## 2023-01-01 ENCOUNTER — OFFICE VISIT (OUTPATIENT)
Dept: FAMILY MEDICINE | Facility: OTHER | Age: 88
End: 2023-01-01
Attending: NURSE PRACTITIONER
Payer: MEDICARE

## 2023-01-01 ENCOUNTER — APPOINTMENT (OUTPATIENT)
Dept: CARDIOLOGY | Facility: OTHER | Age: 88
DRG: 871 | End: 2023-01-01
Attending: HOSPITALIST
Payer: MEDICARE

## 2023-01-01 ENCOUNTER — OFFICE VISIT (OUTPATIENT)
Dept: FAMILY MEDICINE | Facility: OTHER | Age: 88
End: 2023-01-01
Payer: COMMERCIAL

## 2023-01-01 ENCOUNTER — APPOINTMENT (OUTPATIENT)
Dept: OCCUPATIONAL THERAPY | Facility: OTHER | Age: 88
DRG: 871 | End: 2023-01-01
Attending: FAMILY MEDICINE
Payer: MEDICARE

## 2023-01-01 ENCOUNTER — TELEPHONE (OUTPATIENT)
Dept: FAMILY MEDICINE | Facility: OTHER | Age: 88
End: 2023-01-01

## 2023-01-01 ENCOUNTER — HOSPITAL ENCOUNTER (INPATIENT)
Facility: OTHER | Age: 88
LOS: 2 days | Discharge: HOME HEALTH | DRG: 871 | End: 2023-11-10
Attending: FAMILY MEDICINE | Admitting: HOSPITALIST
Payer: MEDICARE

## 2023-01-01 ENCOUNTER — HOSPITAL ENCOUNTER (OUTPATIENT)
Dept: GENERAL RADIOLOGY | Facility: OTHER | Age: 88
Discharge: HOME | End: 2023-07-12
Attending: NURSE PRACTITIONER
Payer: MEDICARE

## 2023-01-01 ENCOUNTER — APPOINTMENT (OUTPATIENT)
Dept: GENERAL RADIOLOGY | Facility: OTHER | Age: 88
DRG: 871 | End: 2023-01-01
Attending: FAMILY MEDICINE
Payer: MEDICARE

## 2023-01-01 ENCOUNTER — DOCUMENTATION ONLY (OUTPATIENT)
Dept: OTHER | Facility: CLINIC | Age: 88
End: 2023-01-01
Payer: COMMERCIAL

## 2023-01-01 ENCOUNTER — APPOINTMENT (OUTPATIENT)
Dept: PHYSICAL THERAPY | Facility: OTHER | Age: 88
DRG: 871 | End: 2023-01-01
Attending: FAMILY MEDICINE
Payer: MEDICARE

## 2023-01-01 VITALS
WEIGHT: 138 LBS | BODY MASS INDEX: 22.18 KG/M2 | HEIGHT: 66 IN | TEMPERATURE: 97.2 F | RESPIRATION RATE: 16 BRPM | DIASTOLIC BLOOD PRESSURE: 68 MMHG | HEART RATE: 81 BPM | OXYGEN SATURATION: 100 % | SYSTOLIC BLOOD PRESSURE: 110 MMHG

## 2023-01-01 VITALS
SYSTOLIC BLOOD PRESSURE: 168 MMHG | TEMPERATURE: 97.7 F | OXYGEN SATURATION: 95 % | DIASTOLIC BLOOD PRESSURE: 74 MMHG | BODY MASS INDEX: 21.63 KG/M2 | HEART RATE: 82 BPM | WEIGHT: 134 LBS | RESPIRATION RATE: 16 BRPM

## 2023-01-01 VITALS
WEIGHT: 137.4 LBS | SYSTOLIC BLOOD PRESSURE: 106 MMHG | TEMPERATURE: 97.1 F | RESPIRATION RATE: 18 BRPM | OXYGEN SATURATION: 99 % | DIASTOLIC BLOOD PRESSURE: 52 MMHG | HEART RATE: 88 BPM | BODY MASS INDEX: 22.18 KG/M2

## 2023-01-01 VITALS
BODY MASS INDEX: 22.08 KG/M2 | TEMPERATURE: 97.1 F | WEIGHT: 137.4 LBS | HEART RATE: 86 BPM | RESPIRATION RATE: 16 BRPM | DIASTOLIC BLOOD PRESSURE: 55 MMHG | SYSTOLIC BLOOD PRESSURE: 106 MMHG | OXYGEN SATURATION: 98 % | HEIGHT: 66 IN

## 2023-01-01 DIAGNOSIS — L97.511 SKIN ULCER OF TOE OF RIGHT FOOT, LIMITED TO BREAKDOWN OF SKIN (H): ICD-10-CM

## 2023-01-01 DIAGNOSIS — A49.1 STREPTOCOCCUS PNEUMONIAE INFECTION: ICD-10-CM

## 2023-01-01 DIAGNOSIS — L03.115 CELLULITIS OF RIGHT LEG: ICD-10-CM

## 2023-01-01 DIAGNOSIS — M1A.9XX1 TOPHI: ICD-10-CM

## 2023-01-01 DIAGNOSIS — E03.9 HYPOTHYROIDISM, UNSPECIFIED TYPE: ICD-10-CM

## 2023-01-01 DIAGNOSIS — M79.671 RIGHT FOOT PAIN: ICD-10-CM

## 2023-01-01 DIAGNOSIS — M20.41 HAMMERTOE OF RIGHT FOOT: ICD-10-CM

## 2023-01-01 DIAGNOSIS — M79.674 PAIN OF TOE OF RIGHT FOOT: ICD-10-CM

## 2023-01-01 DIAGNOSIS — Z20.822 LAB TEST NEGATIVE FOR COVID-19 VIRUS: Primary | ICD-10-CM

## 2023-01-01 DIAGNOSIS — M10.9 ACUTE GOUT INVOLVING TOE OF RIGHT FOOT, UNSPECIFIED CAUSE: Primary | ICD-10-CM

## 2023-01-01 DIAGNOSIS — N39.0 UTI DUE TO KLEBSIELLA SPECIES: ICD-10-CM

## 2023-01-01 DIAGNOSIS — M79.674 PAIN OF TOE OF RIGHT FOOT: Primary | ICD-10-CM

## 2023-01-01 DIAGNOSIS — L03.116 CELLULITIS OF LEFT LOWER EXTREMITY: ICD-10-CM

## 2023-01-01 DIAGNOSIS — M81.0 OSTEOPOROSIS WITHOUT CURRENT PATHOLOGICAL FRACTURE, UNSPECIFIED OSTEOPOROSIS TYPE: ICD-10-CM

## 2023-01-01 DIAGNOSIS — B96.89 UTI DUE TO KLEBSIELLA SPECIES: ICD-10-CM

## 2023-01-01 DIAGNOSIS — I48.91 ATRIAL FIBRILLATION, UNSPECIFIED TYPE (H): ICD-10-CM

## 2023-01-01 LAB
ALBUMIN UR-MCNC: 10 MG/DL
ALLEN'S TEST: NO
ANION GAP SERPL CALCULATED.3IONS-SCNC: 11 MMOL/L (ref 7–15)
ANION GAP SERPL CALCULATED.3IONS-SCNC: 12 MMOL/L (ref 7–15)
ANION GAP SERPL CALCULATED.3IONS-SCNC: 9 MMOL/L (ref 7–15)
APPEARANCE UR: ABNORMAL
BACTERIA #/AREA URNS HPF: ABNORMAL /HPF
BACTERIA BLD CULT: NO GROWTH
BACTERIA UR CULT: ABNORMAL
BASE EXCESS BLDA CALC-SCNC: 6.7 MMOL/L (ref -9–1.8)
BASOPHILS # BLD AUTO: 0 10E3/UL (ref 0–0.2)
BASOPHILS # BLD AUTO: 0.1 10E3/UL (ref 0–0.2)
BASOPHILS NFR BLD AUTO: 0 %
BASOPHILS NFR BLD AUTO: 1 %
BILIRUB UR QL STRIP: NEGATIVE
BUN SERPL-MCNC: 20.2 MG/DL (ref 8–23)
BUN SERPL-MCNC: 21.5 MG/DL (ref 8–23)
BUN SERPL-MCNC: 22 MG/DL (ref 8–23)
BUN SERPL-MCNC: 22.4 MG/DL (ref 8–23)
BUN SERPL-MCNC: 24.4 MG/DL (ref 8–23)
BUN SERPL-MCNC: 24.4 MG/DL (ref 8–23)
CALCIUM SERPL-MCNC: 8.1 MG/DL (ref 8.2–9.6)
CALCIUM SERPL-MCNC: 8.2 MG/DL (ref 8.2–9.6)
CALCIUM SERPL-MCNC: 8.6 MG/DL (ref 8.2–9.6)
CALCIUM SERPL-MCNC: 9.1 MG/DL (ref 8.2–9.6)
CALCIUM SERPL-MCNC: 9.1 MG/DL (ref 8.2–9.6)
CALCIUM SERPL-MCNC: 9.2 MG/DL (ref 8.2–9.6)
CHLORIDE SERPL-SCNC: 101 MMOL/L (ref 98–107)
CHLORIDE SERPL-SCNC: 101 MMOL/L (ref 98–107)
CHLORIDE SERPL-SCNC: 102 MMOL/L (ref 98–107)
CHLORIDE SERPL-SCNC: 104 MMOL/L (ref 98–107)
CHLORIDE SERPL-SCNC: 99 MMOL/L (ref 98–107)
CHLORIDE SERPL-SCNC: 99 MMOL/L (ref 98–107)
COLOR UR AUTO: YELLOW
CREAT SERPL-MCNC: 1.25 MG/DL (ref 0.51–0.95)
CREAT SERPL-MCNC: 1.26 MG/DL (ref 0.51–0.95)
CREAT SERPL-MCNC: 1.35 MG/DL (ref 0.51–0.95)
CREAT SERPL-MCNC: 1.37 MG/DL (ref 0.51–0.95)
CREAT SERPL-MCNC: 1.63 MG/DL (ref 0.51–0.95)
CREAT SERPL-MCNC: 1.66 MG/DL (ref 0.51–0.95)
DEPRECATED HCO3 PLAS-SCNC: 24 MMOL/L (ref 22–29)
DEPRECATED HCO3 PLAS-SCNC: 25 MMOL/L (ref 22–29)
DEPRECATED HCO3 PLAS-SCNC: 26 MMOL/L (ref 22–29)
DEPRECATED HCO3 PLAS-SCNC: 26 MMOL/L (ref 22–29)
DEPRECATED HCO3 PLAS-SCNC: 29 MMOL/L (ref 22–29)
DEPRECATED HCO3 PLAS-SCNC: 30 MMOL/L (ref 22–29)
EGFRCR SERPLBLD CKD-EPI 2021: 35 ML/MIN/1.73M2
EGFRCR SERPLBLD CKD-EPI 2021: 39 ML/MIN/1.73M2
EGFRCR SERPLBLD CKD-EPI 2021: 39 ML/MIN/1.73M2
EOSINOPHIL # BLD AUTO: 0.1 10E3/UL (ref 0–0.7)
EOSINOPHIL # BLD AUTO: 0.1 10E3/UL (ref 0–0.7)
EOSINOPHIL NFR BLD AUTO: 0 %
EOSINOPHIL NFR BLD AUTO: 1 %
ERYTHROCYTE [DISTWIDTH] IN BLOOD BY AUTOMATED COUNT: 13.3 % (ref 10–15)
ERYTHROCYTE [DISTWIDTH] IN BLOOD BY AUTOMATED COUNT: 16.7 % (ref 10–15)
ERYTHROCYTE [DISTWIDTH] IN BLOOD BY AUTOMATED COUNT: 16.7 % (ref 10–15)
ERYTHROCYTE [DISTWIDTH] IN BLOOD BY AUTOMATED COUNT: 16.8 % (ref 10–15)
FERRITIN SERPL-MCNC: 936 NG/ML (ref 11–328)
FLUAV RNA SPEC QL NAA+PROBE: NEGATIVE
FLUBV RNA RESP QL NAA+PROBE: NEGATIVE
FOLATE SERPL-MCNC: 7.5 NG/ML (ref 4.6–34.8)
GFR SERPL CREATININE-BSD FRML MDRD: 28 ML/MIN/1.73M2
GFR SERPL CREATININE-BSD FRML MDRD: 29 ML/MIN/1.73M2
GFR SERPL CREATININE-BSD FRML MDRD: 36 ML/MIN/1.73M2
GLUCOSE SERPL-MCNC: 108 MG/DL (ref 70–99)
GLUCOSE SERPL-MCNC: 113 MG/DL (ref 70–99)
GLUCOSE SERPL-MCNC: 116 MG/DL (ref 70–99)
GLUCOSE SERPL-MCNC: 124 MG/DL (ref 70–99)
GLUCOSE SERPL-MCNC: 139 MG/DL (ref 70–99)
GLUCOSE SERPL-MCNC: 95 MG/DL (ref 70–99)
GLUCOSE UR STRIP-MCNC: NEGATIVE MG/DL
HCO3 BLD-SCNC: 32 MMOL/L (ref 21–28)
HCT VFR BLD AUTO: 25.7 % (ref 35–47)
HCT VFR BLD AUTO: 26.8 % (ref 35–47)
HCT VFR BLD AUTO: 29.6 % (ref 35–47)
HCT VFR BLD AUTO: 34.3 % (ref 35–47)
HGB BLD-MCNC: 10.8 G/DL (ref 11.7–15.7)
HGB BLD-MCNC: 8.4 G/DL (ref 11.7–15.7)
HGB BLD-MCNC: 8.7 G/DL (ref 11.7–15.7)
HGB BLD-MCNC: 9.8 G/DL (ref 11.7–15.7)
HGB UR QL STRIP: NEGATIVE
HOLD SPECIMEN: NORMAL
HYALINE CASTS: 8 /LPF
IMM GRANULOCYTES # BLD: 0.1 10E3/UL
IMM GRANULOCYTES # BLD: 0.1 10E3/UL
IMM GRANULOCYTES NFR BLD: 1 %
IMM GRANULOCYTES NFR BLD: 1 %
IRON BINDING CAPACITY (ROCHE): 91 UG/DL (ref 240–430)
IRON SATN MFR SERPL: 23 % (ref 15–46)
IRON SERPL-MCNC: 21 UG/DL (ref 37–145)
KETONES UR STRIP-MCNC: NEGATIVE MG/DL
L PNEUMO1 AG UR QL IA: NEGATIVE
LACTATE SERPL-SCNC: 1.1 MMOL/L (ref 0.7–2)
LEUKOCYTE ESTERASE UR QL STRIP: ABNORMAL
LVEF ECHO: NORMAL
LYMPHOCYTES # BLD AUTO: 1.2 10E3/UL (ref 0.8–5.3)
LYMPHOCYTES # BLD AUTO: 1.6 10E3/UL (ref 0.8–5.3)
LYMPHOCYTES NFR BLD AUTO: 16 %
LYMPHOCYTES NFR BLD AUTO: 8 %
MCH RBC QN AUTO: 30.9 PG (ref 26.5–33)
MCH RBC QN AUTO: 32.1 PG (ref 26.5–33)
MCH RBC QN AUTO: 32.3 PG (ref 26.5–33)
MCH RBC QN AUTO: 32.6 PG (ref 26.5–33)
MCHC RBC AUTO-ENTMCNC: 31.5 G/DL (ref 31.5–36.5)
MCHC RBC AUTO-ENTMCNC: 32.5 G/DL (ref 31.5–36.5)
MCHC RBC AUTO-ENTMCNC: 32.7 G/DL (ref 31.5–36.5)
MCHC RBC AUTO-ENTMCNC: 33.1 G/DL (ref 31.5–36.5)
MCV RBC AUTO: 100 FL (ref 78–100)
MCV RBC AUTO: 98 FL (ref 78–100)
MONOCYTES # BLD AUTO: 0.6 10E3/UL (ref 0–1.3)
MONOCYTES # BLD AUTO: 0.7 10E3/UL (ref 0–1.3)
MONOCYTES NFR BLD AUTO: 4 %
MONOCYTES NFR BLD AUTO: 6 %
MRSA DNA SPEC QL NAA+PROBE: NEGATIVE
MUCOUS THREADS #/AREA URNS LPF: PRESENT /LPF
NEUTROPHILS # BLD AUTO: 13.2 10E3/UL (ref 1.6–8.3)
NEUTROPHILS # BLD AUTO: 7.8 10E3/UL (ref 1.6–8.3)
NEUTROPHILS NFR BLD AUTO: 75 %
NEUTROPHILS NFR BLD AUTO: 87 %
NITRATE UR QL: NEGATIVE
NRBC # BLD AUTO: 0 10E3/UL
NRBC # BLD AUTO: 0 10E3/UL
NRBC BLD AUTO-RTO: 0 /100
NRBC BLD AUTO-RTO: 0 /100
NT-PROBNP SERPL-MCNC: 6372 PG/ML (ref 0–1800)
O2/TOTAL GAS SETTING VFR VENT: 21 %
OXYHGB MFR BLD: 94 % (ref 92–100)
PCO2 BLD: 46 MM HG (ref 35–45)
PH BLD: 7.45 [PH] (ref 7.35–7.45)
PH UR STRIP: 5 [PH] (ref 5–9)
PLATELET # BLD AUTO: 134 10E3/UL (ref 150–450)
PLATELET # BLD AUTO: 156 10E3/UL (ref 150–450)
PLATELET # BLD AUTO: 157 10E3/UL (ref 150–450)
PLATELET # BLD AUTO: 180 10E3/UL (ref 150–450)
PO2 BLD: 72 MM HG (ref 80–105)
POTASSIUM SERPL-SCNC: 3.7 MMOL/L (ref 3.4–5.3)
POTASSIUM SERPL-SCNC: 3.7 MMOL/L (ref 3.4–5.3)
POTASSIUM SERPL-SCNC: 3.8 MMOL/L (ref 3.4–5.3)
POTASSIUM SERPL-SCNC: 3.9 MMOL/L (ref 3.4–5.3)
POTASSIUM SERPL-SCNC: 3.9 MMOL/L (ref 3.4–5.3)
POTASSIUM SERPL-SCNC: 4.1 MMOL/L (ref 3.4–5.3)
PROCALCITONIN SERPL IA-MCNC: 0.84 NG/ML
RBC # BLD AUTO: 2.62 10E6/UL (ref 3.8–5.2)
RBC # BLD AUTO: 2.69 10E6/UL (ref 3.8–5.2)
RBC # BLD AUTO: 3.01 10E6/UL (ref 3.8–5.2)
RBC # BLD AUTO: 3.49 10E6/UL (ref 3.8–5.2)
RBC URINE: 0 /HPF
RETICS # AUTO: 0.05 10E6/UL (ref 0.03–0.1)
RETICS/RBC NFR AUTO: 2 % (ref 0.5–2)
RSV RNA SPEC NAA+PROBE: NEGATIVE
S PNEUM AG SPEC QL: POSITIVE
SA TARGET DNA: NEGATIVE
SARS-COV-2 RNA RESP QL NAA+PROBE: NEGATIVE
SODIUM SERPL-SCNC: 136 MMOL/L (ref 135–145)
SODIUM SERPL-SCNC: 137 MMOL/L (ref 136–145)
SODIUM SERPL-SCNC: 137 MMOL/L (ref 136–145)
SODIUM SERPL-SCNC: 138 MMOL/L (ref 135–145)
SODIUM SERPL-SCNC: 138 MMOL/L (ref 136–145)
SODIUM SERPL-SCNC: 139 MMOL/L (ref 135–145)
SP GR UR STRIP: 1.02 (ref 1–1.03)
T4 FREE SERPL-MCNC: 2.42 NG/DL (ref 0.9–1.7)
TSH SERPL DL<=0.005 MIU/L-ACNC: 0.11 UIU/ML (ref 0.3–4.2)
URATE SERPL-MCNC: 3.8 MG/DL (ref 2.4–5.7)
URATE SERPL-MCNC: 8.1 MG/DL (ref 2.4–5.7)
URATE SERPL-MCNC: 8.8 MG/DL (ref 2.4–5.7)
UROBILINOGEN UR STRIP-MCNC: 3 MG/DL
VIT B12 SERPL-MCNC: 394 PG/ML (ref 232–1245)
WBC # BLD AUTO: 10.2 10E3/UL (ref 4–11)
WBC # BLD AUTO: 10.7 10E3/UL (ref 4–11)
WBC # BLD AUTO: 15.3 10E3/UL (ref 4–11)
WBC # BLD AUTO: 7.6 10E3/UL (ref 4–11)
WBC URINE: 5 /HPF

## 2023-01-01 PROCEDURE — 36415 COLL VENOUS BLD VENIPUNCTURE: CPT | Performed by: FAMILY MEDICINE

## 2023-01-01 PROCEDURE — 80048 BASIC METABOLIC PNL TOTAL CA: CPT | Performed by: FAMILY MEDICINE

## 2023-01-01 PROCEDURE — 87641 MR-STAPH DNA AMP PROBE: CPT | Performed by: HOSPITALIST

## 2023-01-01 PROCEDURE — 87899 AGENT NOS ASSAY W/OPTIC: CPT | Performed by: HOSPITALIST

## 2023-01-01 PROCEDURE — 85027 COMPLETE CBC AUTOMATED: CPT | Performed by: HOSPITALIST

## 2023-01-01 PROCEDURE — 97161 PT EVAL LOW COMPLEX 20 MIN: CPT | Mod: GP

## 2023-01-01 PROCEDURE — 258N000003 HC RX IP 258 OP 636: Performed by: HOSPITALIST

## 2023-01-01 PROCEDURE — 80048 BASIC METABOLIC PNL TOTAL CA: CPT | Mod: ZL | Performed by: NURSE PRACTITIONER

## 2023-01-01 PROCEDURE — 87637 SARSCOV2&INF A&B&RSV AMP PRB: CPT | Performed by: FAMILY MEDICINE

## 2023-01-01 PROCEDURE — 84550 ASSAY OF BLOOD/URIC ACID: CPT | Mod: ZL | Performed by: NURSE PRACTITIONER

## 2023-01-01 PROCEDURE — 87040 BLOOD CULTURE FOR BACTERIA: CPT | Performed by: FAMILY MEDICINE

## 2023-01-01 PROCEDURE — 82746 ASSAY OF FOLIC ACID SERUM: CPT | Performed by: HOSPITALIST

## 2023-01-01 PROCEDURE — 85014 HEMATOCRIT: CPT | Mod: ZL | Performed by: NURSE PRACTITIONER

## 2023-01-01 PROCEDURE — 84145 PROCALCITONIN (PCT): CPT | Performed by: FAMILY MEDICINE

## 2023-01-01 PROCEDURE — 250N000013 HC RX MED GY IP 250 OP 250 PS 637: Performed by: HOSPITALIST

## 2023-01-01 PROCEDURE — 93306 TTE W/DOPPLER COMPLETE: CPT | Mod: 26 | Performed by: INTERNAL MEDICINE

## 2023-01-01 PROCEDURE — 80048 BASIC METABOLIC PNL TOTAL CA: CPT | Performed by: HOSPITALIST

## 2023-01-01 PROCEDURE — 99239 HOSP IP/OBS DSCHRG MGMT >30: CPT | Performed by: FAMILY MEDICINE

## 2023-01-01 PROCEDURE — 85045 AUTOMATED RETICULOCYTE COUNT: CPT | Performed by: HOSPITALIST

## 2023-01-01 PROCEDURE — 82805 BLOOD GASES W/O2 SATURATION: CPT | Performed by: FAMILY MEDICINE

## 2023-01-01 PROCEDURE — 250N000011 HC RX IP 250 OP 636: Mod: JZ | Performed by: HOSPITALIST

## 2023-01-01 PROCEDURE — 71045 X-RAY EXAM CHEST 1 VIEW: CPT

## 2023-01-01 PROCEDURE — 99285 EMERGENCY DEPT VISIT HI MDM: CPT | Performed by: FAMILY MEDICINE

## 2023-01-01 PROCEDURE — 99204 OFFICE O/P NEW MOD 45 MIN: CPT | Performed by: NURSE PRACTITIONER

## 2023-01-01 PROCEDURE — C9803 HOPD COVID-19 SPEC COLLECT: HCPCS | Performed by: FAMILY MEDICINE

## 2023-01-01 PROCEDURE — 85025 COMPLETE CBC W/AUTO DIFF WBC: CPT | Performed by: FAMILY MEDICINE

## 2023-01-01 PROCEDURE — 83550 IRON BINDING TEST: CPT | Performed by: HOSPITALIST

## 2023-01-01 PROCEDURE — 120N000001 HC R&B MED SURG/OB

## 2023-01-01 PROCEDURE — 84550 ASSAY OF BLOOD/URIC ACID: CPT | Performed by: FAMILY MEDICINE

## 2023-01-01 PROCEDURE — 99284 EMERGENCY DEPT VISIT MOD MDM: CPT | Performed by: FAMILY MEDICINE

## 2023-01-01 PROCEDURE — 36415 COLL VENOUS BLD VENIPUNCTURE: CPT | Performed by: HOSPITALIST

## 2023-01-01 PROCEDURE — 99223 1ST HOSP IP/OBS HIGH 75: CPT | Mod: AI | Performed by: HOSPITALIST

## 2023-01-01 PROCEDURE — 36415 COLL VENOUS BLD VENIPUNCTURE: CPT | Mod: ZL | Performed by: NURSE PRACTITIONER

## 2023-01-01 PROCEDURE — G0463 HOSPITAL OUTPT CLINIC VISIT: HCPCS

## 2023-01-01 PROCEDURE — 97116 GAIT TRAINING THERAPY: CPT | Mod: GP

## 2023-01-01 PROCEDURE — 250N000011 HC RX IP 250 OP 636: Performed by: HOSPITALIST

## 2023-01-01 PROCEDURE — 99283 EMERGENCY DEPT VISIT LOW MDM: CPT | Performed by: FAMILY MEDICINE

## 2023-01-01 PROCEDURE — 258N000003 HC RX IP 258 OP 636: Performed by: INTERNAL MEDICINE

## 2023-01-01 PROCEDURE — 250N000011 HC RX IP 250 OP 636: Mod: JZ | Performed by: FAMILY MEDICINE

## 2023-01-01 PROCEDURE — 87086 URINE CULTURE/COLONY COUNT: CPT | Performed by: FAMILY MEDICINE

## 2023-01-01 PROCEDURE — 84443 ASSAY THYROID STIM HORMONE: CPT | Performed by: HOSPITALIST

## 2023-01-01 PROCEDURE — 97165 OT EVAL LOW COMPLEX 30 MIN: CPT | Mod: GO | Performed by: OCCUPATIONAL THERAPIST

## 2023-01-01 PROCEDURE — 73630 X-RAY EXAM OF FOOT: CPT | Mod: RT

## 2023-01-01 PROCEDURE — 81001 URINALYSIS AUTO W/SCOPE: CPT | Performed by: FAMILY MEDICINE

## 2023-01-01 PROCEDURE — 82607 VITAMIN B-12: CPT | Performed by: HOSPITALIST

## 2023-01-01 PROCEDURE — 97530 THERAPEUTIC ACTIVITIES: CPT | Mod: GP

## 2023-01-01 PROCEDURE — 99213 OFFICE O/P EST LOW 20 MIN: CPT | Performed by: NURSE PRACTITIONER

## 2023-01-01 PROCEDURE — 96374 THER/PROPH/DIAG INJ IV PUSH: CPT | Performed by: FAMILY MEDICINE

## 2023-01-01 PROCEDURE — 99285 EMERGENCY DEPT VISIT HI MDM: CPT | Mod: 25 | Performed by: FAMILY MEDICINE

## 2023-01-01 PROCEDURE — 250N000013 HC RX MED GY IP 250 OP 250 PS 637: Performed by: FAMILY MEDICINE

## 2023-01-01 PROCEDURE — 93306 TTE W/DOPPLER COMPLETE: CPT

## 2023-01-01 PROCEDURE — 83605 ASSAY OF LACTIC ACID: CPT | Performed by: FAMILY MEDICINE

## 2023-01-01 PROCEDURE — 82728 ASSAY OF FERRITIN: CPT | Performed by: HOSPITALIST

## 2023-01-01 PROCEDURE — 84439 ASSAY OF FREE THYROXINE: CPT | Performed by: HOSPITALIST

## 2023-01-01 PROCEDURE — 83880 ASSAY OF NATRIURETIC PEPTIDE: CPT | Performed by: HOSPITALIST

## 2023-01-01 PROCEDURE — 97530 THERAPEUTIC ACTIVITIES: CPT | Mod: GO | Performed by: OCCUPATIONAL THERAPIST

## 2023-01-01 PROCEDURE — 36600 WITHDRAWAL OF ARTERIAL BLOOD: CPT | Performed by: FAMILY MEDICINE

## 2023-01-01 PROCEDURE — 99232 SBSQ HOSP IP/OBS MODERATE 35: CPT | Performed by: HOSPITALIST

## 2023-01-01 PROCEDURE — 258N000003 HC RX IP 258 OP 636: Performed by: FAMILY MEDICINE

## 2023-01-01 RX ORDER — ALLOPURINOL 100 MG/1
200 TABLET ORAL DAILY
Status: ON HOLD | COMMUNITY
Start: 2023-01-01 | End: 2024-01-01

## 2023-01-01 RX ORDER — VANCOMYCIN HYDROCHLORIDE 500 MG/10ML
500 INJECTION, POWDER, LYOPHILIZED, FOR SOLUTION INTRAVENOUS EVERY 24 HOURS
Status: DISCONTINUED | OUTPATIENT
Start: 2023-01-01 | End: 2023-01-01

## 2023-01-01 RX ORDER — LISINOPRIL 20 MG/1
20 TABLET ORAL DAILY
Status: DISCONTINUED | OUTPATIENT
Start: 2023-01-01 | End: 2023-01-01 | Stop reason: HOSPADM

## 2023-01-01 RX ORDER — PANTOPRAZOLE SODIUM 40 MG/1
40 TABLET, DELAYED RELEASE ORAL EVERY MORNING
COMMUNITY
Start: 2023-01-01

## 2023-01-01 RX ORDER — GABAPENTIN 100 MG/1
100 CAPSULE ORAL 2 TIMES DAILY
Qty: 6 CAPSULE | Refills: 0 | Status: ON HOLD | OUTPATIENT
Start: 2023-01-01 | End: 2023-01-01

## 2023-01-01 RX ORDER — ENOXAPARIN SODIUM 100 MG/ML
30 INJECTION SUBCUTANEOUS EVERY 24 HOURS
Status: DISCONTINUED | OUTPATIENT
Start: 2023-01-01 | End: 2023-01-01 | Stop reason: HOSPADM

## 2023-01-01 RX ORDER — SODIUM CHLORIDE, SODIUM LACTATE, POTASSIUM CHLORIDE, CALCIUM CHLORIDE 600; 310; 30; 20 MG/100ML; MG/100ML; MG/100ML; MG/100ML
INJECTION, SOLUTION INTRAVENOUS CONTINUOUS
Status: DISCONTINUED | OUTPATIENT
Start: 2023-01-01 | End: 2023-01-01

## 2023-01-01 RX ORDER — MUPIROCIN 20 MG/G
OINTMENT TOPICAL
Refills: 0 | OUTPATIENT
Start: 2023-01-01

## 2023-01-01 RX ORDER — LEVOTHYROXINE SODIUM 100 UG/1
100 TABLET ORAL DAILY
Qty: 90 TABLET | Refills: 0 | Status: SHIPPED | OUTPATIENT
Start: 2023-01-01

## 2023-01-01 RX ORDER — METOPROLOL SUCCINATE 25 MG/1
25 TABLET, EXTENDED RELEASE ORAL DAILY
Status: DISCONTINUED | OUTPATIENT
Start: 2023-01-01 | End: 2023-01-01 | Stop reason: HOSPADM

## 2023-01-01 RX ORDER — LEVOTHYROXINE SODIUM 125 UG/1
125 TABLET ORAL DAILY
Status: ON HOLD | COMMUNITY
Start: 2023-01-01 | End: 2023-01-01

## 2023-01-01 RX ORDER — NALOXONE HYDROCHLORIDE 0.4 MG/ML
0.2 INJECTION, SOLUTION INTRAMUSCULAR; INTRAVENOUS; SUBCUTANEOUS
Status: DISCONTINUED | OUTPATIENT
Start: 2023-01-01 | End: 2023-01-01 | Stop reason: HOSPADM

## 2023-01-01 RX ORDER — GABAPENTIN 100 MG/1
200 CAPSULE ORAL
Qty: 30 CAPSULE | Refills: 0 | OUTPATIENT
Start: 2023-01-01

## 2023-01-01 RX ORDER — GABAPENTIN 100 MG/1
200 CAPSULE ORAL
Qty: 30 CAPSULE | Refills: 0 | Status: SHIPPED | OUTPATIENT
Start: 2023-01-01

## 2023-01-01 RX ORDER — CEFTRIAXONE 2 G/1
2 INJECTION, POWDER, FOR SOLUTION INTRAMUSCULAR; INTRAVENOUS EVERY 24 HOURS
Status: DISCONTINUED | OUTPATIENT
Start: 2023-01-01 | End: 2023-01-01 | Stop reason: HOSPADM

## 2023-01-01 RX ORDER — LEVOTHYROXINE SODIUM 100 UG/1
100 TABLET ORAL DAILY
Status: DISCONTINUED | OUTPATIENT
Start: 2023-01-01 | End: 2023-01-01 | Stop reason: HOSPADM

## 2023-01-01 RX ORDER — HYDROCHLOROTHIAZIDE 25 MG/1
25 TABLET ORAL DAILY
Status: DISCONTINUED | OUTPATIENT
Start: 2023-01-01 | End: 2023-01-01

## 2023-01-01 RX ORDER — VITAMIN B COMPLEX
25 TABLET ORAL DAILY
Status: DISCONTINUED | OUTPATIENT
Start: 2023-01-01 | End: 2023-01-01

## 2023-01-01 RX ORDER — MUPIROCIN 20 MG/G
OINTMENT TOPICAL 2 TIMES DAILY
Qty: 22 G | Refills: 0 | Status: SHIPPED | OUTPATIENT
Start: 2023-01-01 | End: 2023-01-01

## 2023-01-01 RX ORDER — NALOXONE HYDROCHLORIDE 0.4 MG/ML
0.4 INJECTION, SOLUTION INTRAMUSCULAR; INTRAVENOUS; SUBCUTANEOUS
Status: DISCONTINUED | OUTPATIENT
Start: 2023-01-01 | End: 2023-01-01 | Stop reason: HOSPADM

## 2023-01-01 RX ORDER — LEVOTHYROXINE SODIUM 125 UG/1
125 TABLET ORAL DAILY
Status: DISCONTINUED | OUTPATIENT
Start: 2023-01-01 | End: 2023-01-01

## 2023-01-01 RX ORDER — ONDANSETRON 2 MG/ML
4 INJECTION INTRAMUSCULAR; INTRAVENOUS EVERY 6 HOURS PRN
Status: DISCONTINUED | OUTPATIENT
Start: 2023-01-01 | End: 2023-01-01 | Stop reason: HOSPADM

## 2023-01-01 RX ORDER — PANTOPRAZOLE SODIUM 40 MG/1
40 TABLET, DELAYED RELEASE ORAL EVERY MORNING
Status: DISCONTINUED | OUTPATIENT
Start: 2023-01-01 | End: 2023-01-01 | Stop reason: HOSPADM

## 2023-01-01 RX ORDER — CEFUROXIME AXETIL 500 MG/1
500 TABLET ORAL 2 TIMES DAILY
Qty: 10 TABLET | Refills: 0 | Status: SHIPPED | OUTPATIENT
Start: 2023-01-01 | End: 2023-01-01

## 2023-01-01 RX ORDER — GABAPENTIN 100 MG/1
200 CAPSULE ORAL ONCE
Status: COMPLETED | OUTPATIENT
Start: 2023-01-01 | End: 2023-01-01

## 2023-01-01 RX ORDER — PREDNISONE 20 MG/1
TABLET ORAL
Qty: 26 TABLET | Refills: 0 | Status: SHIPPED | OUTPATIENT
Start: 2023-01-01 | End: 2023-01-01

## 2023-01-01 RX ORDER — ASPIRIN 81 MG/1
81 TABLET ORAL DAILY
Status: DISCONTINUED | OUTPATIENT
Start: 2023-01-01 | End: 2023-01-01 | Stop reason: HOSPADM

## 2023-01-01 RX ORDER — PREDNISONE 20 MG/1
TABLET ORAL
Qty: 20 TABLET | Refills: 0 | Status: CANCELLED | OUTPATIENT
Start: 2023-01-01

## 2023-01-01 RX ORDER — CEFTRIAXONE 1 G/1
1 INJECTION, POWDER, FOR SOLUTION INTRAMUSCULAR; INTRAVENOUS ONCE
Status: COMPLETED | OUTPATIENT
Start: 2023-01-01 | End: 2023-01-01

## 2023-01-01 RX ORDER — MUPIROCIN 20 MG/G
OINTMENT TOPICAL 2 TIMES DAILY
Status: DISCONTINUED | OUTPATIENT
Start: 2023-01-01 | End: 2023-01-01

## 2023-01-01 RX ORDER — FUROSEMIDE 20 MG
20 TABLET ORAL DAILY
Status: DISCONTINUED | OUTPATIENT
Start: 2023-01-01 | End: 2023-01-01

## 2023-01-01 RX ORDER — AZITHROMYCIN 500 MG/5ML
500 INJECTION, POWDER, LYOPHILIZED, FOR SOLUTION INTRAVENOUS ONCE
Status: COMPLETED | OUTPATIENT
Start: 2023-01-01 | End: 2023-01-01

## 2023-01-01 RX ORDER — ONDANSETRON 4 MG/1
4 TABLET, ORALLY DISINTEGRATING ORAL EVERY 6 HOURS PRN
Status: DISCONTINUED | OUTPATIENT
Start: 2023-01-01 | End: 2023-01-01 | Stop reason: HOSPADM

## 2023-01-01 RX ORDER — FUROSEMIDE 10 MG/ML
40 INJECTION INTRAMUSCULAR; INTRAVENOUS EVERY 12 HOURS
Status: DISCONTINUED | OUTPATIENT
Start: 2023-01-01 | End: 2023-01-01 | Stop reason: HOSPADM

## 2023-01-01 RX ORDER — ACETAMINOPHEN 650 MG/1
650 SUPPOSITORY RECTAL EVERY 6 HOURS PRN
Status: DISCONTINUED | OUTPATIENT
Start: 2023-01-01 | End: 2023-01-01 | Stop reason: HOSPADM

## 2023-01-01 RX ORDER — COLCHICINE 0.6 MG/1
0.3 TABLET ORAL 2 TIMES DAILY
Qty: 5 TABLET | Refills: 0 | Status: SHIPPED | OUTPATIENT
Start: 2023-01-01 | End: 2023-01-01

## 2023-01-01 RX ORDER — GABAPENTIN 100 MG/1
200 CAPSULE ORAL
Status: DISCONTINUED | OUTPATIENT
Start: 2023-01-01 | End: 2023-01-01 | Stop reason: HOSPADM

## 2023-01-01 RX ORDER — POLYETHYLENE GLYCOL 3350 17 G/17G
17 POWDER, FOR SOLUTION ORAL DAILY
Status: DISCONTINUED | OUTPATIENT
Start: 2023-01-01 | End: 2023-01-01 | Stop reason: HOSPADM

## 2023-01-01 RX ORDER — ACETAMINOPHEN 325 MG/1
650 TABLET ORAL EVERY 6 HOURS PRN
Status: DISCONTINUED | OUTPATIENT
Start: 2023-01-01 | End: 2023-01-01 | Stop reason: HOSPADM

## 2023-01-01 RX ADMIN — METOPROLOL SUCCINATE 25 MG: 25 TABLET, EXTENDED RELEASE ORAL at 11:14

## 2023-01-01 RX ADMIN — ASPIRIN 81 MG: 81 TABLET, COATED ORAL at 11:14

## 2023-01-01 RX ADMIN — FUROSEMIDE 20 MG: 20 TABLET ORAL at 14:51

## 2023-01-01 RX ADMIN — LISINOPRIL 20 MG: 20 TABLET ORAL at 11:14

## 2023-01-01 RX ADMIN — GABAPENTIN 200 MG: 100 CAPSULE ORAL at 22:58

## 2023-01-01 RX ADMIN — CEFTRIAXONE 2 G: 2 INJECTION, POWDER, FOR SOLUTION INTRAMUSCULAR; INTRAVENOUS at 22:35

## 2023-01-01 RX ADMIN — AZITHROMYCIN MONOHYDRATE 250 MG: 500 INJECTION, POWDER, LYOPHILIZED, FOR SOLUTION INTRAVENOUS at 09:40

## 2023-01-01 RX ADMIN — POLYETHYLENE GLYCOL 3350 17 G: 17 POWDER, FOR SOLUTION ORAL at 14:51

## 2023-01-01 RX ADMIN — VANCOMYCIN HYDROCHLORIDE 1250 MG: 1 INJECTION, POWDER, LYOPHILIZED, FOR SOLUTION INTRAVENOUS at 15:54

## 2023-01-01 RX ADMIN — METOPROLOL SUCCINATE 25 MG: 25 TABLET, EXTENDED RELEASE ORAL at 09:32

## 2023-01-01 RX ADMIN — LEVOTHYROXINE SODIUM 125 MCG: 0.12 TABLET ORAL at 09:32

## 2023-01-01 RX ADMIN — PANTOPRAZOLE SODIUM 40 MG: 40 TABLET, DELAYED RELEASE ORAL at 09:32

## 2023-01-01 RX ADMIN — SODIUM CHLORIDE, POTASSIUM CHLORIDE, SODIUM LACTATE AND CALCIUM CHLORIDE: 600; 310; 30; 20 INJECTION, SOLUTION INTRAVENOUS at 20:27

## 2023-01-01 RX ADMIN — FUROSEMIDE 40 MG: 10 INJECTION, SOLUTION INTRAMUSCULAR; INTRAVENOUS at 06:41

## 2023-01-01 RX ADMIN — AZITHROMYCIN MONOHYDRATE 500 MG: 500 INJECTION, POWDER, LYOPHILIZED, FOR SOLUTION INTRAVENOUS at 17:44

## 2023-01-01 RX ADMIN — SODIUM CHLORIDE, POTASSIUM CHLORIDE, SODIUM LACTATE AND CALCIUM CHLORIDE: 600; 310; 30; 20 INJECTION, SOLUTION INTRAVENOUS at 01:31

## 2023-01-01 RX ADMIN — GABAPENTIN 200 MG: 100 CAPSULE ORAL at 18:43

## 2023-01-01 RX ADMIN — POLYETHYLENE GLYCOL 3350 17 G: 17 POWDER, FOR SOLUTION ORAL at 11:17

## 2023-01-01 RX ADMIN — ENOXAPARIN SODIUM 30 MG: 30 INJECTION SUBCUTANEOUS at 22:35

## 2023-01-01 RX ADMIN — OXYCODONE HYDROCHLORIDE 2.5 MG: 5 TABLET ORAL at 18:42

## 2023-01-01 RX ADMIN — OXYCODONE HYDROCHLORIDE 2.5 MG: 5 TABLET ORAL at 22:35

## 2023-01-01 RX ADMIN — OXYCODONE HYDROCHLORIDE 2.5 MG: 5 TABLET ORAL at 18:20

## 2023-01-01 RX ADMIN — FUROSEMIDE 40 MG: 10 INJECTION, SOLUTION INTRAMUSCULAR; INTRAVENOUS at 17:29

## 2023-01-01 RX ADMIN — GABAPENTIN 200 MG: 100 CAPSULE ORAL at 18:20

## 2023-01-01 RX ADMIN — SODIUM CHLORIDE 500 ML: 9 INJECTION, SOLUTION INTRAVENOUS at 11:13

## 2023-01-01 RX ADMIN — LISINOPRIL 20 MG: 20 TABLET ORAL at 09:33

## 2023-01-01 RX ADMIN — CEFTRIAXONE SODIUM 1 G: 1 INJECTION, POWDER, FOR SOLUTION INTRAMUSCULAR; INTRAVENOUS at 11:14

## 2023-01-01 RX ADMIN — PANTOPRAZOLE SODIUM 40 MG: 40 TABLET, DELAYED RELEASE ORAL at 11:14

## 2023-01-01 RX ADMIN — CEFTRIAXONE 2 G: 2 INJECTION, POWDER, FOR SOLUTION INTRAMUSCULAR; INTRAVENOUS at 14:03

## 2023-01-01 RX ADMIN — LEVOTHYROXINE SODIUM 100 MCG: 0.1 TABLET ORAL at 11:14

## 2023-01-01 RX ADMIN — ASPIRIN 81 MG: 81 TABLET, COATED ORAL at 14:51

## 2023-01-01 RX ADMIN — POLYETHYLENE GLYCOL 3350 17 G: 17 POWDER, FOR SOLUTION ORAL at 09:33

## 2023-01-01 RX ADMIN — FUROSEMIDE 40 MG: 10 INJECTION, SOLUTION INTRAMUSCULAR; INTRAVENOUS at 17:43

## 2023-01-01 RX ADMIN — ASPIRIN 81 MG: 81 TABLET, COATED ORAL at 09:31

## 2023-01-01 RX ADMIN — ENOXAPARIN SODIUM 30 MG: 30 INJECTION SUBCUTANEOUS at 23:32

## 2023-01-01 RX ADMIN — CEFTRIAXONE 2 G: 2 INJECTION, POWDER, FOR SOLUTION INTRAMUSCULAR; INTRAVENOUS at 23:32

## 2023-01-01 RX ADMIN — AZITHROMYCIN MONOHYDRATE 250 MG: 500 INJECTION, POWDER, LYOPHILIZED, FOR SOLUTION INTRAVENOUS at 11:19

## 2023-01-01 ASSESSMENT — ACTIVITIES OF DAILY LIVING (ADL)
ADLS_ACUITY_SCORE: 48
ADLS_ACUITY_SCORE: 35
ADLS_ACUITY_SCORE: 49
ADLS_ACUITY_SCORE: 48
ADLS_ACUITY_SCORE: 48
ADLS_ACUITY_SCORE: 33
ADLS_ACUITY_SCORE: 48
ADLS_ACUITY_SCORE: 35
ADLS_ACUITY_SCORE: 48
ADLS_ACUITY_SCORE: 48
ADLS_ACUITY_SCORE: 36
ADLS_ACUITY_SCORE: 49
ADLS_ACUITY_SCORE: 48
ADLS_ACUITY_SCORE: 35
ADLS_ACUITY_SCORE: 48
ADLS_ACUITY_SCORE: 35
ADLS_ACUITY_SCORE: 35
ADLS_ACUITY_SCORE: 48
ADLS_ACUITY_SCORE: 35
ADLS_ACUITY_SCORE: 48

## 2023-01-01 ASSESSMENT — PAIN SCALES - GENERAL
PAINLEVEL: MODERATE PAIN (5)
PAINLEVEL: NO PAIN (0)

## 2023-01-01 ASSESSMENT — ENCOUNTER SYMPTOMS: ARTHRALGIAS: 1

## 2023-07-12 PROBLEM — M79.674 PAIN OF TOE OF RIGHT FOOT: Status: ACTIVE | Noted: 2023-01-01

## 2023-07-12 NOTE — PATIENT INSTRUCTIONS
Foot x-ray-right foot, no signs of fracture or dislocation.  Does show osteoarthritis in your joints, multiple hammertoe deformities which can be uncomfortable.    Bacitracin twice daily to ulcers, covering with Mepilex foam for protection.    Shoes on while up and ambulating, otherwise have shoes off while resting in chair bed to prevent further irritation and pressure    Offloading weight with pillows.  May consider waffle boots or Prevalon boots to help distribute weight and prevent further ulcers.  May discuss having this ordered by primary care provider or may look at medical store.    Reposition at least every 2 hours while in bed.    Should ulcers open, become more red, warm, irritated, develop drainage or if patient develops fever I recommend prompt reevaluation as this could be signs for infection.

## 2023-07-12 NOTE — NURSING NOTE
Patient is here with daughter in law for right foot pain. States has been about 3 week, c/o toes on right foot hurting. Daughter in law states she had a fall around [facility].    Maylin Dave LPN .............7/12/2023     11:27 AM

## 2023-07-12 NOTE — PROGRESS NOTES
[facility]  7/3/1927    ASSESSMENT/PLAN:   1. Pain of toe of right foot  2. Hammertoe of right foot  Three weeks of worsening right foot and toe discomfort.  Due to history of osteoporosis recommend right foot x-ray to rule out acute fracture or dislocation.  Right foot x-ray returned negative for acute fracture.  It did show osteopenia/osteoporosis, multiple hammertoe deformities, hallux valgus deformity, scattered mild osteoarthritis.  Results reviewed in clinic with patient and daughter-in-law.  - XR Foot Right G/E 3 Views; Future  - Extra SST Tube (LAB USE ONLY)    3. Skin ulcer of toe of right foot, limited to breakdown of skin (H)  Reassurance provided to patient and daughter-in-law that findings are most consistent with pressure ulcer of right toes, worse at right fifth toe, third toe and bunion of great toe.  Skin is intact.  Fifth toe ulcer is nonblanching and most tender.  Remaining erythema lesions are blanching.  No warmth, or drainage.  I have little concern for secondary infection however we will obtain CBC and BMP today.  Lab work returned stable, WBC within normal limits, this has previously been significantly more elevated.  I called patient's nursing home to discuss treatment options with the nurse.  We discussed trialing daily Bactroban ointment twice daily to right toe ulcers and covering with Mepilex foam for protective barrier to lesions.  We discussed offloading weight with pillows and possibly considering Prevalon boots while patient is in bed.  Reviewed repositioning patient in bed every 1-2 hours.  I recommend removal of shoes after patient is done ambulating, when she is resting to prevent further irritation to skin.  They will monitor for secondary signs of infection and return if symptoms worsen.  Patient does have follow-up with her PCP in about 3 weeks.  - CBC and Differential; Future  - Basic Metabolic Panel; Future  - mupirocin (BACTROBAN) 2 % external ointment; Apply  topically 2 times daily Right toes/ulcers  Dispense: 22 g; Refill: 0    4. Osteoporosis without current pathological fracture, unspecified osteoporosis type  Due to history of osteoporosis, recommend right foot x-ray to rule out acute fracture or bony abnormality.    5. Atrial fibrillation, unspecified type (H)  Heart rate is irregular, patient is on aspirin 81 mg daily and metoprolol succinate ER 25 mg daily.  Vital signs are stable.  Patient follows with PCP.    Patient agrees with plan of care and verbalizes understating. AVS printed. Patient education provided verbally and written instructions provided as requested. Patient made aware of emergent sings and symptoms to monitor for and when to seek additional care/follow up.     SUBJECTIVE:   CHIEF COMPLAINT/ REASON FOR VISIT  Patient presents with:  Foot Pain     HISTORY OF PRESENT ILLNESS  Skye Baez is a pleasant 89 year old female [facility] with concerns for right foot and toe pain.  She is accompanied by her daughter-in-law Daphne, her son Vinnie is in the waiting room.  Patient resides at home and comfort living in [facility].    Patient states her right foot and toes are causing her pain.  This is started about 3 weeks ago.  She is up at night due to the discomfort and right foot toe and pain.  Her fifth toe is the worst.  He does have a few sores on her toes, they have been covered with Band-Aids during the day.  No specific injury to her foot that she is aware of.  Patient is sleeping in her bed, she is repositioned at night.  She uses a walker to ambulate to the bathroom.     Daughter-in-law shares that patient did fall [place], she was using the walker to go to the bathroom and she fell down.  No injuries were sustained, she did not patient was not evaluated after fall in the clinic/or emergency department.    I have reviewed the nursing notes.  I have reviewed allergies, medication list, problem list, and  past medical history.    REVIEW OF SYSTEMS  Review of Systems   Musculoskeletal: Positive for arthralgias.        Right foot, toe pain.  Ulcer on right fifth toe   All other systems reviewed and are negative.       VITAL SIGNS  Vitals:    07/12/23 1123   BP: 106/52   Pulse: 88   Resp: 18   Temp: 97.1  F (36.2  C)   TempSrc: Temporal   SpO2: 99%   Weight: 62.3 kg (137 lb 6.4 oz)      Body mass index is 22.18 kg/m .    OBJECTIVE:   PHYSICAL EXAM  Physical Exam  Vitals reviewed.   Constitutional:       Appearance: Normal appearance. She is not ill-appearing or toxic-appearing.   HENT:      Head: Normocephalic and atraumatic.      Nose: Nose normal.   Cardiovascular:      Rate and Rhythm: Rhythm irregular.      Heart sounds: Murmur heard.   Pulmonary:      Effort: Pulmonary effort is normal.      Breath sounds: Normal breath sounds. No wheezing.   Musculoskeletal:      Right foot: Deformity, bunion and prominent metatarsal heads present.   Feet:      Right foot:      Skin integrity: Ulcer, skin breakdown and fissure present. No erythema.      Toenail Condition: Right toenails are abnormally thick and ingrown. Fungal disease present.     Comments: Right hammertoes and bunion.  Blanchable erythema over bunion  Fifth toe visible skin breakdown, erythema, dry flaky skin.  Ulcer intact, nonblanching.  Tender to touch.  Neurological:      General: No focal deficit present.      Mental Status: She is alert and oriented to person, place, and time.   Psychiatric:         Mood and Affect: Mood normal.         Behavior: Behavior normal.         Thought Content: Thought content normal.         Judgment: Judgment normal.        DIAGNOSTICS  Results for orders placed or performed during the hospital encounter of 07/12/23   XR Foot Right G/E 3 Views     Status: None    Narrative    PROCEDURE:  XR FOOT RIGHT G/E 3 VIEWS    HISTORY: Pain of toe of right foot.    COMPARISON:  None.    TECHNIQUE:  3 views right foot.    FINDINGS:   Osteopenia/osteoporosis. Multiple hammertoe deformities.  Hallux valgus deformity. Scattered mild osteoarthritis. No evidence of  acute fracture.       Impression    IMPRESSION: No evidence of acute fracture. Consider follow-up if pain  persists.      CHERISE SMITH MD         SYSTEM ID:  [id number]   Results for orders placed or performed in visit on 07/12/23   Basic Metabolic Panel     Status: Abnormal   Result Value Ref Range    Sodium 137 136 - 145 mmol/L    Potassium 3.8 3.4 - 5.3 mmol/L    Chloride 101 98 - 107 mmol/L    Carbon Dioxide (CO2) 24 22 - 29 mmol/L    Anion Gap 12 7 - 15 mmol/L    Urea Nitrogen 24.4 (H) 8.0 - 23.0 mg/dL    Creatinine 1.63 (H) 0.51 - 0.95 mg/dL    Calcium 9.1 8.2 - 9.6 mg/dL    Glucose 139 (H) 70 - 99 mg/dL    GFR Estimate 29 (L) >60 mL/min/1.73m2   CBC with platelets and differential     Status: Abnormal   Result Value Ref Range    WBC Count 10.2 4.0 - 11.0 10e3/uL    RBC Count 3.49 (L) 3.80 - 5.20 10e6/uL    Hemoglobin 10.8 (L) 11.7 - 15.7 g/dL    Hematocrit 34.3 (L) 35.0 - 47.0 %    MCV 98 78 - 100 fL    MCH 30.9 26.5 - 33.0 pg    MCHC 31.5 31.5 - 36.5 g/dL    RDW 13.3 10.0 - 15.0 %    Platelet Count 180 150 - 450 10e3/uL    % Neutrophils 75 %    % Lymphocytes 16 %    % Monocytes 6 %    % Eosinophils 1 %    % Basophils 1 %    % Immature Granulocytes 1 %    NRBCs per 100 WBC 0 <1 /100    Absolute Neutrophils 7.8 1.6 - 8.3 10e3/uL    Absolute Lymphocytes 1.6 0.8 - 5.3 10e3/uL    Absolute Monocytes 0.6 0.0 - 1.3 10e3/uL    Absolute Eosinophils 0.1 0.0 - 0.7 10e3/uL    Absolute Basophils 0.1 0.0 - 0.2 10e3/uL    Absolute Immature Granulocytes 0.1 <=0.4 10e3/uL    Absolute NRBCs 0.0 10e3/uL   Extra SST Tube (LAB USE ONLY)     Status: None   Result Value Ref Range    Hold Specimen JIC    CBC and Differential     Status: Abnormal    Narrative    The following orders were created for panel order CBC and Differential.  Procedure                               Abnormality         Status                      ---------                               -----------         ------                     CBC with platelets and d...[[id number]]  Abnormal            Final result                 Please view results for these tests on the individual orders.        Clarissa Maldonado NP  [facility]

## 2023-07-13 NOTE — DISCHARGE INSTRUCTIONS
Labs are not yet back.  If the uric acid level is not elevated this is not gout and I would not take the medication called colchicine.      If this is not caused by gout it is okay to use the gabapentin which is a nerve pain medication at night to help you sleep.  Additional doses can be used during the day if you needed if you are having pain symptoms.  This can be discussed with your regular doctor at your appointment in 2 weeks.      You may have gout or other arthritis of your toes causing the discomfort.  Okay to use a topical cream.  For the pain and to help with sleep tonight have given you gabapentin.  I have ordered colchicine which is a medication to help with the symptoms.  Take one tab twice daily for the next 24 hours then one tab daily. This needs to be dosed at a lower dose based on your kidneys.  We can also use indomethacin which is an anti-inflammatory but can be hard on the kidneys as well.  Do not use indomethacin with ibuprofen.  Be cautious using ibuprofen for pain with your chronic kidney disease. If pain is not resolved in 3 days, pls call your regular doctor or return to [facility] to discuss further treatment with colchicine or indomethacin.

## 2023-07-13 NOTE — ED PROVIDER NOTES
History     Chief Complaint   Patient presents with     Foot Pain     HPI  Skye Baez is a 89 year old female who presents for right toe pain.  All of her toes are sore.  She had a fall in April.  Since that time she has had discomfort.  She has not yet been given anything for the pain.  No injury.  X-rays have been done due to the pain and have all been negative.  She does not normally ambulate without assistance.  She was seen in urgent care earlier today.  A cream was prescribed but this has not yet been delivered for them.  As such she has not tried anything for the pain other than ibuprofen which was not helpful.  No fevers or chills.  She describes the pain as a jumping pain of her toes.  It does not radiate up her feet.    Allergies:  Allergies   Allergen Reactions     Codeine      Iodine      Penicillins        Problem List:    Patient Active Problem List    Diagnosis Date Noted     Pain of toe of right foot 07/12/2023     Priority: Medium        Past Medical History:    No past medical history on file.    Past Surgical History:    No past surgical history on file.    Family History:    No family history on file.    Social History:  Marital Status:   [2]  Social History     Tobacco Use     Smoking status: Never     Passive exposure: Past     Smokeless tobacco: Never   Vaping Use     Vaping Use: Never used        Medications:    colchicine (COLCRYS) 0.6 MG tablet  gabapentin (NEURONTIN) 100 MG capsule  aspirin 81 MG EC tablet  furosemide (LASIX) 20 MG tablet  hydrochlorothiazide (HYDRODIURIL) 25 MG tablet  LEVOTHYROXINE SODIUM PO  lisinopril (ZESTRIL) 20 MG tablet  metoprolol succinate ER (TOPROL-XL) 25 MG 24 hr tablet  mupirocin (BACTROBAN) 2 % external ointment  pantoprazole (PROTONIX) 40 MG EC tablet  sulfamethoxazole-trimethoprim (BACTRIM DS) 800-160 MG tablet  Vitamin D3 (CHOLECALCIFEROL) 25 mcg (1000 units) tablet          Review of Systems    Physical Exam   BP: 106/55  Pulse:  "86  Temp: 97.1  F (36.2  C)  Resp: 16  Height: 167.6 cm (5' 6\")  Weight: 62.3 kg (137 lb 6.4 oz)  SpO2: 98 %      Physical Exam  Constitutional:       Appearance: Normal appearance.   Skin:     General: Skin is warm and dry.      Comments: Swelling of all of the toes of the right foot.  Joints with swelling.  Tender to palpation.  Tophi present.   Neurological:      Mental Status: She is alert.         ED Course              ED Course as of 07/12/23 2301 Wed Jul 12, 2023 2237 Patient had a fall back in April.  Had injury to her toes.  Imaging has been done and there have been no fractures found.  However, she has had ongoing pain of the toes.  She was seen in [facility] clinic earlier today and a stat was ordered but not yet available.  Toes were wrapped.  However pain became so severe tonight she could not sleep and she is brought in by staff     Procedures              Critical Care time:  none               Results for orders placed or performed during the hospital encounter of 07/12/23 (from the past 24 hour(s))   XR Foot Right G/E 3 Views    Narrative    PROCEDURE:  XR FOOT RIGHT G/E 3 VIEWS    HISTORY: Pain of toe of right foot.    COMPARISON:  None.    TECHNIQUE:  3 views right foot.    FINDINGS:  Osteopenia/osteoporosis. Multiple hammertoe deformities.  Hallux valgus deformity. Scattered mild osteoarthritis. No evidence of  acute fracture.       Impression    IMPRESSION: No evidence of acute fracture. Consider follow-up if pain  persists.      CHERISE SMITH MD         SYSTEM ID:  [id number]       Medications   gabapentin (NEURONTIN) capsule 200 mg (200 mg Oral $Given 7/12/23 3790)       Assessments & Plan (with Medical Decision Making)     I have reviewed the nursing notes.    I have reviewed the findings, diagnosis, plan and need for follow up with the patient.           Medical Decision Making  The patient's presentation was of straightforward complexity (a clearly self-limited or minor problem).    The " patient's evaluation involved:  ordering and/or review of 2 test(s) in this encounter (see separate area of note for details)    The patient's management necessitated moderate risk (prescription drug management including medications given in the ED).        New Prescriptions    COLCHICINE (COLCRYS) 0.6 MG TABLET    Take 0.5 tablets (0.3 mg) by mouth 2 times daily for 5 days    GABAPENTIN (NEURONTIN) 100 MG CAPSULE    Take 2 capsules (200 mg) by mouth nightly as needed for neuropathic pain       Final diagnoses:   Pain of toe of right foot   Tophi   There is swelling of the toes.  Small area of tophi in the third toe.  She has chronic kidney disease.  As such NSAIDs and colchicine should be dosed accordingly.  Gave her low-dose of gabapentin to help with sleep for tonight.  Labs were ordered.  Uric acid not yet back.  If not elevated would not give the colchicine.  If it is elevated will treat as appropriate per discussion with patient and her caregiver.  [place]  Instructions per AVS.  If she is having ongoing pain not related to gout it would be okay to use gabapentin as needed at night to help with sleep versus other sleep aid per discussion with her regular doctor going forward.    7/12/2023   [facility]     Jane Marcus DO  07/12/23 7405

## 2023-07-13 NOTE — ED TRIAGE NOTES
"Pt here with family.  [facility].  Pt states that she has \"sore toes\" on her right foot.  Pt was seen in [facility] this morning for this and prescribed salve but now is here because she cannot sleep due to the pain.  This pain has been going on for the past 3 weeks.     Triage Assessment     Row Name 07/12/23 9235       Triage Assessment (Adult)    Airway WDL WDL       Respiratory WDL    Respiratory WDL WDL       Skin Circulation/Temperature WDL    Skin Circulation/Temperature WDL WDL       Cardiac WDL    Cardiac WDL WDL       Peripheral/Neurovascular WDL    Peripheral Neurovascular WDL WDL              "

## 2023-07-20 NOTE — PROGRESS NOTES
ASSESSMENT/PLAN:    I have reviewed the nursing notes.  I have reviewed the findings, diagnosis, plan and need for follow up with the patient.    1. Acute gout involving toe of right foot, unspecified cause  2. Right foot pain  - Basic Metabolic Panel  - Uric acid  - predniSONE (DELTASONE) 20 MG tablet; Take 3 tablets (60 mg) by mouth daily for 4 days, THEN 2 tablets (40 mg) daily for 4 days, THEN 1 tablet (20 mg) daily for 4 days, THEN 0.5 tablets (10 mg) daily for 4 days. Take 3 tabs by mouth daily x 3 days, then 2 tabs daily x 3 days, then 1 tab daily x 3 days, then 1/2 tab daily x 3 days.  Dispense: 26 tablet; Refill: 0  - gabapentin (NEURONTIN) 100 MG capsule; Take 1 capsule (100 mg) by mouth 2 times daily for 3 days  Dispense: 6 capsule; Refill: 0  Was diagnosed with gout recently during an ER visit with no improvement on colchicine. It sounds like her gout had been ongoing several weeks prior to treatment so possibly colchicine was not as helpful given the duration of symptoms. Prescribed prednisone taper; repeat uric acid level remains elevated toady. Recommend follow up with PCP; she does have upcoming appointment scheduled. Discussed concerns/risks of prednisone with patient and daughter in law in office.     Discussed warning signs/symptoms indicative of need to f/u    Follow up if symptoms persist or worsen or concerns    I explained my diagnostic considerations and recommendations to the patient, who voiced understanding and agreement with the treatment plan. All questions were answered. We discussed potential side effects of any prescribed or recommended therapies, as well as expectations for response to treatments.    Nery Tejeda NP  7/20/2023  10:45 AM    HPI:  Skye Baez is a 89 year old female who [facility] for concerns of right foot pain. Here with daughter in law. She lives at home and comfort.     She tells me that she was seen in the emergency department  and treated with colchicine for 5 days as well as gabapentin for the pain.  The pain has been severe and is keeping her up at night.  She does not feel any improvement since being on this medication for diagnosed gout.  It is a fourth and fifth toes of her right foot that are the most painful at this time but all of them do hurt and are red and swollen.  No fevers.  No injuries or other new concerns.  Her PCP is [place]. [place] [facility].  She has an upcoming appointment with Dr. Deborah Hart on August 9 with labs to be drawn one week prior on August 2.    ROS otherwise negative.     No past medical history on file.  No past surgical history on file.  Social History     Tobacco Use     Smoking status: Never     Passive exposure: Past     Smokeless tobacco: Never   Substance Use Topics     Alcohol use: Not on file     Current Outpatient Medications   Medication Sig Dispense Refill     aspirin 81 MG EC tablet Take 81 mg by mouth daily       Cholecalciferol (VITAMIN D3) 1.25 MG (95927 UT) TABS TAKE 1 CAPSULE (125MCG) BY MOUTH EVERY DAY       furosemide (LASIX) 20 MG tablet Take 20 mg by mouth daily       gabapentin (NEURONTIN) 100 MG capsule Take 1 capsule (100 mg) by mouth 2 times daily for 3 days 6 capsule 0     gabapentin (NEURONTIN) 100 MG capsule Take 2 capsules (200 mg) by mouth nightly as needed for neuropathic pain 30 capsule 0     hydrochlorothiazide (HYDRODIURIL) 25 MG tablet Take 25 mg by mouth daily       LEVOTHYROXINE SODIUM PO Take 137 mcg by mouth daily       lisinopril (ZESTRIL) 20 MG tablet Take 20 mg by mouth daily       metoprolol succinate ER (TOPROL-XL) 25 MG 24 hr tablet Take 25 mg by mouth daily       mupirocin (BACTROBAN) 2 % external ointment Apply topically 2 times daily Right toes/ulcers 22 g 0     pantoprazole (PROTONIX) 40 MG EC tablet Take 40 mg by mouth every morning       predniSONE (DELTASONE) 20 MG tablet Take 3 tablets (60 mg) by mouth daily for 4 days, THEN  "2 tablets (40 mg) daily for 4 days, THEN 1 tablet (20 mg) daily for 4 days, THEN 0.5 tablets (10 mg) daily for 4 days. Take 3 tabs by mouth daily x 3 days, then 2 tabs daily x 3 days, then 1 tab daily x 3 days, then 1/2 tab daily x 3 days. 26 tablet 0     sulfamethoxazole-trimethoprim (BACTRIM DS) 800-160 MG tablet Take 1 tablet by mouth 2 times daily       Vitamin D3 (CHOLECALCIFEROL) 25 mcg (1000 units) tablet Take 1 tablet by mouth daily       Allergies   Allergen Reactions     Celecoxib Hives     Codeine      Iodine      Penicillins      Past medical history, past surgical history, current medications and allergies reviewed and accurate to the best of my knowledge.      ROS:  Refer to HPI    /68 (BP Location: Left arm, Patient Position: Sitting, Cuff Size: Adult Regular)   Pulse 81   Temp 97.2  F (36.2  C) (Tympanic)   Resp 16   Ht 1.676 m (5' 6\")   Wt 62.6 kg (138 lb)   LMP  (LMP Unknown)   SpO2 100%   BMI 22.27 kg/m      EXAM:  General Appearance: Well appearing 89 year old female, appropriate appearance for age. No acute distress   Respiratory: No increased work of breathing.  No cough appreciated.  Musculoskeletal:  Equal movement of bilateral upper extremities.  Equal movement of bilateral lower extremities.  Normal gait.    Foot:+ toes of right foot are erythematous, mild to moderately swollen, and warm. 3rd-5th toe are most bothersome, tender. No open skin or drainage.   Psychological: normal affect, alert, oriented, and pleasant.     Results for orders placed or performed in visit on 07/20/23   Basic Metabolic Panel     Status: Abnormal   Result Value Ref Range    Sodium 138 136 - 145 mmol/L    Potassium 4.1 3.4 - 5.3 mmol/L    Chloride 102 98 - 107 mmol/L    Carbon Dioxide (CO2) 25 22 - 29 mmol/L    Anion Gap 11 7 - 15 mmol/L    Urea Nitrogen 22.4 8.0 - 23.0 mg/dL    Creatinine 1.35 (H) 0.51 - 0.95 mg/dL    Calcium 8.6 8.2 - 9.6 mg/dL    Glucose 124 (H) 70 - 99 mg/dL    GFR Estimate 36 (L) " >60 mL/min/1.73m2   Uric acid     Status: Abnormal   Result Value Ref Range    Uric Acid 8.1 (H) 2.4 - 5.7 mg/dL

## 2023-07-20 NOTE — NURSING NOTE
Chief Complaint   Patient presents with     Musculoskeletal Problem     Patient in clinic with daughter in law  tx with ice and gabapentin and tylenol    Previously tx for gout.  Sores on toes being treated with salve  Xrays taken at last visit in clinic.    Advanced Care Planning on file? yes    Medication Review Completed: complete    FOOD SECURITY SCREENING QUESTIONS:    The next two questions are to help us understand your food security.  If you are feeling you need any assistance in this area, we have resources available to support you today.    Hunger Vital Signs:  Within the past 12 months we worried whether our food would run out before we got money to buy more. Never  Within the past 12 months the food we bought just didn't last and we didn't have money to get more. Never    Zeina Gonzales LPN

## 2023-07-20 NOTE — TELEPHONE ENCOUNTER
Farzana called from [facility]. Needs clarification on the prednisone. Please call.    Razia Ochoa on 7/20/2023 at 12:13 PM

## 2023-07-20 NOTE — TELEPHONE ENCOUNTER
I spoke to Rema and she is wondering which set of directions pt is supposed to take the prednisone for.  Per verbal order by Nery Tejeda, pt is to do the 4 days sig.  Inna Ewing CMA ([facility])......................7/20/2023  2:16 PM

## 2023-10-25 NOTE — TELEPHONE ENCOUNTER
Request denied. Updated pharmacy to contact PCP as this was a limited script. Non [facility] Provider. Meryl Lyles RN  ....................  10/25/2023   5:12 PM

## 2023-11-01 NOTE — TELEPHONE ENCOUNTER
[id number] [place] sent Rx request for the following:      Requested Prescriptions   Pending Prescriptions Disp Refills    gabapentin (NEURONTIN) 100 MG capsule 30 capsule 0     Sig: Take 2 capsules (200 mg) by mouth nightly as needed for neuropathic pain       There is no refill protocol information for this order        Last Prescription Date:   7/12/23  Last Fill Qty/Refills:         30, R-0      No PCP at [facility]. PCP listed as Kristina Fields of [facility]. Pharmacy notified. Linda Felix RN .............. 11/1/2023  4:25 PM

## 2023-11-08 PROBLEM — L03.116 CELLULITIS OF LEFT LOWER EXTREMITY: Status: ACTIVE | Noted: 2023-01-01

## 2023-11-08 NOTE — PROGRESS NOTES
Per lab they were only able to secure 3/4 culture vials.     Spoke with Dr. Marcus.  She is ok with just 3 vials for the cultures, no more additional lab is needed at this time.  It is possible that pt will become a comfort cares pt after she is admitted to the hospital.  Maribell Tanner RN on 11/8/2023 at 1:15 PM

## 2023-11-08 NOTE — PROGRESS NOTES
:    Patient presented to the ED with complaints of fatigue.    Patient is from [facility]     will continue to follow as needed for discharge planning needs.    ALBAN Dukes on 11/8/2023 at 11:00 AM

## 2023-11-08 NOTE — PROGRESS NOTES
Patient received in MED Surg Room 301 at 1330.       SAFETY CHECKLIST  ID Bands and Risk clasps correct and in place (DNR, Fall risk, Allergy, Latex, Limb):  Yes  All Lines Reconciled and labeled correctly: Yes  Whiteboard updated:Yes  Environmental interventions: Yes  Verify Tele #:

## 2023-11-08 NOTE — H&P
[facility]    History and Physical - Hospitalist Service       Date of Admission:  11/8/2023    Assessment & Plan      Skye Baez is a 89 year old female admitted on 11/8/2023. She has history of hypertension, gout, hypothyroidism and mild dementia resident of nursing home was taken to the ED after she was found unresponsive.  She has the following acute medical issues:    Acute toxic metabolic encephalopathy: It could be due to pneumonia versus cellulitis.  She is not hypoxemic as such.  She is being treated with antibiotics for both.  Continue to monitor mental status closely.    Right leg cellulitis: No signs of abscess.  No need for vancomycin.  Will treat with Rocephin.  Blood cultures have been sent.    Pneumonia: The patient has bilateral infiltrates on the chest x-ray.  This could be pulmonary edema but she does have leukocytosis and encephalopathy.  She will be treated with azithromycin and Rocephin.    Hypothyroidism: Continue levothyroxine.  Check TSH levels.    History of gout: Continue on allopurinol.        Diet: Combination Diet Regular Diet Adult  Snacks/Supplements Adult: Ensure Enlive; With Meals    DVT Prophylaxis: Enoxaparin (Lovenox) SQ  [place]ont Catheter: Not present  Lines: None     Cardiac Monitoring: None  Code Status: No CPR- Do NOT Intubate      Clinically Significant Risk Factors Present on Admission                # Drug Induced Platelet Defect: home medication list includes an antiplatelet medication   # Hypertension: Home medication list includes antihypertensive(s)       # Severe Malnutrition: based on nutrition assessment            Disposition Plan      Expected Discharge Date: 11/10/2023                Discussed with the son and daughter-in-law in detail.  They verbalized understanding.  Prognosis seems to be poor.  Wayne Figueredo MD  Hospitalist Service  [facility]  Securely message with Vocera (more info)  Text page via [employer] Access Software  Paging/Directory     ______________________________________________________________________    Chief Complaint   Unresponsive with altered mental status    History is obtained from the patient and patient's son    History of Present Illness   Skye Baez is a 89 year old female with history of hypertension, gout, hypothyroidism and mild dementia resident of nursing home was taken to the ED after she was found unresponsive.  The patient cannot give any history.  The son is by the bedside with source of the history.  According to the son they went to see the patient couple of weeks ago and she was doing okay.  She is usually wheelchair-bound or she uses a walker.  For the last couple of days she has been getting more sluggish.  This morning it was hard to arouse her.  The family does not want any aggressive measures but would like to treat her with antibiotics.  The patient was found to have right leg pain and redness suggestive of cellulitis.  She has had multiple episodes of UTIs in the past.  No reports of cough or shortness of breath.      Past Medical History    Hypertension  Gout  Hypothyroidism      Past Surgical History   No past surgical history on file.    Prior to Admission Medications   Prior to Admission Medications   Prescriptions Last Dose Informant Patient Reported? Taking?   Cholecalciferol (VITAMIN D3) 1.25 MG (86443 UT) TABS 11/7/2023 at AM Nursing Home Yes Yes   Sig: TAKE 1 CAPSULE (125MCG) BY MOUTH EVERY DAY   allopurinol (ZYLOPRIM) 100 MG tablet 11/7/2023 at AM Nursing Home Yes Yes   Sig: Take 200 mg by mouth daily   aspirin 81 MG EC tablet 11/7/2023 at AM Nursing Home Yes Yes   Sig: Take 81 mg by mouth daily   furosemide (LASIX) 20 MG tablet 11/7/2023 at AM Nursing Home Yes Yes   Sig: Take 20 mg by mouth daily   gabapentin (NEURONTIN) 100 MG capsule 11/7/2023 at 1930 Nursing Home No Yes   Sig: Take 2 capsules (200 mg) by mouth nightly as needed for neuropathic pain   gabapentin  (NEURONTIN) 100 MG capsule   No No   Sig: Take 1 capsule (100 mg) by mouth 2 times daily for 3 days   levothyroxine (SYNTHROID/LEVOTHROID) 125 MCG tablet 11/8/2023 at AM Nursing Home Yes Yes   Sig: Take 125 mcg by mouth daily   lisinopril (ZESTRIL) 20 MG tablet 11/7/2023 at AM Nursing Home Yes Yes   Sig: Take 20 mg by mouth daily   metoprolol succinate ER (TOPROL-XL) 25 MG 24 hr tablet 11/7/2023 at AM Nursing Home Yes Yes   Sig: Take 25 mg by mouth daily   pantoprazole (PROTONIX) 40 MG EC tablet 11/8/2023 at AM Nursing Home Yes Yes   Sig: Take 40 mg by mouth every morning      Facility-Administered Medications: None        Review of Systems    The 5 point Review of Systems is negative other than noted in the HPI or here.  Review of systems is unobtainable otherwise.    Social History   I have reviewed this patient's social history and updated it with pertinent information if needed.  Social History     Tobacco Use    Smoking status: Never     Passive exposure: Past    Smokeless tobacco: Never   Vaping Use    Vaping Use: Never used         Family History     Unable to obtain due to: Altered mental status      Allergies   Allergies   Allergen Reactions    Iodine Unknown    Celecoxib Hives    Codeine Hives    Penicillins Hives        Physical Exam   Vital Signs: Temp: 97.9  F (36.6  C) Temp src: Tympanic BP: 133/50 Pulse: 58   Resp: 18 SpO2: 98 % O2 Device: None (Room air)    Weight: 133 lbs 0 oz    GENERAL APPEARANCE: The patient is rather lethargic and does not want to talk too much.  HEENT: No oropharyngeal lesions.  NECK: Supple.   CHEST: Symmetric. Nontender to palpation.  LUNGS: B/l CTA  HEART: S1+S2 [place]  ABDOMEN: Soft, flat, and benign. BS +ve  EXTREMITIES: Right leg edema, erythema and calor, no abscess.  NEUROLOGIC: Moving all extremities   PSYCHIATRIC: Rather lethargic  SKIN: Right leg erythema       Medical Decision Making       75 MINUTES SPENT BY ME on the date of service doing chart review, history,  exam, documentation & further activities per the note.      Data     I have personally reviewed the following data over the past 24 hrs:    15.3 (H)  \   9.8 (L)   / 157     138 99 21.5 /  95   3.7 30 (H) 1.37 (H) \     Procal: 0.84 (H) CRP: N/A Lactic Acid: 1.1         Imaging results reviewed over the past 24 hrs:   Recent Results (from the past 24 hour(s))   XR Chest Port 1 View    Narrative    PROCEDURE:  XR CHEST PORT 1 VIEW    HISTORY: fatigue    COMPARISON:  Radiograph 7/30/2022    FINDINGS: Single view chest radiograph    Cardiomediastinal silhouette is enlarged, stable. There is calcific  aortic atherosclerosis.  Perihilar and peripheral interstitial opacities. Bibasilar groundglass  opacities. No effusion or pneumothorax.    No suspicious osseous lesion or subdiaphragmatic free air.      Impression    IMPRESSION:   Changes in the chest which are likely due to heart failure, including  cardiomegaly and bilateral pulmonary opacities. Pneumonia cannot be  excluded.    NEMO WINSTON MD         SYSTEM ID:  [id number]

## 2023-11-08 NOTE — PROGRESS NOTES
Clinical Nutrition / Initial Assessment     Reason for Assessment:  Screened at nutritional risk due to:  Recent weight loss without trying    Assessment:   Client History:  pt admitted with increased fatigue. She lives at [facility] care facility. She has had a significant wt loss in past year and overall decline. Noted some memory issues as well with poor appetite. Family considering hospice/comfort cares.   Diet Order:  regular   Oral Intake:  monitor   Supplement Intake:  will add Ensure TID to trays.   Weight:   Wt Readings from Last 10 Encounters:   11/08/23 60.3 kg (133 lb)   07/20/23 62.6 kg (138 lb)   07/12/23 62.3 kg (137 lb 6.4 oz)   07/12/23 62.3 kg (137 lb 6.4 oz)   07/30/22 80.3 kg (177 lb)   07/23/22 80.3 kg (177 lb 1.6 oz)   06/10/21 89.8 kg (198 lb)      Malnutrition Criteria:  (Need to have 2 indicators to qualify recommendation)  Energy Intake:  Chronic Moderate: < 75% of estimated energy requirement for >/= 1 month  Interpretation of Weight Loss:  Chronic Severe:   >20% in 1 year   Physical Findings:  Chronic Body Fat Loss:  mild to moderate and Chronic Muscle Mass Loss:  mild to moderate  Reduced  Strength:  Not Measured but likely reduced with current illness, overall decline and weakness    Recommended Nutrition Diagnosis:   Severe Malnutrition in the context of chronic illness - based on AND/ASPEN Clinical Characterstics of Malnutrition May 2012  Malnutrition:    - Level of malnutrition: Severe       Nutrition Education: Nutrition education will be provided as appropriate.    Nutrition Diagnosis: Weight:   weight loss related to inadequate energy intakes as evidenced by ~20% wt loss in past year, weakness, poor appetite.    Intervention:  Nutrition Prescription:     Nutrition Intervention(s):Recommended general, healthful diet  Meals and Snacks: small/frequent meals/snacks   2. Medical Food Supplement: Ensure TID on trays     Nutrition Goal(s):  1. Pt will tolerate diet as ordered  2.  Pt will consume 50% or more of meals and supplements  3. Pt will not have unplanned wt loss during hospitalization     Monitoring and Evaluation:   Food Intake, diet tolerance, weights     Discharge Recommendation:   Nutrition Discharge Planning  Recommend supplements on discharge if intakes are less than 50%    RD will reassess in within 1-5 days or sooner.  Yeimy Sy RD on 11/8/2023 at 2:36 PM

## 2023-11-08 NOTE — ED PROVIDER NOTES
History     Chief Complaint   Patient presents with    Fatigue     HPI  MaliniTheresa Baez is a 89 year old female who presents from her care facility for increasing fatigue.  Of significance have seen her previously and diagnosed her with gout.  Has been having ongoing issues with this.  She is generally followed by her regular doctor.  She is here with her son and daughter-in-law.  Extensive history is provided by them.  Essentially she is lost about 70 pounds since the beginning of the year.  In fact, daughter had to buy her a whole new wardrobe because of all of her weight loss.      She appears to be having more memory concerns and issues.  She is lost the ability to interact much with her friends at her care facility.  Her  is also suffering from dementia and sleeps approximately 20 hours/day.  She has noticed issues with eating and they have to help cut up her food.  Her appetite is poor.  She has had questionable recurrent UTIs.  Per care facility they have not noted a fever.  In fact, they did not report any leg redness but I noticed on exam she had right lower leg swelling which is new for her when I discussed this with the daughter.  Unclear if any new cough chest pain or shortness of breath but not obviously so.  Has bilateral leg swelling which has been chronic and unchanged.    He would like a flu shot while she is here if able.  We had discussion about her comorbidities and prognosis.  We discussed hospice.  They will consider.  At this time would like to admit her at least for IV antibiotic therapy to see if she improves.    Resolved cannot every any history she was sleeping throughout the exam.    Allergies:  Allergies   Allergen Reactions    Celecoxib Hives    Codeine     Iodine     Penicillins        Problem List:    Patient Active Problem List    Diagnosis Date Noted    Cellulitis of left lower extremity 11/08/2023     Priority: Medium    Pain of toe of right foot 07/12/2023      Priority: Medium        Past Medical History:    No past medical history on file.    Past Surgical History:    No past surgical history on file.    Family History:    No family history on file.    Social History:  Marital Status:   [2]  Social History     Tobacco Use    Smoking status: Never     Passive exposure: Past    Smokeless tobacco: Never   Vaping Use    Vaping Use: Never used        Medications:    aspirin 81 MG EC tablet  Cholecalciferol (VITAMIN D3) 1.25 MG (12873 UT) TABS  furosemide (LASIX) 20 MG tablet  gabapentin (NEURONTIN) 100 MG capsule  gabapentin (NEURONTIN) 100 MG capsule  hydrochlorothiazide (HYDRODIURIL) 25 MG tablet  LEVOTHYROXINE SODIUM PO  lisinopril (ZESTRIL) 20 MG tablet  metoprolol succinate ER (TOPROL-XL) 25 MG 24 hr tablet  mupirocin (BACTROBAN) 2 % external ointment  pantoprazole (PROTONIX) 40 MG EC tablet  sulfamethoxazole-trimethoprim (BACTRIM DS) 800-160 MG tablet  Vitamin D3 (CHOLECALCIFEROL) 25 mcg (1000 units) tablet          Review of Systems   Unable to perform ROS: Dementia       Physical Exam   BP: 122/53  Pulse: 74  Temp: 97.7  F (36.5  C)  Resp: 18  Weight: 60.3 kg (133 lb)  SpO2: 95 %      Physical Exam  Constitutional:       Comments: Sitting comfortably in bed.  Will wake up and open her eyes when family is in room.  Unable to follow commands due to weakness.   HENT:      Head: Normocephalic and atraumatic.      Mouth/Throat:      Mouth: Mucous membranes are moist.   Eyes:      Pupils: Pupils are equal, round, and reactive to light.   Cardiovascular:      Rate and Rhythm: Normal rate and regular rhythm.      Pulses: Normal pulses.   Pulmonary:      Comments: Poor inspiratory effort.  No obvious wheezing rhonchi or rales.  Skin:     General: Skin is warm.      Comments: Is erythema of the right lower extremity anterior wrapping around laterally.  There is good distal pulses.  Mild edema right greater than left.         ED Course              ED Course as of  11/08/23 1229   Wed Nov 08, 2023   1109 Not overwhelming for UTI but she is having fatigue and no other known source of infection.  We will give a dose of Rocephin at this time.   1138 CXR showed HF vs pneumonia   1150 Leg red. Concern for possible cellulitis. Ongoign declining mental status and memory issues this year. Has not been eating much. +weight loss     Procedures              Critical Care time:  none               Results for orders placed or performed during the hospital encounter of 11/08/23 (from the past 24 hour(s))   Asymptomatic Influenza A/B, RSV, & SARS-CoV2 PCR (COVID-19) Nose    Specimen: Nose; Swab   Result Value Ref Range    Influenza A PCR Negative Negative    Influenza B PCR Negative Negative    RSV PCR Negative Negative    SARS CoV2 PCR Negative Negative    Narrative    Testing was performed using the Xpert Xpress CoV2/Flu/RSV Assay on the [employer] GeneXpert Instrument. This test should be ordered for the detection of SARS-CoV-2, influenza, and RSV viruses in individuals who meet clinical and/or epidemiological criteria. Test performance is unknown in asymptomatic patients. This test is for in vitro diagnostic use under the FDA EUA for laboratories certified under CLIA to perform high or moderate complexity testing. This test has not been FDA cleared or approved. A negative result does not rule out the presence of PCR inhibitors in the specimen or target RNA in concentration below the limit of detection for the assay. If only one viral target is positive but coinfection with multiple targets is suspected, the sample should be re-tested with another FDA cleared, approved, or authorized test, if coinfection would change clinical management. This test was validated by the [facility]. These laboratories are certified under the Clinical Laboratory Improvement Amendments of 1988 (CLIA-88) as qualified to perform high complexity laboratory testing.   CBC with platelets  differential    Narrative    The following orders were created for panel order CBC with platelets differential.  Procedure                               Abnormality         Status                     ---------                               -----------         ------                     CBC with platelets and d...[[id number]]  Abnormal            Final result                 Please view results for these tests on the individual orders.   Basic metabolic panel   Result Value Ref Range    Sodium 138 135 - 145 mmol/L    Potassium 3.7 3.4 - 5.3 mmol/L    Chloride 99 98 - 107 mmol/L    Carbon Dioxide (CO2) 30 (H) 22 - 29 mmol/L    Anion Gap 9 7 - 15 mmol/L    Urea Nitrogen 21.5 8.0 - 23.0 mg/dL    Creatinine 1.37 (H) 0.51 - 0.95 mg/dL    GFR Estimate 35 (L) >60 mL/min/1.73m2    Calcium 9.1 8.2 - 9.6 mg/dL    Glucose 95 70 - 99 mg/dL   CBC with platelets and differential   Result Value Ref Range    WBC Count 15.3 (H) 4.0 - 11.0 10e3/uL    RBC Count 3.01 (L) 3.80 - 5.20 10e6/uL    Hemoglobin 9.8 (L) 11.7 - 15.7 g/dL    Hematocrit 29.6 (L) 35.0 - 47.0 %    MCV 98 78 - 100 fL    MCH 32.6 26.5 - 33.0 pg    MCHC 33.1 31.5 - 36.5 g/dL    RDW 16.7 (H) 10.0 - 15.0 %    Platelet Count 157 150 - 450 10e3/uL    % Neutrophils 87 %    % Lymphocytes 8 %    % Monocytes 4 %    % Eosinophils 0 %    % Basophils 0 %    % Immature Granulocytes 1 %    NRBCs per 100 WBC 0 <1 /100    Absolute Neutrophils 13.2 (H) 1.6 - 8.3 10e3/uL    Absolute Lymphocytes 1.2 0.8 - 5.3 10e3/uL    Absolute Monocytes 0.7 0.0 - 1.3 10e3/uL    Absolute Eosinophils 0.1 0.0 - 0.7 10e3/uL    Absolute Basophils 0.0 0.0 - 0.2 10e3/uL    Absolute Immature Granulocytes 0.1 <=0.4 10e3/uL    Absolute NRBCs 0.0 10e3/uL   Extra Tube    Narrative    The following orders were created for panel order Extra Tube.  Procedure                               Abnormality         Status                     ---------                               -----------         ------                      Extra Blue Top Tube[[id number]]                              Final result               Extra Red Top Tube[[id number]]                               Final result               Extra Green Top (Lithium...[[id number]]                      Final result                 Please view results for these tests on the individual orders.   Extra Blue Top Tube   Result Value Ref Range    Hold Specimen JIC    Extra Red Top Tube   Result Value Ref Range    Hold Specimen hold    Extra Green Top (Lithium Heparin) ON ICE   Result Value Ref Range    Hold Specimen JIC    Lactic acid whole blood   Result Value Ref Range    Lactic Acid 1.1 0.7 - 2.0 mmol/L   Procalcitonin   Result Value Ref Range    Procalcitonin 0.84 (H) <0.05 ng/mL   Uric acid   Result Value Ref Range    Uric Acid 3.8 2.4 - 5.7 mg/dL   UA with Microscopic reflex to Culture    Specimen: Urine, Catheter   Result Value Ref Range    Color Urine Yellow Colorless, Straw, Light Yellow, Yellow    Appearance Urine Slightly Cloudy (A) Clear    Glucose Urine Negative Negative mg/dL    Bilirubin Urine Negative Negative    Ketones Urine Negative Negative mg/dL    Specific Gravity Urine 1.017 1.000 - 1.030    Blood Urine Negative Negative    pH Urine 5.0 5.0 - 9.0    Protein Albumin Urine 10 (A) Negative mg/dL    Urobilinogen Urine 3.0 (A) Normal, 2.0 mg/dL    Nitrite Urine Negative Negative    Leukocyte Esterase Urine Moderate (A) Negative    Bacteria Urine Many (A) None Seen /HPF    Mucus Urine Present (A) None Seen /LPF    RBC Urine 0 <=2 /HPF    WBC Urine 5 <=5 /HPF    Hyaline Casts Urine 8 (H) <=2 /LPF    Narrative    Urine Culture ordered based on laboratory criteria   XR Chest Port 1 View    Narrative    PROCEDURE:  XR CHEST PORT 1 VIEW    HISTORY: fatigue    COMPARISON:  Radiograph 7/30/2022    FINDINGS: Single view chest radiograph    Cardiomediastinal silhouette is enlarged, stable. There is calcific  aortic atherosclerosis.  Perihilar and peripheral interstitial opacities.  Bibasilar groundglass  opacities. No effusion or pneumothorax.    No suspicious osseous lesion or subdiaphragmatic free air.      Impression    IMPRESSION:   Changes in the chest which are likely due to heart failure, including  cardiomegaly and bilateral pulmonary opacities. Pneumonia cannot be  excluded.    NEMO WINSTON MD         SYSTEM ID:  [id number]   Blood gas arterial and oxyhgb   Result Value Ref Range    pH Arterial 7.45 7.35 - 7.45    pCO2 Arterial 46 (H) 35 - 45 mm Hg    pO2 Arterial 72 (L) 80 - 105 mm Hg    Bicarbonate Arterial 32 (H) 21 - 28 mmol/L    Oxyhemoglobin Arterial 94 92 - 100 %    Base Excess/Deficit 6.7 (H) -9.0 - 1.8 mmol/L    FIO2 21     Wilfredo's Test No        Medications   sodium chloride 0.9% BOLUS 500 mL (500 mLs Intravenous $New Bag 11/8/23 6031)   cefTRIAXone (ROCEPHIN) 1 g vial to attach to  mL bag for ADULTS or NS 50 mL bag for PEDS (1 g Intravenous $New Bag 11/8/23 9349)       Assessments & Plan (with Medical Decision Making)     I have reviewed the nursing notes.    I have reviewed the findings, diagnosis, plan and need for follow up with the patient.           Medical Decision Making  The patient's presentation was of low complexity (an acute and uncomplicated illness or injury).    The patient's evaluation involved:  ordering and/or review of 3+ test(s) in this encounter (see separate area of note for details)    The patient's management necessitated high risk (a decision regarding hospitalization).        New Prescriptions    No medications on file       Final diagnoses:   Cellulitis of right leg   Overall comorbidities and recent illnesses.  I think her prognosis is generally poor.  Discussed this at length with her family.  They will consider hospice.  However, they would like to at least try treating the infection.  Initially thought she may have a urinary tract infection based on prior history and positive UA.  However on exam she had a right lower extremity cellulitis  and I think this is more likely the cause of her infection.  As such I recommend admission to the hospital for further antibiotic therapy and close monitoring.  She has a history of heart failure and on her x-ray.  Slightly volume overloaded but was mildly hypotensive so I gave her a 500 cc bolus.  Will need close monitoring of volume status as well.  Unclear if there is underlying comorbidity other than dementia that may also be contributing to her weight loss such as swallowing issues.  Unclear whether or not this would warrant further work-up given her ongoing comorbidities.  Family was very patient understanding and appreciative of care.  All questions answered.  Spoke with the hospitalist will admit patient and continue treatment at this time.    11/8/2023   [facility] AND \[facility] [place] [facility]\""       Jane Marcus DO  11/08/23 1237

## 2023-11-08 NOTE — PHARMACY-ADMISSION MEDICATION HISTORY
Pharmacist Admission Medication History    Admission medication history is complete. The information provided in this note is only as accurate as the sources available at the time of the update.    Information Source(s): Facility ([facility]/) medication list/MAR via  MAR, sure scripts    Pertinent Information: Patient only received her levothyroxine and pantoprazole this AM    Changes made to PTA medication list:  Added: allopurinol  Deleted: hydrochlorothiazide, Bactrim DS, vitamin D 1000 units, mupirocin ointment  Changed: levothyroxine dose from 137 to 125 mcg    Medication Affordability: not assessed - resides in [facility]     Allergies reviewed with patient and updates made in EHR:  per MAR from facility    Medication History Completed By: Lashell Stapleton [facility] 11/8/2023 1:19 PM    PTA Med List   Medication Sig Last Dose    allopurinol (ZYLOPRIM) 100 MG tablet Take 200 mg by mouth daily 11/7/2023 at AM    aspirin 81 MG EC tablet Take 81 mg by mouth daily 11/7/2023 at AM    Cholecalciferol (VITAMIN D3) 1.25 MG (21522 UT) TABS TAKE 1 CAPSULE (125MCG) BY MOUTH EVERY DAY 11/7/2023 at AM    furosemide (LASIX) 20 MG tablet Take 20 mg by mouth daily 11/7/2023 at AM    gabapentin (NEURONTIN) 100 MG capsule Take 2 capsules (200 mg) by mouth nightly as needed for neuropathic pain 11/7/2023 at 1930    levothyroxine (SYNTHROID/LEVOTHROID) 125 MCG tablet Take 125 mcg by mouth daily 11/8/2023 at AM    lisinopril (ZESTRIL) 20 MG tablet Take 20 mg by mouth daily 11/7/2023 at AM    metoprolol succinate ER (TOPROL-XL) 25 MG 24 hr tablet Take 25 mg by mouth daily 11/7/2023 at AM    pantoprazole (PROTONIX) 40 MG EC tablet Take 40 mg by mouth every morning 11/8/2023 at AM

## 2023-11-08 NOTE — ED TRIAGE NOTES
Pt arrives VIA [facility] from [facility] with complaints of fatigue that started this morning. Denies any other symptoms, afebrile.   /53   Pulse 74   Temp 97.7  F (36.5  C) (Tympanic)   Resp 18   Wt 60.3 kg (133 lb)   LMP  (LMP Unknown)   SpO2 95%   BMI 21.47 kg/m        Triage Assessment (Adult)       Row Name 11/08/23 0852          Triage Assessment    Airway WDL WDL        Respiratory WDL    Respiratory WDL WDL        Skin Circulation/Temperature WDL    Skin Circulation/Temperature WDL WDL        Cardiac WDL    Cardiac WDL WDL        Peripheral/Neurovascular WDL    Peripheral Neurovascular WDL WDL        Cognitive/Neuro/Behavioral WDL    Cognitive/Neuro/Behavioral WDL WDL

## 2023-11-08 NOTE — PHARMACY-VANCOMYCIN DOSING SERVICE
Pharmacy Vancomycin Initial Note  Date of Service 2023  Patient's  7/3/1927  89 year old, female    Indication: Skin and Soft Tissue Infection    Current estimated CrCl = Estimated Creatinine Clearance: 22.9 mL/min (A) (based on SCr of 1.37 mg/dL (H)).    Creatinine for last 3 days  2023:  9:56 AM Creatinine 1.37 mg/dL    Recent Vancomycin Level(s) for last 3 days  No results found for requested labs within last 3 days.      Vancomycin IV Administrations (past 72 hours)        No vancomycin orders with administrations in past 72 hours.                    Nephrotoxins and other renal medications (From now, onward)      Start     Dose/Rate Route Frequency Ordered Stop    23 1400  vancomycin (VANCOCIN) 500 mg vial to attach to  mL bag         500 mg  over 1 Hours Intravenous EVERY 24 HOURS 23 1434      23 1000  lisinopril (ZESTRIL) tablet 20 mg        Note to Pharmacy: PTA Sig:Take 20 mg by mouth daily      20 mg Oral DAILY 23 1348      23 1500  vancomycin (VANCOCIN) 1,250 mg in 0.9% NaCl 250 mL intermittent infusion         1,250 mg  over 90 Minutes Intravenous ONCE 23 1429      23 1400  furosemide (LASIX) tablet 20 mg        Note to Pharmacy: PTA Sig:Take 20 mg by mouth daily      20 mg Oral DAILY 23 1348              Contrast Orders - past 72 hours (72h ago, onward)      None            [employer] Prediction of Planned Initial Vancomycin Regimen  Loading dose: 1250 mg at 15:00 2023.  Regimen: 500 mg IV every 24 hours.  Start time: 14:37 on 2023  Exposure target: AUC24 (range)400-600 mg/L.hr   AUC24,ss: 406 mg/L.hr  Probability of AUC24 > 400: 52 %  Ctrough,ss: 14 mg/L  Probability of Ctrough,ss > 20: 16 %  Probability of nephrotoxicity (Lodise ADRI ): 9 %          Plan:  Start vancomycin  1250 mg IV once Followed by 500mg IV Q24hr.   Vancomycin monitoring method: AUC  Vancomycin therapeutic monitoring goal: 400-600  mg*h/L  Pharmacy will check vancomycin levels as appropriate in 1-3 Days.    Serum creatinine levels will be ordered daily for the first week of therapy and at least twice weekly for subsequent weeks.      Hitesh Landry RPH

## 2023-11-09 NOTE — PROGRESS NOTES
Interdisciplinary Discharge Planning Note    Anticipated Discharge Date: 11/10-11/11    Anticipated Discharge Location: [facility]    Clinical Needs Before Discharge:   IV ABX    Treatment Needs After Discharge:  None identified    Potential Barriers to Discharge: Patient will discharge back to AL facility.     ALBAN Sanchez  11/9/2023,  12:21 PM

## 2023-11-09 NOTE — PLAN OF CARE
Goal Outcome Evaluation: Comes from ER, patient is a 89 year old female with history of hypertension, gout, hypothyroidism and mild dementia, resident of nursing home was taken to the ED after she was found unresponsive. The family does not want any aggressive measures but would like to treat her with antibiotics. Patient presents with right leg pain and redness suggestive of cellulitis.  She has had multiple episodes of UTIs in the past.  No reports of cough or shortness of breath. Currently resting at this time.      Problem: Adult Inpatient Plan of Care  Goal: Optimal Comfort and Wellbeing  Outcome: Progressing  Intervention: Monitor Pain and Promote Comfort  Recent Flowsheet Documentation  Taken 11/8/2023 1500 by Ernesto Dorsey RN  Pain Management Interventions: medication (see MAR)  Taken 11/8/2023 1330 by Ernesto Dorsey RN  Pain Management Interventions: medication (see MAR)     Problem: Pain Acute  Goal: Optimal Pain Control and Function  Outcome: Progressing  Intervention: Develop Pain Management Plan  Recent Flowsheet Documentation  Taken 11/8/2023 1500 by Ernesto Dorsey RN  Pain Management Interventions: medication (see MAR)  Taken 11/8/2023 1330 by Ernesto Dorsey RN  Pain Management Interventions: medication (see MAR)     Problem: UTI (Urinary Tract Infection)  Goal: Improved Infection Symptoms  Outcome: Progressing     Problem: Adult Inpatient Plan of Care  Goal: Absence of Hospital-Acquired Illness or Injury  Intervention: Identify and Manage Fall Risk  Recent Flowsheet Documentation  Taken 11/8/2023 1600 by Ernesto Dorsey RN  Safety Promotion/Fall Prevention:   increased rounding and observation   safety round/check completed  Taken 11/8/2023 1400 by Ernesto Dorsey RN  Safety Promotion/Fall Prevention:   increased rounding and observation   safety round/check completed  Intervention: Prevent and Manage VTE (Venous Thromboembolism) Risk  Recent Flowsheet Documentation  Taken 11/8/2023 1600 by Sunita  Ernesto, RN  VTE Prevention/Management: SCDs (sequential compression devices) off  Taken 11/8/2023 1400 by Ernesto Dorsey RN  VTE Prevention/Management: SCDs (sequential compression devices) off  Intervention: Prevent Infection  Recent Flowsheet Documentation  Taken 11/8/2023 1600 by Ernesto Dorsey, RN  Infection Prevention: rest/sleep promoted  Taken 11/8/2023 1400 by Ernesto Dorsey RN  Infection Prevention: rest/sleep promoted  Goal: Readiness for Transition of Care  Intervention: Mutually Develop Transition Plan  Recent Flowsheet Documentation  Taken 11/8/2023 1300 by Ernesto Dorsey, RN  Equipment Currently Used at Home: walker, rolling

## 2023-11-09 NOTE — PROGRESS NOTES
Spoke with Ney regarding pt critical lab value @ 1546 pt urine tested positive for S. Pneumoniae antigen will report to pt nurse

## 2023-11-09 NOTE — PROGRESS NOTES
Patient had external catheter and reported she felt the urge to void. Patient was educated on external catheter but reported she couldn't push anything out. Patient was repositioned to help facilitate voiding with no success. Bladder scanner revealed a minimum of 545mL in bladder. Received straight catheter order. With straight catheterization, patient put out 600mLs of jorge luis colored urine.

## 2023-11-09 NOTE — PROGRESS NOTES
11/08/23 1920 11/08/23 2141 11/08/23 2219   Vital Signs   BP (!) 87/41 (!) 83/38 101/40   BP Location Left arm Left arm Left arm   Patient Position Semi-Pierson's Semi-Pierson's Semi-Pierson's   Cuff Size Adult Regular  --  Adult Regular     Teledoc contacted regarding patient's BP. Teledoc ordered 75ml/hr LR as well as a 500mL bolus. BP is still soft but has improved.

## 2023-11-09 NOTE — PROGRESS NOTES
:    Patient comes from [facility]. Per discharge planning patient is anticipated to discharge to AL facility in 1-2 days.      will continue to follow for discharge planning needs.     ALBAN Sanchez on 11/9/2023 at 12:20 PM    Called and updated staff at [facility] on patient's discharge plan. Staff stated that patient's family usually transports patient.      ALBAN Sanchez on 11/9/2023 at 1:00 PM

## 2023-11-09 NOTE — PROGRESS NOTES
11/09/23 0530   Vital Signs   /44   BP Location Left arm   Patient Position Semi-Pierson's   Cuff Size Adult Regular     Due to BP concerns this shift, writer contacted teledoc regarding 40mg IVP dose. Teledoc responded to hold dose for now.

## 2023-11-09 NOTE — PROGRESS NOTES
Assumed patient care. Pt is resting comfortably with baby in crib. Pt rates pain 4/10 and has been medicated. Patient assessment completed. Discussed infant bulb suction and emergency call light. POC reviewed with patient all questions answered. Will continue to monitor, call light in reach.     [facility]    Medicine Progress Note - Hospitalist Service    Date of Admission:  11/8/2023    Assessment & Plan   Skye Baez is a 89 year old female admitted on 11/8/2023. She has history of hypertension, gout, hypothyroidism and mild dementia resident of nursing home was taken to the ED after she was found unresponsive.  She was admitted with acute toxic metabolic encephalopathy most probably secondary to cellulitis versus pneumonia.  She has the following acute medical issues:    Acute toxic metabolic encephalopathy: It could be due to pneumonia versus cellulitis versus UTI.  She is not hypoxemic as such.  She is being treated with antibiotics for both.  Continue to monitor mental status closely.  UA was relatively unremarkable but urine cultures are growing more than 100,000 colonies of gram-negative rods.  The patient is on Rocephin, that should cover it.  Leukocytosis has resolved with current antibiotic therapy.    Right leg cellulitis: No signs of abscess.  No need for vancomycin.  Being treated with Rocephin.  Blood cultures have been sent.    Pneumonia versus CHF: The patient has bilateral infiltrates on the chest x-ray.  This could be pulmonary edema but she does have leukocytosis and encephalopathy.  She is being treated with azithromycin and Rocephin.    Acute on chronic HFpEF: The patient's proBNP is more than 6000 and has probable pulmonary congestion on the chest x-ray.  Continue on Lasix for now.  This is helping.  Echocardiogram report reviewed.  Left ventricular ejection fraction is 60 to 65% but does have diastolic dysfunction.  Monitor intake and output with daily weights.    ?UTI: UA was relatively unimpressive but urine cultures are positive for more than 100,000 colonies of gram-negative bacilli.  Rocephin should cover that.    Urinary retention: The patient has not been able to void.  We had to straight catheter.  I will place Dumont catheter for 24 hours  to decompress.    Hypothyroidism: Continue levothyroxine.  TSH is 0.11 and free T4 is 2.42.  I will reduce the dose of levothyroxine to 100 mcg daily.    Anemia: The patient's hemoglobin was 9.8 and today it is 8.4.  This most probably secondary to fluid balance.  Continue to monitor for now.  Check anemia labs.    History of gout: Continue on allopurinol.      Diet: Combination Diet Regular Diet Adult  Snacks/Supplements Adult: Ensure Enlive; With Meals    DVT Prophylaxis: Enoxaparin (Lovenox) SQ  Dumont Catheter: Not present  Lines: None     Cardiac Monitoring: None  Code Status: No CPR- Do NOT Intubate      Clinically Significant Risk Factors                          # Severe Malnutrition: based on nutrition assessment, PRESENT ON ADMISSION          Disposition Plan     Expected Discharge Date: 11/10/2023                Discussed with the patient, the son and daughter-in-law in detail.     Wayne Figueredo MD  Hospitalist Service  [facility]  Securely message with [employer] info)  Text page via [facility] Paging/Directory   ______________________________________________________________________    Interval History   The patient was seen and examined.  Overnight events reviewed.  Discussed with nursing staff.  She is feeling much better today.  Her mental status has improved remarkably.  She is feeling hungry this morning.  She has had urinary retention for which she had to be straight cathed.  She also had low-grade fevers.  Denies any shortness of breath or cough.  Denies any nausea or vomiting.  Denies chest pain abdominal pain.    Physical Exam   Vital Signs: Temp: 99.6  F (37.6  C) Temp src: Tympanic BP: 107/43 Pulse: 80   Resp: 16 SpO2: 96 % O2 Device: None (Room air)    Weight: 138 lbs 11.2 oz    GENERAL APPEARANCE: In no acute distress.  Alert and oriented today  HEENT: No oropharyngeal lesions.  NECK: Supple.   CHEST: Symmetric. Nontender to palpation.  LUNGS: Bilateral equal air entry with  bibasilar crackles, no wheezing  HEART: S1+S2 [place]  ABDOMEN: Soft, flat, and benign. BS +ve  EXTREMITIES: Right leg erythema with calor and mild tenderness, pulses are bilateral equally palpable, no clubbing or cyanosis. Edema +  NEUROLOGIC: Grossly nonfocal examination  PSYCHIATRIC: Appropriate mood and affect.  SKIN: No new rashes.      Current Facility-Administered Medications   Medication    acetaminophen (TYLENOL) tablet 650 mg    Or    acetaminophen (TYLENOL) Suppository 650 mg    aspirin EC tablet 81 mg    azithromycin (ZITHROMAX) 250 mg in  mL intermittent infusion    cefTRIAXone (ROCEPHIN) 2 g vial to attach to  ml bag for ADULTS or NS 50 ml bag for PEDS    enoxaparin ANTICOAGULANT (LOVENOX) injection 30 mg    furosemide (LASIX) injection 40 mg    gabapentin (NEURONTIN) capsule 200 mg    [START ON 11/10/2023] influenza vac high-dose quad (FLUZONE HD) injection SALAS 0.7 mL    levothyroxine (SYNTHROID/LEVOTHROID) tablet 125 mcg    lisinopril (ZESTRIL) tablet 20 mg    metoprolol succinate ER (TOPROL XL) 24 hr tablet 25 mg    naloxone (NARCAN) injection 0.2 mg    Or    naloxone (NARCAN) injection 0.4 mg    Or    naloxone (NARCAN) injection 0.2 mg    Or    naloxone (NARCAN) injection 0.4 mg    ondansetron (ZOFRAN ODT) ODT tab 4 mg    Or    ondansetron (ZOFRAN) injection 4 mg    oxyCODONE IR (ROXICODONE) half-tab 2.5 mg    pantoprazole (PROTONIX) EC tablet 40 mg    polyethylene glycol (MIRALAX) Packet 17 g    traZODone (DESYREL) half-tab 25 mg        Medical Decision Making     35 MINUTES SPENT BY ME on the date of service doing chart review, history, exam, documentation & further activities per the note.      Data     I have personally reviewed the following data over the past 24 hrs:    10.7  \   8.4 (L)   / 134 (L)     136 101 20.2 /  113 (H)   3.9 26 1.25 (H) \     Trop: N/A BNP: N/A     TSH: N/A T4: N/A A1C: N/A       Imaging results reviewed over the past 24 hrs:   Recent Results (from the past  24 hour(s))   Echocardiogram Complete   Result Value    LVEF  60-65%    [facility]    [id number]  [id number]  [id number]  [id number]^ROGELIO^JOSE     [facility]  [address] [place] [address]     Name: KRYSTA IRVIN  MRN: [id number]  : 1927  Study Date: 2023 08:53 AM  Age: 89 yrs  Gender: Female  Patient Location: [facility]  Reason For Study: CHF  Ordering Physician: JOSE MERCADO  Performed By: Jade Garcia, ALEX, RDCS, RVT     BSA: 1.7 m2  Height: 66 in  Weight: 138 lb  HR: 81  BP: 107/43 mmHg  ______________________________________________________________________________  Procedure  Complete Portable Echo Adult.  ______________________________________________________________________________  Interpretation Summary  Global and regional left ventricular function is normal with an EF of 60-65%.  Grade III or advanced diastolic dysfunction.  Right ventricular function, chamber size, wall motion, and thickness are  normal.  Moderate tricuspid insufficiency.  Moderate pulmonary hypertension. Estimated pulmonary artery systolic pressure  is 58 mmHg.  The inferior vena cava is normal.  There is no prior study for direct comparison.  ______________________________________________________________________________  Left Ventricle  Global and regional left ventricular function is normal with an EF of 60-65%.  Left ventricular size is normal. Left ventricular wall thickness is normal.  Grade III or advanced diastolic dysfunction. No regional wall motion  abnormalities are seen.     Right Ventricle  Right ventricular function, chamber size, wall motion, and thickness are  normal.     Atria  Moderate biatrial enlargement is present.     Mitral Valve  Mild mitral insufficiency is present.     Aortic Valve  Aortic valve sclerosis is present.     Tricuspid Valve  Moderate tricuspid insufficiency is present. Estimated pulmonary artery  systolic pressure is 55 mmHg plus right atrial pressure.  Moderate pulmonary  hypertension is present.     Pulmonic Valve  The pulmonic valve is normal. Trace pulmonic insufficiency is present.     Vessels  Normal diameter aortic root and proximal ascending aorta. The inferior vena  cava is normal.     Pericardium  No pericardial effusion is present.     Compared to Previous Study  There is no prior study for direct comparison.     ______________________________________________________________________________  MMode/2D Measurements & Calculations  IVSd: 0.98 cm  LVIDd: 4.3 cm  LVIDs: 3.0 cm  LVPWd: 1.0 cm  FS: 29.3 %     LV mass(C)d: 139.4 grams  LV mass(C)dI: 81.6 grams/m2  Ao root diam: 3.0 cm  asc Aorta Diam: 3.3 cm  LVOT diam: 2.1 cm  LVOT area: 3.3 cm2  Ao root diam index Ht(cm/m): 1.8  Ao root diam index BSA (cm/m2): 1.7  Asc Ao diam index BSA (cm/m2): 1.9  Asc Ao diam index Ht(cm/m): 2.0  LA Volume (BP): 63.1 ml  LA Volume Index (BP): 36.9 ml/m2     RWT: 0.47  TAPSE: 2.0 cm     Doppler Measurements & Calculations  MV E max refugio: 120.0 cm/sec  MV A max refugio: 43.3 cm/sec  MV E/A: 2.8  MV dec time: 0.18 sec  Ao V2 max: 164.0 cm/sec  Ao max P.0 mmHg  Ao V2 mean: 115.0 cm/sec  Ao mean P.0 mmHg  Ao V2 VTI: 31.6 cm  SHAWNA(I,D): 1.6 cm2  SHAWNA(V,D): 1.8 cm2  LV V1 max PG: 3.1 mmHg  LV V1 max: 88.6 cm/sec  LV V1 VTI: 14.9 cm  SV(LVOT): 49.3 ml  SI(LVOT): 28.8 ml/m2  PA acc time: 0.07 sec  TR max refugio: 343.4 cm/sec  TR max P.3 mmHg  AV Refugio Ratio (DI): 0.54  SHAWNA Index (cm2/m2): 0.91  E/E' av.7  Lateral E/e': 14.7  Medial E/e': 14.7     RV S Refugio: 9.9 cm/sec     ______________________________________________________________________________  Report approved by: Anmol Hoover 2023 09:40 AM

## 2023-11-09 NOTE — PLAN OF CARE
"Patient is A&O, did not display any confusion. VSS except BP. See previous note regarding BPs. bolus. Patient also had external catheter in all night but reported she was unable to void. Patient was straight catheterized, see previous note. Bladder scanned again at approx 0630, 136mL at that time. Patient denied discomfort in bladder but stated \"it feels bubbly in there.\"      Problem: UTI (Urinary Tract Infection)  Goal: Improved Infection Symptoms  Outcome: Not Progressing     Problem: Adult Inpatient Plan of Care  Goal: Optimal Comfort and Wellbeing  Outcome: Progressing  Intervention: Monitor Pain and Promote Comfort  Recent Flowsheet Documentation  Taken 11/8/2023 1934 by Cassidy Lopez, RN  Pain Management Interventions: medication (see MAR)     Problem: Pain Acute  Goal: Optimal Pain Control and Function  Outcome: Progressing  Intervention: Develop Pain Management Plan  Recent Flowsheet Documentation  Taken 11/8/2023 1934 by Cassidy Lopez, RN  Pain Management Interventions: medication (see MAR)  Intervention: Prevent or Manage Pain  Recent Flowsheet Documentation  Taken 11/9/2023 0140 by Cassidy Lopez, RN  Medication Review/Management:   medications reviewed   high-risk medications identified  Taken 11/8/2023 1923 by Cassidy Lopez, RN  Medication Review/Management:   medications reviewed   high-risk medications identified   Goal Outcome Evaluation:                        "

## 2023-11-10 PROBLEM — R65.20 SEPSIS WITH ENCEPHALOPATHY WITHOUT SEPTIC SHOCK (H): Status: ACTIVE | Noted: 2023-01-01

## 2023-11-10 PROBLEM — G93.41 SEPSIS WITH ENCEPHALOPATHY WITHOUT SEPTIC SHOCK (H): Status: ACTIVE | Noted: 2023-01-01

## 2023-11-10 PROBLEM — L03.115 CELLULITIS OF RIGHT LOWER EXTREMITY: Status: ACTIVE | Noted: 2023-01-01

## 2023-11-10 PROBLEM — A41.9 SEPSIS WITH ENCEPHALOPATHY WITHOUT SEPTIC SHOCK (H): Status: ACTIVE | Noted: 2023-01-01

## 2023-11-10 NOTE — PLAN OF CARE
Goal Outcome Evaluation:      Plan of Care Reviewed With: patient    Overall Patient Progress: improving    Outcome Evaluation: VS stable, afebrile, monitor intake and output    Pt alert and oriented x 4. VS stable, afebrile. LS diminished. Pt receiving IV antibiotics. RLE redness improving and marked with skin marker. Plan is for patient to discharge back to [facility] today with Home Care orders.     Renea Neff RN .......  11/10/2023  4:27 PM

## 2023-11-10 NOTE — PLAN OF CARE
Discharge Note    Data:  Skye Baez discharged to nursing home at 1625 via wheel chair. Accompanied by son and daughter and staff.    Action:  Written discharge/follow-up instructions were provided to patient, son, and daughter. Prescriptions sent to patients preferred pharmacy. All belongings sent with patient.    Response:  Family members verbalized understanding of discharge instructions, reason for discharge, and necessary follow-up appointments. Called Didi from [facility] and gave her report. Gave her med/surg direct number in case of questions upon patient arrival.     Renea Neff RN .......  11/10/2023  4:32 PM

## 2023-11-10 NOTE — DISCHARGE SUMMARY
[facility]  Hospitalist Discharge Summary      Date of Admission:  11/8/2023  Date of Discharge:  11/10/2023  Discharging Provider: Cash Roman MD  Discharge Service: Hospitalist Service    Discharge Diagnoses   Sepsis with encephalopathy without septic shock (H)   Cellulitis of left lower extremity  UTI due to Klebsiella species  Streptococcus pneumoniae infection    Clinically Significant Risk Factors     # Severe Malnutrition: based on nutrition assessment      Follow-ups Needed After Discharge   Follow-up Appointments     Follow-up and recommended labs and tests       Follow up with primary care provider, Kristina Fields, within 7 days   for hospital follow- up.  The following labs/tests are recommended:   consider UA to ensure clearance of UTI.            Unresulted Labs Ordered in the Past 30 Days of this Admission       Date and Time Order Name Status Description    11/8/2023 12:16 PM Blood Culture Peripheral Blood Preliminary         These results will be followed up by hospitalist pool    Discharge Disposition   Discharged back to assisted living  Condition at discharge: Stable    Hospital Course   Skye Baez is a 89 year old female admitted on 11/8/2023. She has history of hypertension, gout, hypothyroidism and mild dementia.   She is a resident of [facility] assisted living and was taken to the ED after she was found unresponsive.  She was admitted with acute toxic metabolic encephalopathy most probably secondary to cellulitis versus pneumonia vs UTI.    Improved with ceftriaxone and azithromycin.  Urine culture returned positive for Klebsiella.  Urinary strep antigen positive.  She had erythema on her light lower extremity consistent with cellulitis that improved on antibiotics.  It is hard to say which infection potentially lead to unresponsiveness, but leg erythema was new and potentially the most likely etiology.   She is now more alert and  responsive. Worked with PT and OT and felt suitable to return to assisted living,but will benefit from home care.  She has the following acute medical issues:     Sepsis with encephalopathy without septic shock (H) :   Presenting unresponsive with white count over 15 and blood pressure down to 87/41 in the ED.  She improved with 500 mL fluid bolus.  Did not require further intensive fluid resuscitation or pressors.  Urine culture returned showing Klebsiella.  Bilateral infiltrates present on chest x-ray.  Positive urinary strep pneumonia antigen.  Right leg erythema consistent with cellulitis.   Is difficult to tell which infection was main etiology for sepsis.  She improved on ceftriaxone and azithromycin for 3 days with improved mentation and normal WBC.  Ceftin will cover Klebsiella and strep pneumonia.  She is discharged on Ceftin 500 mg twice daily for 5 more days.    Right leg cellulitis: No signs of abscess.  Blood culture negative. Erythema resolved with antibiotics    Strep pneumonia: The patient has bilateral infiltrates on the chest x-ray.  Improved on antibiotics    Klebsiella UTI: UA was relatively unimpressive but urine cultures are positive for more than 100,000 colonies of Klebsiella.     Acute on chronic HFpEF: The patient's proBNP is more than 6000 and has probable pulmonary congestion on the chest x-ray.  Diuresed with IV furosemide.  Echocardiogram showed left ventricular ejection fraction is 60 to 65% but does have diastolic dysfunction. Resume home furosemide on discharge.     Hypothyroidism: TSH is 0.11 and free T4 is 2.42.  Levothyroxine dose reduced from 125 to 100 mcg daily.     Anemia: The patient's hemoglobin was 9.8 down to 8.4 and then stable at 8.7 at time of discharge. Most likely from acute illness. Iron low, ferritin elevated (acute phase reactant). B12 normal. Folate normal.     Gout: Continue on allopurinol.    Consultations This Hospital Stay   SOCIAL WORK IP CONSULT  PHARMACY TO  DOSE VANCO  PHYSICAL THERAPY ADULT IP CONSULT  OCCUPATIONAL THERAPY ADULT IP CONSULT    Code Status   No CPR- Do NOT Intubate    Time Spent on this Encounter   I, Cash Roman MD, personally saw the patient today and spent greater than 30 minutes discharging this patient.                 Documentation of a Face-to-Face Physician Encounter November 10, 2023    Skye Baez  7/3/1927  [id number]    I certify that this patient is under my care and that I, or a nurse practitioner or physician's assistant working with me, had a face-to-face encounter that meets the physician face-to-face encounter requirements with this patient on: 11/10/2023.  The encounter with the patient was in whole, or in part, for the following medical condition, which is the primary reason for home health care:  Sepsis with encephalopathy without septic shock  I certify that, based on my findings, the following services are medically necessary home health services: Nursing, Occupational Therapy, and Physical Therapy    My clinical findings support the need for the above services because: weakness from sepsis, need for strengthening with PT; help with ADLs through OT due to cognitive impairments; and monitoring of status and vitals by nursing on discharge.  Further, I certify that my clinical findings support that this patient is homebound (i.e. absences from home require considerable and taxing effort and are for medical reasons or [employer] services or infrequently or of short duration when for other reasons) because: Unable to ambulate more than 50 feet, use of a walker  Physician signature ___________________________________   November 10, 2023  Physician name: MD Cash Brown MD  [facility]  [address] [address]  Phone: [phone]  Fax: [phone]  ______________________________________________________________________    Physical Exam   Vital  Signs: Temp: 97.7  F (36.5  C) Temp src: Tympanic BP: (!) 168/74 Pulse: 82   Resp: 16 SpO2: 95 % O2 Device: None (Room air)    Weight: 134 lbs 0 oz  General Appearance: Alert. No acute distress  Chest/Respiratory Exam: Decreased breath sounds right base. No wheezing  Cardiovascular Exam: Regular rate and rhythm. S1, S2, no murmur, gallop, or rubs.  Extremities: No lower extremity edema.  Psychiatric: Normal affect and mentation         Primary Care Physician   Kristina Fields    Discharge Orders      Home Care Referral      Reason for your hospital stay    Treated for urinary tract infection, pneumonia and cellulitis     Follow-up and recommended labs and tests     Follow up with primary care provider, Kristina Fields, within 7 days for hospital follow- up.  The following labs/tests are recommended: consider UA to ensure clearance of UTI.     Activity    Your activity upon discharge: activity as tolerated     Diet    Follow this diet upon discharge: regular diet       Significant Results and Procedures   Most Recent 3 CBC's:  Recent Labs   Lab Test 11/10/23  0606 11/09/23  0643 11/08/23  0956   WBC 7.6 10.7 15.3*   HGB 8.7* 8.4* 9.8*    98 98    134* 157     Most Recent 3 BMP's:  Recent Labs   Lab Test 11/10/23  0606 11/09/23  0643 11/08/23  0956    136 138   POTASSIUM 3.9 3.9 3.7   CHLORIDE 104 101 99   CO2 26 26 30*   BUN 22.0 20.2 21.5   CR 1.26* 1.25* 1.37*   ANIONGAP 9 9 9   AMADO 8.2 8.1* 9.1   * 113* 95     Most Recent 2 LFT's:  Recent Labs   Lab Test 07/30/22  1252 09/16/20  1632   AST 10* 12   ALT 8 15   ALKPHOS 46 87   BILITOTAL 1.0 0.9   ,   Results for orders placed or performed during the hospital encounter of 11/08/23   XR Chest Port 1 View    Narrative    PROCEDURE:  XR CHEST PORT 1 VIEW    HISTORY: fatigue    COMPARISON:  Radiograph 7/30/2022    FINDINGS: Single view chest radiograph    Cardiomediastinal silhouette is enlarged, stable. There is calcific  aortic  atherosclerosis.  Perihilar and peripheral interstitial opacities. Bibasilar groundglass  opacities. No effusion or pneumothorax.    No suspicious osseous lesion or subdiaphragmatic free air.      Impression    IMPRESSION:   Changes in the chest which are likely due to heart failure, including  cardiomegaly and bilateral pulmonary opacities. Pneumonia cannot be  excluded.    NEMO WINSTON MD         SYSTEM ID:  [id number]   Echocardiogram Complete     Value    LVEF  60-65%    Narrative    [id number]  [id number]  [id number]  [id number]^ROGELIO^JOSE     [facility]  [address]  [place] [address]     Name: KRYSTA IRVIN  MRN: [id number]  : 1927  Study Date: 2023 08:53 AM  Age: 89 yrs  Gender: Female  Patient Location: [facility]  Reason For Study: CHF  Ordering Physician: JOSE MERCADO  Performed By: Jade Garcia, ALEX, RDCS, RVT     BSA: 1.7 m2  Height: 66 in  Weight: 138 lb  HR: 81  BP: 107/43 mmHg  ______________________________________________________________________________  Procedure  Complete Portable Echo Adult.  ______________________________________________________________________________  Interpretation Summary  Global and regional left ventricular function is normal with an EF of 60-65%.  Grade III or advanced diastolic dysfunction.  Right ventricular function, chamber size, wall motion, and thickness are  normal.  Moderate tricuspid insufficiency.  Moderate pulmonary hypertension. Estimated pulmonary artery systolic pressure  is 58 mmHg.  The inferior vena cava is normal.  There is no prior study for direct comparison.  ______________________________________________________________________________  Left Ventricle  Global and regional left ventricular function is normal with an EF of 60-65%.  Left ventricular size is normal. Left ventricular wall thickness is normal.  Grade III or advanced diastolic dysfunction. No regional wall motion  abnormalities are seen.     Right  Ventricle  Right ventricular function, chamber size, wall motion, and thickness are  normal.     Atria  Moderate biatrial enlargement is present.     Mitral Valve  Mild mitral insufficiency is present.     Aortic Valve  Aortic valve sclerosis is present.     Tricuspid Valve  Moderate tricuspid insufficiency is present. Estimated pulmonary artery  systolic pressure is 55 mmHg plus right atrial pressure. Moderate pulmonary  hypertension is present.     Pulmonic Valve  The pulmonic valve is normal. Trace pulmonic insufficiency is present.     Vessels  Normal diameter aortic root and proximal ascending aorta. The inferior vena  cava is normal.     Pericardium  No pericardial effusion is present.     Compared to Previous Study  There is no prior study for direct comparison.     ______________________________________________________________________________  MMode/2D Measurements & Calculations  IVSd: 0.98 cm  LVIDd: 4.3 cm  LVIDs: 3.0 cm  LVPWd: 1.0 cm  FS: 29.3 %     LV mass(C)d: 139.4 grams  LV mass(C)dI: 81.6 grams/m2  Ao root diam: 3.0 cm  asc Aorta Diam: 3.3 cm  LVOT diam: 2.1 cm  LVOT area: 3.3 cm2  Ao root diam index Ht(cm/m): 1.8  Ao root diam index BSA (cm/m2): 1.7  Asc Ao diam index BSA (cm/m2): 1.9  Asc Ao diam index Ht(cm/m): 2.0  LA Volume (BP): 63.1 ml  LA Volume Index (BP): 36.9 ml/m2     RWT: 0.47  TAPSE: 2.0 cm     Doppler Measurements & Calculations  MV E max refugio: 120.0 cm/sec  MV A max refugio: 43.3 cm/sec  MV E/A: 2.8  MV dec time: 0.18 sec  Ao V2 max: 164.0 cm/sec  Ao max P.0 mmHg  Ao V2 mean: 115.0 cm/sec  Ao mean P.0 mmHg  Ao V2 VTI: 31.6 cm  SHAWNA(I,D): 1.6 cm2  SHAWNA(V,D): 1.8 cm2  LV V1 max PG: 3.1 mmHg  LV V1 max: 88.6 cm/sec  LV V1 VTI: 14.9 cm  SV(LVOT): 49.3 ml  SI(LVOT): 28.8 ml/m2  PA acc time: 0.07 sec  TR max refugio: 343.4 cm/sec  TR max P.3 mmHg  AV Refugio Ratio (DI): 0.54  SHAWNA Index (cm2/m2): 0.91  E/E' av.7  Lateral E/e': 14.7  Medial E/e': 14.7     RV S Refugio: 9.9 cm/sec      ______________________________________________________________________________  Report approved by: Anmol Hoover 11/09/2023 09:40 AM             Discharge Medications   Current Discharge Medication List        START taking these medications    Details   cefuroxime (CEFTIN) 500 MG tablet Take 1 tablet (500 mg) by mouth 2 times daily for 5 days  Qty: 10 tablet, Refills: 0    Associated Diagnoses: Cellulitis of right leg; UTI due to Klebsiella species; Streptococcus pneumoniae infection           CONTINUE these medications which have CHANGED    Details   levothyroxine (SYNTHROID/LEVOTHROID) 100 MCG tablet Take 1 tablet (100 mcg) by mouth daily  Qty: 90 tablet, Refills: 0    Associated Diagnoses: Hypothyroidism, unspecified type           CONTINUE these medications which have NOT CHANGED    Details   allopurinol (ZYLOPRIM) 100 MG tablet Take 200 mg by mouth daily      aspirin 81 MG EC tablet Take 81 mg by mouth daily      Cholecalciferol (VITAMIN D3) 1.25 MG (00052 UT) TABS TAKE 1 CAPSULE (125MCG) BY MOUTH EVERY DAY      furosemide (LASIX) 20 MG tablet Take 20 mg by mouth daily      gabapentin (NEURONTIN) 100 MG capsule Take 2 capsules (200 mg) by mouth nightly as needed for neuropathic pain  Qty: 30 capsule, Refills: 0      lisinopril (ZESTRIL) 20 MG tablet Take 20 mg by mouth daily      metoprolol succinate ER (TOPROL-XL) 25 MG 24 hr tablet Take 25 mg by mouth daily      pantoprazole (PROTONIX) 40 MG EC tablet Take 40 mg by mouth every morning           Allergies   Allergies   Allergen Reactions    Iodine Unknown    Celecoxib Hives    Codeine Hives    Penicillins Hives

## 2023-11-10 NOTE — PROGRESS NOTES
11/10/23 5129   Appointment Info   Signing Clinician's Name / Credentials (PT) Octavio Kisereladioblair MPT   Living Environment   People in Home facility resident   Current Living Arrangements assisted living   Home Accessibility no concerns;wheelchair accessible   Transportation Anticipated family or friend will provide   Self-Care   Usual Activity Tolerance fair   Current Activity Tolerance fair   Equipment Currently Used at Home walker, rolling;wheelchair, manual   Fall history within last six months yes   Number of times patient has fallen within last six months 3   General Information   Referring Physician Abel   Patient/Family Therapy Goals Statement (PT) return home   Existing Precautions/Restrictions fall   Weight-Bearing Status - LLE full weight-bearing   Weight-Bearing Status - RLE full weight-bearing   Cognition   Affect/Mental Status (Cognition) confused   Orientation Status (Cognition) oriented to;person;place   Follows Commands (Cognition) follows one-step commands   Memory Deficit (Cognition) minimal deficit   Pain Assessment   Patient Currently in Pain No   Integumentary/Edema   Integumentary/Edema no deficits were identifed   Posture    Posture Forward head position;Protracted shoulders   Range of Motion (ROM)   Range of Motion ROM is WFL   Strength (Manual Muscle Testing)   Strength (Manual Muscle Testing) strength is WFL   Strength Comments however, patient fatigues with activity   Bed Mobility   Impairments Contributing to Impaired Bed Mobility decreased strength   Comment, (Bed Mobility) moderate assist of 1-2   Transfers   Impairments Contributing to Impaired Transfers decreased strength   Comment, (Transfers) minimal assist of 1 with Fww   Gait/Stairs (Locomotion)   Keith Level (Gait) minimum assist (75% patient effort);1 person assist   Assistive Device (Gait) walker, 4-wheeled   Distance in Feet (Gait) 10   Pattern (Gait) step-to   Balance   Balance Comments fair with 4ww   Sensory  Examination   Sensory Perception WFL   Coordination   Coordination no deficits were identified   Muscle Tone   Muscle Tone no deficits were identified   Clinical Impression   Criteria for Skilled Therapeutic Intervention Evaluation only   PT Diagnosis (PT) impaired mobility   Influenced by the following impairments fatigue and weakness   Functional limitations due to impairments activity/gait tolerance   Clinical Presentation (PT Evaluation Complexity) stable   Clinical Decision Making (Complexity) low complexity   Risk & Benefits of therapy have been explained evaluation/treatment results reviewed;patient;son   PT Total Evaluation Time   PT Eval, Low Complexity Minutes (30352) 15   Physical Therapy Goals   PT Frequency One time eval and treatment only   PT Discharge Planning   PT Plan patient discharging   PT Discharge Recommendation (DC Rec) home with assist;home with home care physical therapy   PT Rationale for DC Rec to promote strength, stability and safe mobility   PT Brief overview of current status pleasant patient requiring moderate assist of 1-2 for supine<>sit; able to sit EOB unsupported; sit to stand and stand pivot with minimal assist of 1 and 4ww   PT Equipment Needed at Discharge walker, rolling;wheelchair   Total Session Time   Total Session Time (sum of timed and untimed services) 15

## 2023-11-10 NOTE — PROGRESS NOTES
11/10/23 1400   Appointment Info   Signing Clinician's Name / Credentials (OT) Korina Palencia OTR/L   Living Environment   Current Living Arrangements assisted living   Home Accessibility no concerns   Transportation Anticipated family or friend will provide   Self-Care   Usual Activity Tolerance moderate   Current Activity Tolerance fair   Equipment Currently Used at Home walker, rolling   Fall history within last six months yes   Number of times patient has fallen within last six months 3   Activity/Exercise/Self-Care Comment Pt uses wheelchair 90% of the time to move long distances.   General Information   Patient/Family Therapy Goal Statement (OT) To return to prior living arrangements.   Left Upper Extremity (Weight-bearing Status) full weight-bearing (FWB)   Right Upper Extremity (Weight-bearing Status) full weight-bearing (FWB)   Left Lower Extremity (Weight-bearing Status) full weight-bearing (FWB)   Right Lower Extremity (Weight-bearing Status) full weight-bearing (FWB)   Visual Perception   Visual Impairment/Limitations WFL   Pain Assessment   Patient Currently in Pain No   Range of Motion Comprehensive   Comment, General Range of Motion WFL   Strength Comprehensive (MMT)   Comment, General Manual Muscle Testing (MMT) Assessment WFL. limited endurance   Bed Mobility   Comment (Bed Mobility) Assist of 2 for supine to sit.   Activities of Daily Living   Additional Documentation   (sister in law states Catarina mix assist with all cares. She has been feeding Pt. Nursing reports PT was indepdent with breakfast this morning and all meals while at hospital)   Clinical Impression   Criteria for Skilled Therapeutic Interventions Met (OT) Evaluation only   OT Diagnosis impaired self cares   Influenced by the following impairments weakness, fatgiue   OT Problem List-Impairments impacting ADL activity tolerance impaired   Assessment of Occupational Performance 1-3 Performance Deficits   Identified Performance  Deficits tiolet transfers,   Planned Therapy Interventions (OT) ADL retraining;bed mobility training   Clinical Decision Making Complexity (OT) problem focused assessment/low complexity   Risk & Benefits of therapy have been explained care plan/treatment goals reviewed;evaluation/treatment results reviewed;risks/benefits reviewed   OT Total Evaluation Time   OT Eval, Low Complexity Minutes (18971) 15   OT Goals   Therapy Frequency (OT) One time eval and treatment   OT Predicted Duration/Target Date for Goal Attainment 11/10/23   OT Goals Bed Mobility;Toilet Transfer/Toileting;Transfers   OT: Bed Mobility Minimal assist   OT: Transfer Minimal assist   OT: Toilet Transfer/Toileting Minimal assist   Therapeutic Activities   Therapeutic Activity Minutes (34239) 15   Symptoms noted during/after treatment fatigue   Treatment Detail/Skilled Intervention Pt required assist with bed mobility. She was ableto use roling walker to ambulate to recliner with CGA. assist to change gowns   OT Discharge Planning   OT Discharge Recommendation (DC Rec) home with home care occupational therapy   OT Rationale for DC Rec Pt would benefit form home care OT to increase funcitonal activity tolerance for toiletting and dressing   OT Brief overview of current status Pt required assist with bed mobility. She was ableto use roling walker to ambulate to recliner with CGA. assist to change gowns   Total Session Time   Timed Code Treatment Minutes 15   Total Session Time (sum of timed and untimed services) 30

## 2023-11-10 NOTE — PHARMACY - DISCHARGE MEDICATION RECONCILIATION AND EDUCATION
Pharmacy:  Discharge Counseling and Medication Reconciliation    Skye Baez  [address]  [place] [address]  [phone] (home)   89 year old female  PCP: Kristina Fields    Allergies: Iodine, Celecoxib, Codeine, and Penicillins    Discharge Counseling:    Pharmacist did NOT meet with patient prior to discharge- patient is being discharged back to [facility] assisted living facility, where facility staff manage and administer her medications.      Discharge Medication Reconciliation:    It has been determined that the patient has an adequate supply of medications available or which can be obtained from [facility] facility's preferred pharmacy, which has been confirmed as: [facility] [An updated medication list will be faxed to the patient's pharmacy.]    Thank you for the consult.    Chapo Shelton [facility]........November 10, 2023 5:03 PM

## 2023-11-10 NOTE — PLAN OF CARE
Patient is a 89 year old female with history of hypertension, gout, hypothyroidism and mild dementia, resident of nursing home was taken to the ED after she was found unresponsive. The family does not want any aggressive measures but would like to treat her with antibiotics. Patient presents with right leg pain and redness suggestive of cellulitis.  She has had multiple episodes of UTIs in the past. Denies SOB. Has been retaining urine, bladder scan >300, straight cath completed with 500cc out. 1 BM this shift. Staff to transfer for toileting. PRN Oxy and Mihir administered for LE pain.     Problem: Adult Inpatient Plan of Care  Goal: Optimal Comfort and Wellbeing  Outcome: Progressing  Intervention: Monitor Pain and Promote Comfort  Recent Flowsheet Documentation  Taken 11/9/2023 1500 by Ernesto Dorsey RN  Pain Management Interventions: medication (see MAR)  Taken 11/9/2023 0700 by Ernesto Dorsey RN  Pain Management Interventions: medication (see MAR)     Problem: Pain Acute  Goal: Optimal Pain Control and Function  Outcome: Progressing  Intervention: Develop Pain Management Plan  Recent Flowsheet Documentation  Taken 11/9/2023 1500 by Ernesto Dorsey RN  Pain Management Interventions: medication (see MAR)  Taken 11/9/2023 0700 by Ernesto Dorsey RN  Pain Management Interventions: medication (see MAR)  Intervention: Prevent or Manage Pain  Recent Flowsheet Documentation  Taken 11/9/2023 1500 by Ernesto Dorsey RN  Medication Review/Management:   medications reviewed   high-risk medications identified  Taken 11/9/2023 0700 by Ernesto Dorsey RN  Medication Review/Management:   medications reviewed   high-risk medications identified     Problem: UTI (Urinary Tract Infection)  Goal: Improved Infection Symptoms  Outcome: Progressing     Problem: Adult Inpatient Plan of Care  Goal: Absence of Hospital-Acquired Illness or Injury  Intervention: Identify and Manage Fall Risk  Recent Flowsheet Documentation  Taken 11/9/2023 1500  by Ernesto Dorsey RN  Safety Promotion/Fall Prevention:   assistive device/personal items within reach   clutter free environment maintained   nonskid shoes/slippers when out of bed   patient and family education   room door open   room near nurse's station   room organization consistent   safety round/check completed   treat reversible contributory factors   treat underlying cause  Taken 11/9/2023 0700 by Ernesto Dorsey RN  Safety Promotion/Fall Prevention:   assistive device/personal items within reach   clutter free environment maintained   nonskid shoes/slippers when out of bed   patient and family education   room door open   room near nurse's station   room organization consistent   safety round/check completed   treat reversible contributory factors   treat underlying cause  Intervention: Prevent Skin Injury  Recent Flowsheet Documentation  Taken 11/9/2023 1500 by Ernesto Dorsey RN  Body Position:   turned   weight shifting   side-lying   right  Taken 11/9/2023 0700 by Ernesto Dorsey RN  Body Position:   turned   weight shifting   side-lying   right  Intervention: Prevent Infection  Recent Flowsheet Documentation  Taken 11/9/2023 1500 by Ernesto Dorsey RN  Infection Prevention:   hand hygiene promoted   rest/sleep promoted   single patient room provided  Taken 11/9/2023 0700 by Ernesto Dorsey RN  Infection Prevention:   hand hygiene promoted   rest/sleep promoted   single patient room provided   Goal Outcome Evaluation:

## 2023-11-10 NOTE — PLAN OF CARE
SAFETY CHECKLIST  ID Bands and Risk clasps correct and in place (DNR, Fall risk, Allergy, Latex, Limb):  Yes  All Lines Reconciled and labeled correctly: Yes  Whiteboard updated:Yes  Environmental interventions: Yes  Verify Tele #: N/A    Renea Neff RN .......  11/10/2023  7:29 AM

## 2023-11-10 NOTE — PROGRESS NOTES
Urine culture shows gram negative rods  Urine strep Ag positive    Patient is on ceftriaxone and azithromycin. Continue current antibiotics.    Jamal Benites M.D. 11/9/2023  6:33 PM

## 2023-11-10 NOTE — PLAN OF CARE
Goal Outcome Evaluation:      Plan of Care Reviewed With: patient    Overall Patient Progress: improving     VSS. Up with assist of two to BSC. Voiding well, bladder scans so far this shift 214 and 142. Continues to receive IV antibiotics. C/O pain to RLE, received prn oxycodone 2.5 mg with good relief. Redness to RLE marked and receding from outline. Warm to touch. Slight edema noted to that ankle. Denies any other complaints.

## 2023-11-10 NOTE — PHARMACY
[facility]  Part of [address] [address]    November 10, 2023    Dear Pharmacist,    Your customer, Skye Cardenas Victor Manuel Baez, born on 7/3/1927, was recently discharged from [facility].  We have updated her medication list and want to alert you to the following:       Review of your medicines        START taking        Dose / Directions   cefuroxime 500 MG tablet  Commonly known as: CEFTIN  Used for: Cellulitis of right leg, UTI due to Klebsiella species, Streptococcus pneumoniae infection      Dose: 500 mg  Take 1 tablet (500 mg) by mouth 2 times daily for 5 days  Quantity: 10 tablet  Refills: 0            CONTINUE these medicines which may have CHANGED, or have new prescriptions. If we are uncertain of the size of tablets/capsules you have at home, strength may be listed as something that might have changed.        Dose / Directions   gabapentin 100 MG capsule  Commonly known as: NEURONTIN  This may have changed: Another medication with the same name was removed. Continue taking this medication, and follow the directions you see here.      Dose: 200 mg  Take 2 capsules (200 mg) by mouth nightly as needed for neuropathic pain  Quantity: 30 capsule  Refills: 0     levothyroxine 100 MCG tablet  Commonly known as: SYNTHROID/LEVOTHROID  This may have changed:   medication strength  how much to take  Used for: Hypothyroidism, unspecified type      Dose: 100 mcg  Take 1 tablet (100 mcg) by mouth daily  Quantity: 90 tablet  Refills: 0            CONTINUE these medicines which have NOT CHANGED        Dose / Directions   allopurinol 100 MG tablet  Commonly known as: ZYLOPRIM      Dose: 200 mg  Take 200 mg by mouth daily  Refills: 0     aspirin 81 MG EC tablet      Dose: 81 mg  Take 81 mg by mouth daily  Refills: 0     furosemide 20 MG tablet  Commonly known as: LASIX      Dose: 20 mg  Take 20 mg by mouth daily  Refills: 0     lisinopril 20 MG  tablet  Commonly known as: ZESTRIL      Dose: 20 mg  Take 20 mg by mouth daily  Refills: 0     metoprolol succinate ER 25 MG 24 hr tablet  Commonly known as: TOPROL XL      Dose: 25 mg  Take 25 mg by mouth daily  Refills: 0     pantoprazole 40 MG EC tablet  Commonly known as: PROTONIX      Dose: 40 mg  Take 40 mg by mouth every morning  Refills: 0     Vitamin D3 1.25 MG (24487 UT) Tabs      TAKE 1 CAPSULE (125MCG) BY MOUTH EVERY DAY  Refills: 0               Where to get your medicines        These medications were sent to [id number] ([facility]) - [place], [place] - [address]  [address] [facility] [address]      Phone: [phone]   cefuroxime 500 MG tablet  levothyroxine 100 MCG tablet         We also reviewed Skye Baez's allergy list and updated it as needed:  Allergies: Iodine, Celecoxib, Codeine, and Penicillins    Thank you for continuing to care for Skye Baez.  We look forward to working together with you in the future.    Sincerely,  Chapo Shelton, [facility]     Dr Pederson

## 2023-11-10 NOTE — PROGRESS NOTES
:    Met with patient and family to discuss discharge planning needs. I educated them about homecare services and they were interested.     Pt/family was given the Medicare Compare list for Home Care, with associated star ratings to assist with choice for referrals/discharge planning Yes    Education was given to pt/family that star ratings are updated/maintained by Medicare and can be reviewed by visiting www.medicare.gov Yes    Patient and family asked for a referral to be sent to [facility]. Referral has been sent.     TIFFANY Pollack on 11/10/2023 at 3:41 PM    Received update from [facility] and they are able to start homecare services next week. Patient and family have been updated. I provided family with brochure for [facility] as they were interested in this resource. Family will transport patient back to [facility]. Family was not interested in [facility] at this time. They want to discuss this with patient primary care provider at her follow-up appointment.     Faxed over homecare orders to [facility].     Updated [facility] about patient discharging from the hospital today and has [facility] services setup.     No further needs from .     TIFFANY Pollack on 11/10/2023 at 3:54 PM

## 2023-11-10 NOTE — PROGRESS NOTES
Interdisciplinary Discharge Planning Note    Anticipated Discharge Date: 11/10-11/11    Anticipated Discharge Location: [facility]    Clinical Needs Before Discharge:   Needs IV ABX switched to oral    Treatment Needs After Discharge:  None identified    Potential Barriers to Discharge: Family may be able to transport    ALBAN Sanchez  11/10/2023,  12:59 PM

## 2023-11-13 NOTE — TELEPHONE ENCOUNTER
Transitional Care and Medication Management Phone Call    Summary of hospitalization:  [facility] discharge summary reviewed  DISCHARGE DIAGNOSIS: sepsis with encephalopathy without septic shock  DATE OF DISCHARGE: 11/10/2023    Diagnostic Tests/Treatments reviewed.  Follow up needed: with PCP Kristina Fields within 7 days to ensure clearance of UTI    Post Discharge Medication Reconciliation: discharge medications reconciled, continue medications without change.  Medications reviewed by: by myself    Problems taking medications regularly:  None  Problems adhering to non-medication therapy:  None    Other Healthcare Providers Involved in Patient s Care:          Resides at [facility]  Update since discharge: improved.   Plan of care communicated with caregiver, states she has follow up on 11/21 with PCP.  Just a friendly reminder that you appointment is .  We encourage you to keep this appointment.  Please remember to bring all of your pills in their bottles (including any vitamins or over the counter pills) with you to your appointment.   The patient indicates understanding of these issues and agrees with the plan of care.   Yes    Was the patient contacted within the 2 business days or other approved timeframe?  Yes  Was the Medication reconciliation and management done since the patient was discharged? Yes    Dia Wahl RN  11/13/2023 10:44 AM

## 2024-01-01 ENCOUNTER — APPOINTMENT (OUTPATIENT)
Dept: GENERAL RADIOLOGY | Facility: OTHER | Age: 89
DRG: 445 | End: 2024-01-01
Payer: MEDICARE

## 2024-01-01 ENCOUNTER — APPOINTMENT (OUTPATIENT)
Dept: GENERAL RADIOLOGY | Facility: OTHER | Age: 89
End: 2024-01-01
Attending: FAMILY MEDICINE
Payer: MEDICARE

## 2024-01-01 ENCOUNTER — PATIENT OUTREACH (OUTPATIENT)
Dept: FAMILY MEDICINE | Facility: OTHER | Age: 89
End: 2024-01-01
Payer: COMMERCIAL

## 2024-01-01 ENCOUNTER — HOSPITAL ENCOUNTER (EMERGENCY)
Facility: OTHER | Age: 89
Discharge: NURSING, REHAB OR LONG-TERM CARE | End: 2024-01-20
Attending: FAMILY MEDICINE | Admitting: FAMILY MEDICINE
Payer: MEDICARE

## 2024-01-01 ENCOUNTER — TELEPHONE (OUTPATIENT)
Dept: EMERGENCY MEDICINE | Facility: OTHER | Age: 89
End: 2024-01-01
Payer: COMMERCIAL

## 2024-01-01 ENCOUNTER — APPOINTMENT (OUTPATIENT)
Dept: CT IMAGING | Facility: OTHER | Age: 89
DRG: 445 | End: 2024-01-01
Payer: MEDICARE

## 2024-01-01 ENCOUNTER — HOSPITAL ENCOUNTER (INPATIENT)
Facility: OTHER | Age: 89
LOS: 1 days | Discharge: HOSPICE | DRG: 445 | End: 2024-03-15
Admitting: INTERNAL MEDICINE
Payer: MEDICARE

## 2024-01-01 VITALS
HEART RATE: 71 BPM | SYSTOLIC BLOOD PRESSURE: 103 MMHG | DIASTOLIC BLOOD PRESSURE: 53 MMHG | RESPIRATION RATE: 14 BRPM | TEMPERATURE: 99.4 F | OXYGEN SATURATION: 97 %

## 2024-01-01 VITALS
RESPIRATION RATE: 12 BRPM | OXYGEN SATURATION: 99 % | HEART RATE: 80 BPM | DIASTOLIC BLOOD PRESSURE: 45 MMHG | TEMPERATURE: 96 F | SYSTOLIC BLOOD PRESSURE: 79 MMHG

## 2024-01-01 DIAGNOSIS — D64.9 ANEMIA: ICD-10-CM

## 2024-01-01 DIAGNOSIS — K83.09 CHOLANGITIS (H): ICD-10-CM

## 2024-01-01 DIAGNOSIS — R17 ELEVATED BILIRUBIN: ICD-10-CM

## 2024-01-01 DIAGNOSIS — D64.9 ANEMIA, UNSPECIFIED TYPE: ICD-10-CM

## 2024-01-01 DIAGNOSIS — I48.91 ATRIAL FIBRILLATION, UNSPECIFIED TYPE (H): ICD-10-CM

## 2024-01-01 DIAGNOSIS — Z90.49 ACQUIRED ABSENCE OF LARGE INTESTINE: ICD-10-CM

## 2024-01-01 DIAGNOSIS — Z51.5 END OF LIFE CARE: Primary | ICD-10-CM

## 2024-01-01 DIAGNOSIS — U07.1 PNEUMONIA DUE TO 2019 NOVEL CORONAVIRUS: ICD-10-CM

## 2024-01-01 DIAGNOSIS — Z20.822 LAB TEST NEGATIVE FOR COVID-19 VIRUS: ICD-10-CM

## 2024-01-01 DIAGNOSIS — R65.20 SEVERE SEPSIS WITH ACUTE ORGAN DYSFUNCTION (H): Primary | ICD-10-CM

## 2024-01-01 DIAGNOSIS — E83.42 HYPOMAGNESEMIA: ICD-10-CM

## 2024-01-01 DIAGNOSIS — E03.9 HYPOTHYROIDISM, UNSPECIFIED TYPE: ICD-10-CM

## 2024-01-01 DIAGNOSIS — J12.82 PNEUMONIA DUE TO 2019 NOVEL CORONAVIRUS: ICD-10-CM

## 2024-01-01 DIAGNOSIS — N17.9 AKI (ACUTE KIDNEY INJURY) (H): ICD-10-CM

## 2024-01-01 DIAGNOSIS — A41.9 SEVERE SEPSIS WITH ACUTE ORGAN DYSFUNCTION (H): Primary | ICD-10-CM

## 2024-01-01 LAB
ALBUMIN SERPL BCG-MCNC: 2.6 G/DL (ref 3.5–5.2)
ALBUMIN SERPL BCG-MCNC: 3.2 G/DL (ref 3.5–5.2)
ALBUMIN UR-MCNC: NEGATIVE MG/DL
ALBUMIN UR-MCNC: NEGATIVE MG/DL
ALP SERPL-CCNC: 522 U/L (ref 40–150)
ALP SERPL-CCNC: 729 U/L (ref 40–150)
ALT SERPL W P-5'-P-CCNC: 23 U/L (ref 0–50)
ALT SERPL W P-5'-P-CCNC: 50 U/L (ref 0–50)
ANION GAP SERPL CALCULATED.3IONS-SCNC: 12 MMOL/L (ref 7–15)
ANION GAP SERPL CALCULATED.3IONS-SCNC: 12 MMOL/L (ref 7–15)
APPEARANCE UR: CLEAR
APPEARANCE UR: CLEAR
AST SERPL W P-5'-P-CCNC: 106 U/L (ref 0–45)
AST SERPL W P-5'-P-CCNC: 39 U/L (ref 0–45)
BACTERIA BLD CULT: ABNORMAL
BASOPHILS # BLD AUTO: 0 10E3/UL (ref 0–0.2)
BASOPHILS # BLD AUTO: 0.1 10E3/UL (ref 0–0.2)
BASOPHILS NFR BLD AUTO: 0 %
BASOPHILS NFR BLD AUTO: 0 %
BILIRUB SERPL-MCNC: 1.2 MG/DL
BILIRUB SERPL-MCNC: 3.9 MG/DL
BILIRUB UR QL STRIP: NEGATIVE
BILIRUB UR QL STRIP: NEGATIVE
BUN SERPL-MCNC: 25.7 MG/DL (ref 8–23)
BUN SERPL-MCNC: 44.5 MG/DL (ref 8–23)
CALCIUM SERPL-MCNC: 8.6 MG/DL (ref 8.2–9.6)
CALCIUM SERPL-MCNC: 9.2 MG/DL (ref 8.2–9.6)
CHLORIDE SERPL-SCNC: 100 MMOL/L (ref 98–107)
CHLORIDE SERPL-SCNC: 99 MMOL/L (ref 98–107)
COLOR UR AUTO: ABNORMAL
COLOR UR AUTO: YELLOW
CREAT SERPL-MCNC: 1.79 MG/DL (ref 0.51–0.95)
CREAT SERPL-MCNC: 2.5 MG/DL (ref 0.51–0.95)
DEPRECATED HCO3 PLAS-SCNC: 24 MMOL/L (ref 22–29)
DEPRECATED HCO3 PLAS-SCNC: 25 MMOL/L (ref 22–29)
EGFRCR SERPLBLD CKD-EPI 2021: 17 ML/MIN/1.73M2
EGFRCR SERPLBLD CKD-EPI 2021: 26 ML/MIN/1.73M2
EOSINOPHIL # BLD AUTO: 0 10E3/UL (ref 0–0.7)
EOSINOPHIL # BLD AUTO: 0 10E3/UL (ref 0–0.7)
EOSINOPHIL NFR BLD AUTO: 0 %
EOSINOPHIL NFR BLD AUTO: 0 %
ERYTHROCYTE [DISTWIDTH] IN BLOOD BY AUTOMATED COUNT: 15.4 % (ref 10–15)
ERYTHROCYTE [DISTWIDTH] IN BLOOD BY AUTOMATED COUNT: 16.7 % (ref 10–15)
FLUAV RNA SPEC QL NAA+PROBE: NEGATIVE
FLUAV RNA SPEC QL NAA+PROBE: NEGATIVE
FLUBV RNA RESP QL NAA+PROBE: NEGATIVE
FLUBV RNA RESP QL NAA+PROBE: NEGATIVE
GLUCOSE SERPL-MCNC: 128 MG/DL (ref 70–99)
GLUCOSE SERPL-MCNC: 135 MG/DL (ref 70–99)
GLUCOSE UR STRIP-MCNC: NEGATIVE MG/DL
GLUCOSE UR STRIP-MCNC: NEGATIVE MG/DL
HCT VFR BLD AUTO: 26.3 % (ref 35–47)
HCT VFR BLD AUTO: 35.3 % (ref 35–47)
HGB BLD-MCNC: 12 G/DL (ref 11.7–15.7)
HGB BLD-MCNC: 8.6 G/DL (ref 11.7–15.7)
HGB UR QL STRIP: NEGATIVE
HGB UR QL STRIP: NEGATIVE
HOLD SPECIMEN: NORMAL
HYALINE CASTS: 11 /LPF
HYALINE CASTS: 5 /LPF
IMM GRANULOCYTES # BLD: 0.1 10E3/UL
IMM GRANULOCYTES # BLD: 0.1 10E3/UL
IMM GRANULOCYTES NFR BLD: 1 %
IMM GRANULOCYTES NFR BLD: 1 %
KETONES UR STRIP-MCNC: NEGATIVE MG/DL
KETONES UR STRIP-MCNC: NEGATIVE MG/DL
LACTATE SERPL-SCNC: 1.8 MMOL/L (ref 0.7–2)
LACTATE SERPL-SCNC: 2.3 MMOL/L (ref 0.7–2)
LEUKOCYTE ESTERASE UR QL STRIP: NEGATIVE
LEUKOCYTE ESTERASE UR QL STRIP: NEGATIVE
LIPASE SERPL-CCNC: 14 U/L (ref 13–60)
LYMPHOCYTES # BLD AUTO: 0.8 10E3/UL (ref 0.8–5.3)
LYMPHOCYTES # BLD AUTO: 0.8 10E3/UL (ref 0.8–5.3)
LYMPHOCYTES NFR BLD AUTO: 5 %
LYMPHOCYTES NFR BLD AUTO: 5 %
MAGNESIUM SERPL-MCNC: 1.5 MG/DL (ref 1.7–2.3)
MCH RBC QN AUTO: 32.7 PG (ref 26.5–33)
MCH RBC QN AUTO: 33.1 PG (ref 26.5–33)
MCHC RBC AUTO-ENTMCNC: 32.7 G/DL (ref 31.5–36.5)
MCHC RBC AUTO-ENTMCNC: 34 G/DL (ref 31.5–36.5)
MCV RBC AUTO: 100 FL (ref 78–100)
MCV RBC AUTO: 97 FL (ref 78–100)
MONOCYTES # BLD AUTO: 0.5 10E3/UL (ref 0–1.3)
MONOCYTES # BLD AUTO: 0.8 10E3/UL (ref 0–1.3)
MONOCYTES NFR BLD AUTO: 3 %
MONOCYTES NFR BLD AUTO: 5 %
MUCOUS THREADS #/AREA URNS LPF: PRESENT /LPF
MUCOUS THREADS #/AREA URNS LPF: PRESENT /LPF
NEUTROPHILS # BLD AUTO: 14.4 10E3/UL (ref 1.6–8.3)
NEUTROPHILS # BLD AUTO: 15.4 10E3/UL (ref 1.6–8.3)
NEUTROPHILS NFR BLD AUTO: 89 %
NEUTROPHILS NFR BLD AUTO: 91 %
NITRATE UR QL: NEGATIVE
NITRATE UR QL: NEGATIVE
NRBC # BLD AUTO: 0 10E3/UL
NRBC # BLD AUTO: 0 10E3/UL
NRBC BLD AUTO-RTO: 0 /100
NRBC BLD AUTO-RTO: 0 /100
PH UR STRIP: 5 [PH] (ref 5–9)
PH UR STRIP: 5 [PH] (ref 5–9)
PLATELET # BLD AUTO: 111 10E3/UL (ref 150–450)
PLATELET # BLD AUTO: 158 10E3/UL (ref 150–450)
POTASSIUM SERPL-SCNC: 4.5 MMOL/L (ref 3.4–5.3)
POTASSIUM SERPL-SCNC: 4.8 MMOL/L (ref 3.4–5.3)
PROCALCITONIN SERPL IA-MCNC: 1.07 NG/ML
PROT SERPL-MCNC: 4.9 G/DL (ref 6.4–8.3)
PROT SERPL-MCNC: 6 G/DL (ref 6.4–8.3)
RBC # BLD AUTO: 2.63 10E6/UL (ref 3.8–5.2)
RBC # BLD AUTO: 3.63 10E6/UL (ref 3.8–5.2)
RBC URINE: 0 /HPF
RBC URINE: <1 /HPF
RSV RNA SPEC NAA+PROBE: NEGATIVE
RSV RNA SPEC NAA+PROBE: NEGATIVE
SARS-COV-2 RNA RESP QL NAA+PROBE: NEGATIVE
SARS-COV-2 RNA RESP QL NAA+PROBE: POSITIVE
SODIUM SERPL-SCNC: 136 MMOL/L (ref 135–145)
SODIUM SERPL-SCNC: 136 MMOL/L (ref 135–145)
SP GR UR STRIP: 1.01 (ref 1–1.03)
SP GR UR STRIP: 1.01 (ref 1–1.03)
TROPONIN T SERPL HS-MCNC: 60 NG/L
TROPONIN T SERPL HS-MCNC: 82 NG/L
UROBILINOGEN UR STRIP-MCNC: 6 MG/DL
UROBILINOGEN UR STRIP-MCNC: NORMAL MG/DL
WBC # BLD AUTO: 15.9 10E3/UL (ref 4–11)
WBC # BLD AUTO: 17.2 10E3/UL (ref 4–11)
WBC URINE: 1 /HPF
WBC URINE: <1 /HPF

## 2024-01-01 PROCEDURE — 258N000003 HC RX IP 258 OP 636

## 2024-01-01 PROCEDURE — 80053 COMPREHEN METABOLIC PANEL: CPT | Performed by: FAMILY MEDICINE

## 2024-01-01 PROCEDURE — 87637 SARSCOV2&INF A&B&RSV AMP PRB: CPT

## 2024-01-01 PROCEDURE — 99284 EMERGENCY DEPT VISIT MOD MDM: CPT | Performed by: FAMILY MEDICINE

## 2024-01-01 PROCEDURE — 83735 ASSAY OF MAGNESIUM: CPT

## 2024-01-01 PROCEDURE — 83690 ASSAY OF LIPASE: CPT

## 2024-01-01 PROCEDURE — 250N000011 HC RX IP 250 OP 636: Performed by: FAMILY MEDICINE

## 2024-01-01 PROCEDURE — 96365 THER/PROPH/DIAG IV INF INIT: CPT

## 2024-01-01 PROCEDURE — 93005 ELECTROCARDIOGRAM TRACING: CPT

## 2024-01-01 PROCEDURE — 81001 URINALYSIS AUTO W/SCOPE: CPT | Performed by: FAMILY MEDICINE

## 2024-01-01 PROCEDURE — 99285 EMERGENCY DEPT VISIT HI MDM: CPT

## 2024-01-01 PROCEDURE — 99235 HOSP IP/OBS SAME DATE MOD 70: CPT | Performed by: INTERNAL MEDICINE

## 2024-01-01 PROCEDURE — 87040 BLOOD CULTURE FOR BACTERIA: CPT

## 2024-01-01 PROCEDURE — 36415 COLL VENOUS BLD VENIPUNCTURE: CPT | Performed by: FAMILY MEDICINE

## 2024-01-01 PROCEDURE — 250N000011 HC RX IP 250 OP 636

## 2024-01-01 PROCEDURE — 83605 ASSAY OF LACTIC ACID: CPT | Performed by: FAMILY MEDICINE

## 2024-01-01 PROCEDURE — 258N000003 HC RX IP 258 OP 636: Performed by: FAMILY MEDICINE

## 2024-01-01 PROCEDURE — 85025 COMPLETE CBC W/AUTO DIFF WBC: CPT | Performed by: FAMILY MEDICINE

## 2024-01-01 PROCEDURE — 36415 COLL VENOUS BLD VENIPUNCTURE: CPT

## 2024-01-01 PROCEDURE — 99284 EMERGENCY DEPT VISIT MOD MDM: CPT | Mod: 25 | Performed by: FAMILY MEDICINE

## 2024-01-01 PROCEDURE — 80053 COMPREHEN METABOLIC PANEL: CPT

## 2024-01-01 PROCEDURE — 96366 THER/PROPH/DIAG IV INF ADDON: CPT | Performed by: FAMILY MEDICINE

## 2024-01-01 PROCEDURE — 120N000001 HC R&B MED SURG/OB

## 2024-01-01 PROCEDURE — 87637 SARSCOV2&INF A&B&RSV AMP PRB: CPT | Performed by: FAMILY MEDICINE

## 2024-01-01 PROCEDURE — 96366 THER/PROPH/DIAG IV INF ADDON: CPT

## 2024-01-01 PROCEDURE — 93010 ELECTROCARDIOGRAM REPORT: CPT | Performed by: INTERNAL MEDICINE

## 2024-01-01 PROCEDURE — 71045 X-RAY EXAM CHEST 1 VIEW: CPT

## 2024-01-01 PROCEDURE — 84145 PROCALCITONIN (PCT): CPT | Performed by: FAMILY MEDICINE

## 2024-01-01 PROCEDURE — 99285 EMERGENCY DEPT VISIT HI MDM: CPT | Mod: 25

## 2024-01-01 PROCEDURE — 84484 ASSAY OF TROPONIN QUANT: CPT

## 2024-01-01 PROCEDURE — 81001 URINALYSIS AUTO W/SCOPE: CPT

## 2024-01-01 PROCEDURE — 96365 THER/PROPH/DIAG IV INF INIT: CPT | Performed by: FAMILY MEDICINE

## 2024-01-01 PROCEDURE — 96367 TX/PROPH/DG ADDL SEQ IV INF: CPT

## 2024-01-01 PROCEDURE — 83605 ASSAY OF LACTIC ACID: CPT

## 2024-01-01 PROCEDURE — 71045 X-RAY EXAM CHEST 1 VIEW: CPT | Mod: TC

## 2024-01-01 PROCEDURE — 85025 COMPLETE CBC W/AUTO DIFF WBC: CPT

## 2024-01-01 PROCEDURE — 74176 CT ABD & PELVIS W/O CONTRAST: CPT | Mod: TC,MG

## 2024-01-01 PROCEDURE — 99207 PR NO CHARGE LOS: CPT | Performed by: INTERNAL MEDICINE

## 2024-01-01 RX ORDER — HYDROMORPHONE HYDROCHLORIDE 2 MG/1
1 TABLET ORAL
Qty: 90 TABLET | Refills: 0 | Status: SHIPPED | OUTPATIENT
Start: 2024-01-01

## 2024-01-01 RX ORDER — LEVOFLOXACIN 5 MG/ML
750 INJECTION, SOLUTION INTRAVENOUS ONCE
Status: COMPLETED | OUTPATIENT
Start: 2024-01-01 | End: 2024-01-01

## 2024-01-01 RX ORDER — CHOLESTYRAMINE 4 G/9G
4 POWDER, FOR SUSPENSION ORAL DAILY
COMMUNITY
Start: 2023-01-01

## 2024-01-01 RX ORDER — ONDANSETRON 4 MG/1
4 TABLET, ORALLY DISINTEGRATING ORAL EVERY 6 HOURS PRN
Status: DISCONTINUED | OUTPATIENT
Start: 2024-01-01 | End: 2024-01-01 | Stop reason: HOSPADM

## 2024-01-01 RX ORDER — NALOXONE HYDROCHLORIDE 0.4 MG/ML
0.2 INJECTION, SOLUTION INTRAMUSCULAR; INTRAVENOUS; SUBCUTANEOUS
Status: DISCONTINUED | OUTPATIENT
Start: 2024-01-01 | End: 2024-01-01 | Stop reason: HOSPADM

## 2024-01-01 RX ORDER — GLIPIZIDE 10 MG/1
2 TABLET ORAL EVERY 8 HOURS PRN
Status: DISCONTINUED | OUTPATIENT
Start: 2024-01-01 | End: 2024-01-01 | Stop reason: HOSPADM

## 2024-01-01 RX ORDER — LORAZEPAM 2 MG/ML
1 INJECTION INTRAMUSCULAR
Status: DISCONTINUED | OUTPATIENT
Start: 2024-01-01 | End: 2024-01-01 | Stop reason: HOSPADM

## 2024-01-01 RX ORDER — ALLOPURINOL 300 MG/1
300 TABLET ORAL DAILY
COMMUNITY
Start: 2024-01-01

## 2024-01-01 RX ORDER — TRAMADOL HYDROCHLORIDE 50 MG/1
50 TABLET ORAL EVERY 6 HOURS PRN
Status: ON HOLD | COMMUNITY
Start: 2023-01-01 | End: 2024-01-01

## 2024-01-01 RX ORDER — MINERAL OIL/HYDROPHIL PETROLAT
OINTMENT (GRAM) TOPICAL EVERY 8 HOURS PRN
Status: DISCONTINUED | OUTPATIENT
Start: 2024-01-01 | End: 2024-01-01 | Stop reason: HOSPADM

## 2024-01-01 RX ORDER — ONDANSETRON 4 MG/1
4 TABLET, ORALLY DISINTEGRATING ORAL EVERY 6 HOURS PRN
Qty: 90 TABLET | Refills: 0 | Status: SHIPPED | OUTPATIENT
Start: 2024-01-01

## 2024-01-01 RX ORDER — NALOXONE HYDROCHLORIDE 0.4 MG/ML
0.1 INJECTION, SOLUTION INTRAMUSCULAR; INTRAVENOUS; SUBCUTANEOUS
Status: DISCONTINUED | OUTPATIENT
Start: 2024-01-01 | End: 2024-01-01 | Stop reason: HOSPADM

## 2024-01-01 RX ORDER — LEVOFLOXACIN 5 MG/ML
750 INJECTION, SOLUTION INTRAVENOUS ONCE
Status: DISCONTINUED | OUTPATIENT
Start: 2024-01-01 | End: 2024-01-01

## 2024-01-01 RX ORDER — MAGNESIUM SULFATE HEPTAHYDRATE 40 MG/ML
2 INJECTION, SOLUTION INTRAVENOUS ONCE
Status: COMPLETED | OUTPATIENT
Start: 2024-01-01 | End: 2024-01-01

## 2024-01-01 RX ORDER — SENNOSIDES 8.6 MG
1 TABLET ORAL 2 TIMES DAILY PRN
Status: DISCONTINUED | OUTPATIENT
Start: 2024-01-01 | End: 2024-01-01 | Stop reason: HOSPADM

## 2024-01-01 RX ORDER — CEFEPIME HYDROCHLORIDE 1 G/1
1 INJECTION, POWDER, FOR SOLUTION INTRAMUSCULAR; INTRAVENOUS ONCE
Status: COMPLETED | OUTPATIENT
Start: 2024-01-01 | End: 2024-01-01

## 2024-01-01 RX ORDER — ONDANSETRON 2 MG/ML
4 INJECTION INTRAMUSCULAR; INTRAVENOUS EVERY 6 HOURS PRN
Status: DISCONTINUED | OUTPATIENT
Start: 2024-01-01 | End: 2024-01-01 | Stop reason: HOSPADM

## 2024-01-01 RX ORDER — LEVOFLOXACIN 500 MG/1
500 TABLET, FILM COATED ORAL
Qty: 4 TABLET | Refills: 0 | Status: SHIPPED | OUTPATIENT
Start: 2024-01-01 | End: 2024-01-01

## 2024-01-01 RX ORDER — MIRTAZAPINE 7.5 MG/1
7.5 TABLET, FILM COATED ORAL AT BEDTIME
COMMUNITY
Start: 2024-01-01

## 2024-01-01 RX ORDER — LEVOFLOXACIN 5 MG/ML
500 INJECTION, SOLUTION INTRAVENOUS ONCE
Status: DISCONTINUED | OUTPATIENT
Start: 2024-01-01 | End: 2024-01-01

## 2024-01-01 RX ORDER — LORAZEPAM 1 MG/1
1 TABLET ORAL
Status: DISCONTINUED | OUTPATIENT
Start: 2024-01-01 | End: 2024-01-01 | Stop reason: HOSPADM

## 2024-01-01 RX ORDER — LORAZEPAM 1 MG/1
1 TABLET ORAL
Qty: 90 TABLET | Refills: 0 | Status: SHIPPED | OUTPATIENT
Start: 2024-01-01

## 2024-01-01 RX ORDER — BISACODYL 10 MG
10 SUPPOSITORY, RECTAL RECTAL
Status: DISCONTINUED | OUTPATIENT
Start: 2024-01-01 | End: 2024-01-01 | Stop reason: HOSPADM

## 2024-01-01 RX ORDER — ATROPINE SULFATE 10 MG/ML
2 SOLUTION/ DROPS OPHTHALMIC EVERY 4 HOURS PRN
Status: DISCONTINUED | OUTPATIENT
Start: 2024-01-01 | End: 2024-01-01 | Stop reason: HOSPADM

## 2024-01-01 RX ORDER — HYDROMORPHONE HYDROCHLORIDE 1 MG/ML
0.5 INJECTION, SOLUTION INTRAMUSCULAR; INTRAVENOUS; SUBCUTANEOUS
Status: DISCONTINUED | OUTPATIENT
Start: 2024-01-01 | End: 2024-01-01

## 2024-01-01 RX ORDER — HYDROMORPHONE HYDROCHLORIDE 1 MG/ML
0.3 INJECTION, SOLUTION INTRAMUSCULAR; INTRAVENOUS; SUBCUTANEOUS
Status: DISCONTINUED | OUTPATIENT
Start: 2024-01-01 | End: 2024-01-01

## 2024-01-01 RX ORDER — LIDOCAINE 40 MG/G
CREAM TOPICAL
Status: DISCONTINUED | OUTPATIENT
Start: 2024-01-01 | End: 2024-01-01 | Stop reason: HOSPADM

## 2024-01-01 RX ORDER — LEVOTHYROXINE SODIUM 100 UG/1
TABLET ORAL
Qty: 90 TABLET | Refills: 0 | OUTPATIENT
Start: 2024-01-01

## 2024-01-01 RX ADMIN — SODIUM CHLORIDE 1000 ML: 9 INJECTION, SOLUTION INTRAVENOUS at 18:11

## 2024-01-01 RX ADMIN — LEVOFLOXACIN 750 MG: 5 INJECTION, SOLUTION INTRAVENOUS at 18:35

## 2024-01-01 RX ADMIN — MAGNESIUM SULFATE HEPTAHYDRATE 2 G: 40 INJECTION, SOLUTION INTRAVENOUS at 22:51

## 2024-01-01 RX ADMIN — SODIUM CHLORIDE 500 ML: 9 INJECTION, SOLUTION INTRAVENOUS at 22:54

## 2024-01-01 RX ADMIN — SODIUM CHLORIDE 1000 ML: 9 INJECTION, SOLUTION INTRAVENOUS at 19:14

## 2024-01-01 RX ADMIN — SODIUM CHLORIDE 500 ML: 9 INJECTION, SOLUTION INTRAVENOUS at 22:20

## 2024-01-01 RX ADMIN — CEFEPIME HYDROCHLORIDE 1 G: 1 INJECTION, POWDER, FOR SOLUTION INTRAMUSCULAR; INTRAVENOUS at 00:53

## 2024-01-01 ASSESSMENT — ACTIVITIES OF DAILY LIVING (ADL)
TOILETING: 2-->COMPLETELY DEPENDENT
ADLS_ACUITY_SCORE: 55
ADLS_ACUITY_SCORE: 35
FALL_HISTORY_WITHIN_LAST_SIX_MONTHS: YES
EQUIPMENT_CURRENTLY_USED_AT_HOME: WALKER, ROLLING;WHEELCHAIR, MANUAL
CONCENTRATING,_REMEMBERING_OR_MAKING_DECISIONS_DIFFICULTY: YES
TOILETING_ASSISTANCE: TOILETING DIFFICULTY, DEPENDENT
ADLS_ACUITY_SCORE: 49
ADLS_ACUITY_SCORE: 55
ADLS_ACUITY_SCORE: 38
ADLS_ACUITY_SCORE: 49
ADLS_ACUITY_SCORE: 49
ADLS_ACUITY_SCORE: 55
TOILETING_MANAGEMENT: BRIEF
DOING_ERRANDS_INDEPENDENTLY_DIFFICULTY: YES
ADLS_ACUITY_SCORE: 49
ADLS_ACUITY_SCORE: 38
ADLS_ACUITY_SCORE: 38
DIFFICULTY_EATING/SWALLOWING: NO
TOILETING: 2-->COMPLETELY DEPENDENT (NOT DEVELOPMENTALLY APPROPRIATE)
DRESSING/BATHING_DIFFICULTY: YES
DRESSING/BATHING: BATHING DIFFICULTY, DEPENDENT;DRESSING DIFFICULTY, DEPENDENT
ADLS_ACUITY_SCORE: 38
ADLS_ACUITY_SCORE: 35
VISION_MANAGEMENT: GLASSES
WEAR_GLASSES_OR_BLIND: YES
ADLS_ACUITY_SCORE: 55
WALKING_OR_CLIMBING_STAIRS: AMBULATION DIFFICULTY, REQUIRES EQUIPMENT
NUMBER_OF_TIMES_PATIENT_HAS_FALLEN_WITHIN_LAST_SIX_MONTHS: 1
CHANGE_IN_FUNCTIONAL_STATUS_SINCE_ONSET_OF_CURRENT_ILLNESS/INJURY: YES
ADLS_ACUITY_SCORE: 38
ADLS_ACUITY_SCORE: 55
ADLS_ACUITY_SCORE: 55
ADLS_ACUITY_SCORE: 45
HEARING_DIFFICULTY_OR_DEAF: NO
DIFFICULTY_COMMUNICATING: NO
ADLS_ACUITY_SCORE: 38
ADLS_ACUITY_SCORE: 49
ADLS_ACUITY_SCORE: 49
WALKING_OR_CLIMBING_STAIRS_DIFFICULTY: YES
TOILETING_ISSUES: YES

## 2024-01-01 ASSESSMENT — COLUMBIA-SUICIDE SEVERITY RATING SCALE - C-SSRS
2. HAVE YOU ACTUALLY HAD ANY THOUGHTS OF KILLING YOURSELF IN THE PAST MONTH?: NO
6. HAVE YOU EVER DONE ANYTHING, STARTED TO DO ANYTHING, OR PREPARED TO DO ANYTHING TO END YOUR LIFE?: NO
1. IN THE PAST MONTH, HAVE YOU WISHED YOU WERE DEAD OR WISHED YOU COULD GO TO SLEEP AND NOT WAKE UP?: NO

## 2024-01-09 NOTE — TELEPHONE ENCOUNTER
[facility] sent Rx request for the following:      Requested Prescriptions   Pending Prescriptions Disp Refills    levothyroxine (SYNTHROID/LEVOTHROID) 100 MCG tablet [Pharmacy Med Name: LEVOTHYROXINE 100MCG TABLET] 90 tablet 0     Sig: TAKE 1 TABLET (100 MCG) BY MOUTH EVERY DAY       Thyroid Protocol Failed - 1/4/2024 12:58 PM   Last Prescription Date:   11/10/23  Last Fill Qty/Refills:         90, R-0    Last Office Visit:              none    Future Office visit:           none     Never under care at [facility], other than hospital and [facility] clinic.    Adalgisa Rivera RN on 1/9/2024 at 9:46 AM

## 2024-01-20 NOTE — ED TRIAGE NOTES
Pt arrives via ems from [facility] with c/o new onset confusion and hypotension reported by facility (80s/40s). EMS reported Bps in 130s-140s systolic en route.      Triage Assessment (Adult)       Row Name 01/20/24 1643          Triage Assessment    Airway WDL WDL        Respiratory WDL    Respiratory WDL WDL        Skin Circulation/Temperature WDL    Skin Circulation/Temperature WDL WDL        Cardiac WDL    Cardiac WDL WDL        Peripheral/Neurovascular WDL    Peripheral Neurovascular WDL WDL        Cognitive/Neuro/Behavioral WDL    Cognitive/Neuro/Behavioral WDL X        Meri Coma Scale    Best Eye Response 4-->(E4) spontaneous     Best Motor Response 6-->(M6) obeys commands     Best Verbal Response 4-->(V4) confused     Meri Coma Scale Score 14

## 2024-01-20 NOTE — ED PROVIDER NOTES
History     Chief Complaint   Patient presents with    Altered Mental Status     HPI  Skye Baez is a 89 year old female who presented [facility] via EMS with complaints of new onset confusion and hypotension.  The patient is generally oriented and active.  No focal symptoms reported.  The patient talks about just turning 89 but she is now 89 years old (she is really 89 years old).  Denies pain, fever, HA, nausea, cough, shortness of breath.  She is feeling fine.  No further hx available.    Allergies:  Allergies   Allergen Reactions    Iodine Unknown    Celecoxib Hives    Codeine Hives    Penicillins Hives       Problem List:    Patient Active Problem List    Diagnosis Date Noted    Sepsis with encephalopathy without septic shock (H) 11/10/2023     Priority: Medium    Cellulitis of right lower extremity 11/08/2023     Priority: Medium    Pain of toe of right foot 07/12/2023     Priority: Medium        Past Medical History:    History reviewed. No pertinent past medical history.    Past Surgical History:    History reviewed. No pertinent surgical history.    Family History:    History reviewed. No pertinent family history.    Social History:  Marital Status:   [2]  Social History     Tobacco Use    Smoking status: Never     Passive exposure: Past    Smokeless tobacco: Never   Vaping Use    Vaping Use: Never used   Substance Use Topics    Alcohol use: Not Currently    Drug use: Never        Medications:    levofloxacin (LEVAQUIN) 500 MG tablet  allopurinol (ZYLOPRIM) 100 MG tablet  aspirin 81 MG EC tablet  Cholecalciferol (VITAMIN D3) 1.25 MG (40553 UT) TABS  furosemide (LASIX) 20 MG tablet  gabapentin (NEURONTIN) 100 MG capsule  levothyroxine (SYNTHROID/LEVOTHROID) 100 MCG tablet  lisinopril (ZESTRIL) 20 MG tablet  metoprolol succinate ER (TOPROL-XL) 25 MG 24 hr tablet  pantoprazole (PROTONIX) 40 MG EC tablet          Review of Systems   Unable to perform ROS: Mental status change    Patient reports she is feeling fine.    Physical Exam   BP: 135/62  Pulse: 110  Temp: 99.4  F (37.4  C)  Resp: 16  SpO2: 96 %      Physical Exam  Vitals and nursing note reviewed.   Constitutional:       General: She is not in acute distress.     Appearance: She is well-developed. She is not ill-appearing.      Comments: Pleasant, talkative, able to speak complete sentences but has slightly increased respiratory rate.   HENT:      Head: Normocephalic and atraumatic.      Mouth/Throat:      Mouth: Mucous membranes are moist.   Eyes:      Extraocular Movements: Extraocular movements intact.      Pupils: Pupils are equal, round, and reactive to light.   Cardiovascular:      Rate and Rhythm: Normal rate. Rhythm irregular.      Heart sounds: No murmur heard.  Pulmonary:      Breath sounds: Rales present.      Comments: Mildly increased respiratory rate but no audible wheezing.  Rales right base which improved some with deep breaths but did not resolve.  Abdominal:      General: Bowel sounds are normal.      Palpations: Abdomen is soft.      Tenderness: There is no abdominal tenderness.   Musculoskeletal:         General: No tenderness.      Cervical back: Neck supple.   Skin:     General: Skin is warm and dry.      Findings: No rash.      Comments: Skin very dry, flaky.   Neurological:      Mental Status: She is alert. She is disoriented and confused.      Cranial Nerves: No cranial nerve deficit.   Psychiatric:         Mood and Affect: Mood normal.         Speech: Speech normal.         Behavior: Behavior normal.      Comments: Pleasant and interactive.  Speech clear and makes sense but she does not provide the right answers and cannot give any hx of recent occurrences.         ED Course   Patient remained stable throughout her ER course.  Does not require supplemental O2.  Lactic acid elevated to 2.3 so started on a 30 ml/kg bolus with NS and given Levaquin for pneumonia.  When her COVID test returned positive the  second liter was turned down to a maintenance rate.  The patient stated she had COVID about 2 weeks ago but history not reliable.  However son also reported that she had COVID recently so she may not be acutely infected with COVID but may have secondary pneumonia causing her current symptoms.  As she does not require supplemental O2 and Levaquin is bioequivalent po or IV, there are no criteria for admission as she has a safe disposition.              Procedures              Critical Care time:  none     The patient has signs of Severe Sepsis        If one the following conditions is present, a 30 mL/kg bolus is recommended as part of the 6 hour bundle (IBW can be used for BMI >30, or document refusal/contraindication):      1.   Initial hypotension  defined as 2 bps < 90 or map < 65 in the 6hrs before or 3hrs after time zero.     2.  Lactate >4.      The patient has signs of Severe Sepsis as evidenced by:    1. 2 SIRS criteria, AND  2. Suspected infection, AND   3. Organ dysfunction: Lactic Acidosis with value >2.0 and Acute encephalopathy due to sepsis    Time severe sepsis diagnosis confirmed: 1700  01/20/24 as this was the time when Lactate resulted, and the level was > 2.0    3 Hour Severe Sepsis Bundle Completion:    1. Initial Lactic Acid Result:   Recent Labs   Lab Test 01/20/24  1657 11/08/23  0956 07/30/22  1252   LACT 2.3* 1.1 1.3     2. Blood Cultures before Antibiotics: Yes  3. Broad Spectrum Antibiotics Administered:  yes       Anti-infectives (From admission through now)      Start     Dose/Rate Route Frequency Ordered Stop    01/20/24 1835  levofloxacin (LEVAQUIN) infusion 750 mg         750 mg  100 mL/hr over 90 Minutes Intravenous ONCE 01/20/24 1834      01/20/24 0000  levofloxacin (LEVAQUIN) 500 MG tablet         500 mg Oral EVERY 48 HOURS 01/20/24 2001 01/28/24 8634            4. Is initial hypotension present?     No (IV fluid bolus NOT required). IV Fluid volume administered: 2,000 ml (bolus  dose given)                    Severe Sepsis reassessment:  1. Repeat Lactic Acid Level within 6 hours of time zero: To be scheduled.  2. MAP>65 after initial IVF bolus, will continue to monitor fluid status and vital signs              Results for orders placed or performed during the hospital encounter of 01/20/24 (from the past 24 hour(s))   [place] Draw    Narrative    The following orders were created for panel order [place] Draw.  Procedure                               Abnormality         Status                     ---------                               -----------         ------                     Extra Blue Top Tube[[id number]]                              Final result               Extra Red Top Tube[[id number]]                               Final result               Extra Green Top (Lithium...[[id number]]                      Final result               Extra Purple Top Tube[[id number]]                            Final result               Extra Green Top (Lithium...[[id number]]                      Final result                 Please view results for these tests on the individual orders.   Extra Blue Top Tube   Result Value Ref Range    Hold Specimen x    Extra Red Top Tube   Result Value Ref Range    Hold Specimen x    Extra Green Top (Lithium Heparin) Tube   Result Value Ref Range    Hold Specimen x    Extra Purple Top Tube   Result Value Ref Range    Hold Specimen x    Extra Green Top (Lithium Heparin) ON ICE   Result Value Ref Range    Hold Specimen x    CBC with platelets differential    Narrative    The following orders were created for panel order CBC with platelets differential.  Procedure                               Abnormality         Status                     ---------                               -----------         ------                     CBC with platelets and d...[[id number]]  Abnormal            Final result                 Please view results for these tests on the individual orders.    Comprehensive metabolic panel   Result Value Ref Range    Sodium 136 135 - 145 mmol/L    Potassium 4.8 3.4 - 5.3 mmol/L    Carbon Dioxide (CO2) 24 22 - 29 mmol/L    Anion Gap 12 7 - 15 mmol/L    Urea Nitrogen 25.7 (H) 8.0 - 23.0 mg/dL    Creatinine 1.79 (H) 0.51 - 0.95 mg/dL    GFR Estimate 26 (L) >60 mL/min/1.73m2    Calcium 9.2 8.2 - 9.6 mg/dL    Chloride 100 98 - 107 mmol/L    Glucose 128 (H) 70 - 99 mg/dL    Alkaline Phosphatase 522 (H) 40 - 150 U/L    AST 39 0 - 45 U/L    ALT 23 0 - 50 U/L    Protein Total 6.0 (L) 6.4 - 8.3 g/dL    Albumin 3.2 (L) 3.5 - 5.2 g/dL    Bilirubin Total 1.2 <=1.2 mg/dL   Lactic acid whole blood   Result Value Ref Range    Lactic Acid 2.3 (H) 0.7 - 2.0 mmol/L   Procalcitonin   Result Value Ref Range    Procalcitonin 1.07 (H) <0.50 ng/mL   CBC with platelets and differential   Result Value Ref Range    WBC Count 17.2 (H) 4.0 - 11.0 10e3/uL    RBC Count 3.63 (L) 3.80 - 5.20 10e6/uL    Hemoglobin 12.0 11.7 - 15.7 g/dL    Hematocrit 35.3 35.0 - 47.0 %    MCV 97 78 - 100 fL    MCH 33.1 (H) 26.5 - 33.0 pg    MCHC 34.0 31.5 - 36.5 g/dL    RDW 15.4 (H) 10.0 - 15.0 %    Platelet Count 158 150 - 450 10e3/uL    % Neutrophils 89 %    % Lymphocytes 5 %    % Monocytes 5 %    % Eosinophils 0 %    % Basophils 0 %    % Immature Granulocytes 1 %    NRBCs per 100 WBC 0 <1 /100    Absolute Neutrophils 15.4 (H) 1.6 - 8.3 10e3/uL    Absolute Lymphocytes 0.8 0.8 - 5.3 10e3/uL    Absolute Monocytes 0.8 0.0 - 1.3 10e3/uL    Absolute Eosinophils 0.0 0.0 - 0.7 10e3/uL    Absolute Basophils 0.1 0.0 - 0.2 10e3/uL    Absolute Immature Granulocytes 0.1 <=0.4 10e3/uL    Absolute NRBCs 0.0 10e3/uL   XR Chest Port 1 View    Narrative    Exam:  XR CHEST PORT 1 VIEW    HISTORY: fever, encephalopathy.    COMPARISON:  11/8/2023, 7/30/2022    FINDINGS:     The cardiomediastinal contours are unchanged.      There is patchy opacity in the medial right lower lung. No pleural  effusion or pneumothorax.    No acute osseous  abnormality.       Impression    IMPRESSION:      Patchy opacity in the medial right lower lung may be due to  atelectasis/scarring or possibly pneumonia.      NERI CHIANG MD         SYSTEM ID:  [id number]   Symptomatic Influenza A/B, RSV, & SARS-CoV2 PCR (COVID-19) Nose    Specimen: Nose; Swab   Result Value Ref Range    Influenza A PCR Negative Negative    Influenza B PCR Negative Negative    RSV PCR Negative Negative    SARS CoV2 PCR Positive (A) Negative    Narrative    Testing was performed using the Xpert Xpress CoV2/Flu/RSV Assay on the Cepheid GeneXpert Instrument. This test should be ordered for the detection of SARS-CoV-2, influenza, and RSV viruses in individuals who meet clinical and/or epidemiological criteria. Test performance is unknown in asymptomatic patients. This test is for in vitro diagnostic use under the FDA EUA for laboratories certified under CLIA to perform high or moderate complexity testing. This test has not been FDA cleared or approved. A negative result does not rule out the presence of PCR inhibitors in the specimen or target RNA in concentration below the limit of detection for the assay. If only one viral target is positive but coinfection with multiple targets is suspected, the sample should be re-tested with another FDA cleared, approved, or authorized test, if coinfection would change clinical management. This test was validated by the [facility]. These laboratories are certified under the Clinical Laboratory Improvement Amendments of 1988 (CLIA-88) as qualified to perform high complexity laboratory testing.   UA with Microscopic reflex to Culture    Specimen: Urine, Catheter   Result Value Ref Range    Color Urine Light Yellow Colorless, Straw, Light Yellow, Yellow    Appearance Urine Clear Clear    Glucose Urine Negative Negative mg/dL    Bilirubin Urine Negative Negative    Ketones Urine Negative Negative mg/dL    Specific Gravity Urine 1.009 1.000 -  1.030    Blood Urine Negative Negative    pH Urine 5.0 5.0 - 9.0    Protein Albumin Urine Negative Negative mg/dL    Urobilinogen Urine Normal Normal, 2.0 mg/dL    Nitrite Urine Negative Negative    Leukocyte Esterase Urine Negative Negative    Mucus Urine Present (A) None Seen /LPF    RBC Urine 0 <=2 /HPF    WBC Urine <1 <=5 /HPF    Hyaline Casts Urine 11 (H) <=2 /LPF    Narrative    Urine Culture not indicated       Medications   sodium chloride 0.9% BOLUS 1,000 mL (1,000 mLs Intravenous $New Bag 1/20/24 1914)   levofloxacin (LEVAQUIN) infusion 750 mg (750 mg Intravenous $New Bag 1/20/24 1835)   sodium chloride 0.9% BOLUS 1,000 mL (1,000 mLs Intravenous $New Bag 1/20/24 1811)       Assessments & Plan (with Medical Decision Making)     I have reviewed the nursing notes.    I have reviewed the findings, diagnosis, plan and need for follow up with the patient.           Medical Decision Making  The patient's presentation was of moderate complexity (an acute illness with systemic symptoms).    The patient's evaluation involved:  ordering and/or review of 3+ test(s) in this encounter (see separate area of note for details)    The patient's management necessitated high risk (a decision regarding hospitalization).        New Prescriptions    LEVOFLOXACIN (LEVAQUIN) 500 MG TABLET    Take 1 tablet (500 mg) by mouth every 48 hours for 8 days       Final diagnoses:   Severe sepsis with acute organ dysfunction (H) - hyperlactatemia, metabolic encephalopathy   Pneumonia due to 2019 novel coronavirus       1/20/2024   [facility]       Rema Yao MD  01/20/24 2001

## 2024-01-21 NOTE — ED NOTES
Called [facility] for [facility] back to [facility].  Maribell Tanner RN on 1/20/2024 at 8:19 PM

## 2024-01-21 NOTE — ED NOTES
Nurse to nurse given to [facility] at [facility].  [facility] contacted for transport, they are sending a crew.

## 2024-03-15 PROBLEM — K83.09 CHOLANGITIS (H): Status: ACTIVE | Noted: 2024-01-01

## 2024-03-15 PROBLEM — R17 ELEVATED BILIRUBIN: Status: ACTIVE | Noted: 2024-01-01

## 2024-03-15 PROBLEM — N17.9 AKI (ACUTE KIDNEY INJURY) (H): Status: ACTIVE | Noted: 2024-01-01

## 2024-03-15 PROBLEM — E83.42 HYPOMAGNESEMIA: Status: ACTIVE | Noted: 2024-01-01

## 2024-03-15 PROBLEM — D64.9 ANEMIA: Status: ACTIVE | Noted: 2024-01-01

## 2024-03-15 NOTE — DISCHARGE SUMMARY
[facility]    Discharge Summary  Hospitalist    Date of Admission:  3/14/2024  Date of Discharge:  3/15/2024  Discharging Provider: Juan Francisco Howell MD  Date of Service (when I saw the patient): 03/15/24    Discharge Diagnoses   Principal Problem:    Cholangitis (H28) (3/15/2024)  Active Problems:    Hypomagnesemia (3/15/2024)    Anemia (3/15/2024)    DELORIS (acute kidney injury) (H24) (3/15/2024)    Elevated bilirubin (3/15/2024)      History of Present Illness   89yoF with HTN, hypothyroidism, HfpEF, gout, anemia, and known biliary obstruction due to gallstones presented to the ED with weakness and fever. She is confused but has no acute complaints. Family is at bedside. They have known about this biliary obstruction since December and have been advised she is not a candidate for intervention. They would like to pursue comfort measures at this time with the goal of her returning to the care center with hospice.     Hospital Course   She remained comfortable during the stay.  I confirmed with the family they wanted no intervention for the large biliary stones obstructing the duct.  They had discussed this with her primary care physician who said she would not survive an ERCP.  They desired comfort care and hospice.  SW consulted.  Placement and outpatient hospice set up.     Juan Francisco Howell MD    Significant Results and Procedures   See below    Pending Results   These results will be followed up by na  Unresulted Labs Ordered in the Past 30 Days of this Admission       Date and Time Order Name Status Description    3/14/2024  8:45 PM Blood Culture Arm, Left Preliminary     3/14/2024  8:45 PM Blood Culture Arm, Left Preliminary             Code Status   Comfort Care       Primary Care Physician   Kristina Fields    Physical Exam   Temp: (!) 96  F (35.6  C) Temp src: Tympanic BP: (!) 79/45 Pulse: 80   Resp: 12 SpO2: 99 % O2 Device: None (Room air)    There were no vitals filed for  this visit.  Vital Signs with Ranges  Temp:  [96  F (35.6  C)-99.1  F (37.3  C)] 96  F (35.6  C)  Pulse:  [] 80  Resp:  [10-24] 12  BP: ()/(39-92) 79/45  SpO2:  [96 %-100 %] 99 %  No intake/output data recorded.    Gen: arousable to touch  Pulm: Breathing easily    Discharge Disposition   Discharged to assisted living  Condition at discharge: Terminal    Consultations This Hospital Stay   SPIRITUAL HEALTH SERVICES IP CONSULT  PALLIATIVE CARE ADULT IP CONSULT  CARE MANAGEMENT / SOCIAL WORK IP CONSULT  SOCIAL WORK IP CONSULT    Time Spent on this Encounter   I, Juan Francisco Howell MD, personally saw the patient today and spent less than or equal to 30 minutes discharging this patient.    Discharge Orders      Reason for your hospital stay    Comfort cares     Follow-up and recommended labs and tests     Hospice     Activity    Your activity upon discharge: activity as tolerated     Diet    Follow this diet upon discharge: Regular     Discharge Medications   Current Discharge Medication List        START taking these medications    Details   HYDROmorphone (DILAUDID) 2 MG tablet Take 0.5 tablets (1 mg) by mouth every 2 hours as needed for moderate pain, shortness of breath or severe pain  Qty: 90 tablet, Refills: 0    Associated Diagnoses: End of life care      LORazepam (ATIVAN) 1 MG tablet Place 1 tablet (1 mg) under the tongue every 3 hours as needed for anxiety  Qty: 90 tablet, Refills: 0    Associated Diagnoses: End of life care      ondansetron (ZOFRAN ODT) 4 MG ODT tab Take 1 tablet (4 mg) by mouth every 6 hours as needed for nausea or vomiting  Qty: 90 tablet, Refills: 0    Associated Diagnoses: End of life care           CONTINUE these medications which have NOT CHANGED    Details   allopurinol (ZYLOPRIM) 300 MG tablet Take 300 mg by mouth daily      cholestyramine (QUESTRAN) 4 GM/DOSE powder Take 4 g by mouth daily AT 1200      gabapentin (NEURONTIN) 100 MG capsule Take 2 capsules (200 mg) by  mouth nightly as needed for neuropathic pain  Qty: 30 capsule, Refills: 0      levothyroxine (SYNTHROID/LEVOTHROID) 100 MCG tablet Take 1 tablet (100 mcg) by mouth daily  Qty: 90 tablet, Refills: 0    Associated Diagnoses: Hypothyroidism, unspecified type      mirtazapine (REMERON) 7.5 MG tablet Take 7.5 mg by mouth at bedtime      pantoprazole (PROTONIX) 40 MG EC tablet Take 40 mg by mouth every morning           STOP taking these medications       aspirin 81 MG EC tablet Comments:   Reason for Stopping:         Cholecalciferol (VITAMIN D3) 1.25 MG (64100 UT) TABS Comments:   Reason for Stopping:         furosemide (LASIX) 20 MG tablet Comments:   Reason for Stopping:         lisinopril (ZESTRIL) 20 MG tablet Comments:   Reason for Stopping:         metoprolol succinate ER (TOPROL-XL) 25 MG 24 hr tablet Comments:   Reason for Stopping:         traMADol (ULTRAM) 50 MG tablet Comments:   Reason for Stopping:             Allergies   Allergies   Allergen Reactions    Iodine Unknown    Celecoxib Hives    Codeine Hives    Penicillins Hives     Data   Most Recent 3 CBC's:  Recent Labs   Lab Test 03/14/24  2100 01/20/24  1657 11/10/23  0606   WBC 15.9* 17.2* 7.6   HGB 8.6* 12.0 8.7*    97 100   * 158 156      Most Recent 3 BMP's:  Recent Labs   Lab Test 03/14/24 2100 01/20/24  1657 11/10/23  0606    136 139   POTASSIUM 4.5 4.8 3.9   CHLORIDE 99 100 104   CO2 25 24 26   BUN 44.5* 25.7* 22.0   CR 2.50* 1.79* 1.26*   ANIONGAP 12 12 9   AMADO 8.6 9.2 8.2   * 128* 116*     Most Recent 2 LFT's:  Recent Labs   Lab Test 03/14/24 2100 01/20/24  1657   * 39   ALT 50 23   ALKPHOS 729* 522*   BILITOTAL 3.9* 1.2     Most Recent INR's and Anticoagulation Dosing History:  Anticoagulation Dose History          Latest Ref Rng & Units 7/1/2017 12/6/2017   Recent Dosing and Labs   INR 0.80 - 1.20 1.09  1.16      Most Recent 3 Troponin's:  Recent Labs   Lab Test 09/16/20  1632 07/01/17  1501   TROPI <0.015  <0.015  The 99th percentile for upper reference range is 0.045 ug/L.  Troponin values in   the range of 0.045 - 0.120 ug/L may be associated with risks of adverse   clinical events.       Most Recent Cholesterol Panel:No lab results found.  Most Recent 6 Bacteria Isolates From Any Culture (See EPIC Reports for Culture Details):No lab results found.  Most Recent TSH, T4 and A1c Labs:  Recent Labs   Lab Test 11/08/23  0956   TSH 0.11*   T4 2.42*     Results for orders placed or performed during the hospital encounter of 03/14/24   XR Chest Port 1 View    Narrative    PROCEDURE INFORMATION:   Exam: XR Chest   Exam date and time: 3/14/2024 10:22 PM   Age: 89 years old   Clinical indication: Fever and other: Weakness; Additional info: Fever,   weakness     TECHNIQUE:   Imaging protocol: Radiologic exam of the chest.   Views: 1 view.     COMPARISON:   CR XR CHEST PORT 1 VIEW 1/20/2024 6:02 PM     FINDINGS:   Lungs: Chronic appearing diffuse pulmonary interstitial prominence and mild   right lower lobe infiltrate. No definite acute infiltrate.   Pleural spaces: Unremarkable. No pleural effusion. No pneumothorax.   Heart/Mediastinum: Unremarkable. No cardiomegaly.   Bones/joints: Significant diffuse osteopenia. Right shoulder prosthesis in   place. Old, healed left proximal humerus fracture.       Impression    IMPRESSION:   No acute disease.  Stable chronic appearing findings as noted    THIS DOCUMENT HAS BEEN ELECTRONICALLY SIGNED BY MELVIN VAZQUEZ MD   CT Abdomen Pelvis w/o Contrast    Narrative    PROCEDURE INFORMATION:   Exam: CT Abdomen And Pelvis Without Contrast   Exam date and time: 3/14/2024 11:47 PM   Age: 89 years old   Clinical indication: Abnormal findings; Abnormal lab test; Elevated liver   enzymes; Fever; Additional info: Elevated liver enzymes, fever     TECHNIQUE:   Imaging protocol: Computed tomography of the abdomen and pelvis without   contrast.   Radiation optimization: All CT scans at this facility use  at least one of these   dose optimization techniques: automated exposure control; mA and/or kV   adjustment per patient size (includes targeted exams where dose is matched to   clinical indication); or iterative reconstruction.     COMPARISON:   CT ABDOMEN PELVIS W/O CONTRAST 7/30/2022 1:53 PM     FINDINGS:   Lungs: Dense partial consolidative atelectasis of the right lower lobe.   Pleural spaces: Partially visualized right pleural effusion.     Liver: Normal. No mass.   Gallbladder and bile ducts: Significant diffuse biliary ductal dilation. Two   distinct hyperdense lesions compatible with stones in the distal common duct,   each measuring 10-12 mm in diameter. A few other more ill-defined areas of   hyperdensity in the mid to upper common duct may represent stones as well.   Gallbladder is surgically absent.   Pancreas: Normal. No ductal dilation.   Spleen: Normal. No splenomegaly.   Adrenal glands: Normal. No mass.   Kidneys and ureters: Moderate right renal atrophy. Left kidney appears normal.   No evidence of urolithiasis or hydronephrosis.   Stomach and bowel: Moderate sigmoid diverticulosis. No bowel wall thickening or   evidence of bowel obstruction.   Appendix: No evidence of appendicitis.     Intraperitoneal space: Unremarkable. No free air. No significant fluid   collection.   Vasculature: Dense atherosclerotic calcification throughout the aorta and iliac   arteries. No evidence of aneurysm.   Lymph nodes: Unremarkable. No enlarged lymph nodes.   Urinary bladder: Unremarkable as visualized.   Reproductive: Unremarkable as visualized.   Bones/joints: Age-indeterminate moderate compression fracture of L2 has   occurred since the prior study of 07/30/2022. Bilateral spondylolysis and grade   1 anterolisthesis of L5. Multilevel degenerative disc change and facet   arthropathy, most severe at the lumbosacral junction.   Soft tissues: Unremarkable.       Impression    IMPRESSION:   1. Significant diffuse  biliary ductal dilation apparently due to multiple large   gallstones in the distal common bile duct.  Biliary neoplasm and cholangitis   not excluded.  2.  Right pleural effusion and right lower lobe consolidative atelectasis   3. Age-indeterminate compression fracture of L2, new since 2022.  Other   significant chronic osseous and atherosclerotic changes as noted.    THIS DOCUMENT HAS BEEN ELECTRONICALLY SIGNED BY MELVIN VAZQUEZ MD

## 2024-03-15 NOTE — PROGRESS NOTES
Chart reviewed per MST screen: unsure about wt loss and decreased appetite. Pt is from [facility]. Currently on comfort cares and potentially discharge home with hospice services. Will monitor diet tolerance, plan to provide foods/fluids as desired and tolerated. Follow up as needed.   Diet: regular   Intakes: as desired and tolerated for comfort   Wt Readings from Last 10 Encounters:   11/10/23 60.8 kg (134 lb)   07/20/23 62.6 kg (138 lb)   07/12/23 62.3 kg (137 lb 6.4 oz)   07/12/23 62.3 kg (137 lb 6.4 oz)   07/30/22 80.3 kg (177 lb)   07/23/22 80.3 kg (177 lb 1.6 oz)   06/10/21 89.8 kg (198 lb)      Malnutrition Criteria:  (Need to have 2 indicators to qualify recommendation)  Energy Intake:  Chronic Moderate: < 75% of estimated energy requirement for >/= 1 month  Interpretation of Weight Loss:  No significant weight loss  Physical Findings:  Chronic Body Fat Loss:  moderate and Chronic Muscle Mass Loss:  moderate  Reduced  Strength:  Not Measured but likely reduced with current illness/weakness    Recommended Nutrition Diagnosis:   Severe Malnutrition in the context of chronic illness - based on AND/ASPEN Clinical Characterstics of Malnutrition May 2012    Yeimy Sy RD on 3/15/2024 at 9:18 AM

## 2024-03-15 NOTE — PROGRESS NOTES
:     Patient comes from [facility] and is here on [facility]. Anticipated to discharge back to [facility] on hospice. Spoke with [facility] and they are able to accept patient back today on hospice. Called and spoke with patient's son Vinnie. Vinnie stated they would go with [facility] but were under the impression that patient would stay over the weekend at [facility]. Made MD aware.     Sent a referral to [facility]. Spoke with Abbey at [facility] and provided information on the referral.     ALBAN Sanchez on 3/15/2024 at 10:01 AM    [facility] is still screening patient. Made them aware that patient is anticipated to discharge home today.    Called [facility] and they stated patient already has DME but would need to be sent home with comfort meds if she discharges prior to [facility] accepting/admitting. They are ready to take patient back today.     ALBAN Sanchez on 3/15/2024 at 11:50 AM    [facility] isn't able to admit patient until Monday. Called patient's son and he is okay with switching to [facility] as long as they can admit patient tomorrow. Patient's other daughter will be coming in from out of town and can be to [facility] at 11:00 tomorrow. Called Aydee at [facility] and sent a referral. They are able to have a nurse to [facility] tomorrow at 11:00 to admit patient.     Spoke with pharmacist and they will be able to sent comfort meds for the weekend over to the [facility] pharmacy for family to .     Called and updated Didi at [facility].     Called and canceled the referral with [facility].     ALBAN Sanchez on 3/15/2024 at 2:02 PM     Faxed discharge summary to [facility].     Spoke with patient's family in the room. They are ready for her to discharge. They will  patient's medication from [facility] before they leave.     Called [facility] and they are looking at their schedule and will call back with a time.     ALBAN Sanchez on 3/15/2024 at 3:39  PM    [facility] will be here around 16:00 to transport patient.     No further needs from  at this time.     ALBAN Sanchez on 3/15/2024 at 3:48 PM

## 2024-03-15 NOTE — ED TRIAGE NOTES
Pt arrives [facility] for concern for UTI, increasing generalized weakness, concern for dehydration. Baseline uses walker, no falls reported. A fib noted en route. Pt arrives alert, conversant but somewhat confused, poor historian.     /63   Pulse 83   Temp 99.1  F (37.3  C) (Tympanic)   Resp 18   LMP  (LMP Unknown)   SpO2 98%        Triage Assessment (Adult)       Row Name 03/14/24 2022          Triage Assessment    Airway WDL WDL        Respiratory WDL    Respiratory WDL WDL        Skin Circulation/Temperature WDL    Skin Circulation/Temperature WDL WDL        Cardiac WDL    Cardiac WDL WDL        Peripheral/Neurovascular WDL    Peripheral Neurovascular WDL WDL        Cognitive/Neuro/Behavioral WDL    Cognitive/Neuro/Behavioral WDL X     Level of Consciousness confused     Arousal Level opens eyes spontaneously     Speech clear        Pupils (CN II)    Pupil PERRLA yes

## 2024-03-15 NOTE — ED PROVIDER NOTES
History     Chief Complaint   Patient presents with    Generalized Weakness     The history is provided by the patient, medical records and the EMS personnel.     Skye Baez is a 89 year old female PMH a-fib, hypothyroidism, anemia,  who presents to the emergency department today [facility]. Per EMS, patient is coming in for evaluation of generalized weakness, dehydration, fever, possible UTI. Patient herself states she didn't want to come in, she doesn't feel sick. Patient tells me she is not in pain.   EMS reported temp of 101.1 en route.    Recently treated for pneumonia 1/20/24  Admitted 11/8/23 for celluitis, UTI (culture klebsiella) sepsis  History cholecystectomy     Allergies:  Allergies   Allergen Reactions    Iodine Unknown    Celecoxib Hives    Codeine Hives    Penicillins Hives       Problem List:    Patient Active Problem List    Diagnosis Date Noted    Sepsis with encephalopathy without septic shock (H) 11/10/2023     Priority: Medium    Cellulitis of right lower extremity 11/08/2023     Priority: Medium    Pain of toe of right foot 07/12/2023     Priority: Medium        Past Medical History:    No past medical history on file.    Past Surgical History:    No past surgical history on file.    Family History:    No family history on file.    Social History:  Marital Status:   [2]  Social History     Tobacco Use    Smoking status: Never     Passive exposure: Past    Smokeless tobacco: Never   Vaping Use    Vaping Use: Never used   Substance Use Topics    Alcohol use: Not Currently    Drug use: Never        Medications:    allopurinol (ZYLOPRIM) 100 MG tablet  aspirin 81 MG EC tablet  Cholecalciferol (VITAMIN D3) 1.25 MG (73897 UT) TABS  furosemide (LASIX) 20 MG tablet  gabapentin (NEURONTIN) 100 MG capsule  levothyroxine (SYNTHROID/LEVOTHROID) 100 MCG tablet  lisinopril (ZESTRIL) 20 MG tablet  metoprolol succinate ER (TOPROL-XL) 25 MG 24 hr  tablet  pantoprazole (PROTONIX) 40 MG EC tablet      Review of Systems   Unable to perform ROS: Dementia       Physical Exam   BP: 104/63  Pulse: 83  Temp: 99.1  F (37.3  C)  Resp: 18  SpO2: 98 %      Physical Exam  Vitals and nursing note reviewed.   Constitutional:       General: She is not in acute distress.     Appearance: She is ill-appearing. She is not toxic-appearing.   HENT:      Head: Atraumatic.      Right Ear: Tympanic membrane normal.      Left Ear: Tympanic membrane normal.      Nose: Nose normal.      Mouth/Throat:      Mouth: Mucous membranes are moist.   Cardiovascular:      Rate and Rhythm: Normal rate. Rhythm irregular.   Pulmonary:      Effort: Pulmonary effort is normal. No respiratory distress.      Comments: Fine crackles right lower lobe  Abdominal:      General: Bowel sounds are normal.      Palpations: Abdomen is soft.      Tenderness: There is no abdominal tenderness.   Musculoskeletal:      Cervical back: Neck supple.   Skin:     General: Skin is dry.      Capillary Refill: Capillary refill takes less than 2 seconds.      Coloration: Skin is jaundiced and pale.      Comments: Lower extremities are    Neurological:      Mental Status: She is alert. Mental status is at baseline.   Psychiatric:         Mood and Affect: Mood normal.         Behavior: Behavior is cooperative.         Cognition and Memory: Memory is impaired.         ED Course               EKG Interpretation:      Interpreted by REINALDO Pulido CNP  Time reviewed: 2037  Symptoms at time of EKG: None   Rhythm: atrial fibrillation - controlled  Rate: 80's  ST Segments/ T Waves: No ST-T wave changes  Comparison to prior: rate controlled compared to EKG 7/23/23  Clinical Impression: a fib, anteroseptal infarct, age undetermined   Pending  EKG over read by internal medicine        Results for orders placed or performed during the hospital encounter of 03/14/24 (from the past 24 hour(s))   CBC with platelets differential     Narrative    The following orders were created for panel order CBC with platelets differential.  Procedure                               Abnormality         Status                     ---------                               -----------         ------                     CBC with platelets and d...[[id number]]  Abnormal            Edited Result - FINAL        Please view results for these tests on the individual orders.   Comprehensive metabolic panel   Result Value Ref Range    Sodium 136 135 - 145 mmol/L    Potassium 4.5 3.4 - 5.3 mmol/L    Carbon Dioxide (CO2) 25 22 - 29 mmol/L    Anion Gap 12 7 - 15 mmol/L    Urea Nitrogen 44.5 (H) 8.0 - 23.0 mg/dL    Creatinine 2.50 (H) 0.51 - 0.95 mg/dL    GFR Estimate 17 (L) >60 mL/min/1.73m2    Calcium 8.6 8.2 - 9.6 mg/dL    Chloride 99 98 - 107 mmol/L    Glucose 135 (H) 70 - 99 mg/dL    Alkaline Phosphatase 729 (H) 40 - 150 U/L     (H) 0 - 45 U/L    ALT 50 0 - 50 U/L    Protein Total 4.9 (L) 6.4 - 8.3 g/dL    Albumin 2.6 (L) 3.5 - 5.2 g/dL    Bilirubin Total 3.9 (H) <=1.2 mg/dL   Lactic acid whole blood   Result Value Ref Range    Lactic Acid 1.8 0.7 - 2.0 mmol/L   Troponin T, High Sensitivity   Result Value Ref Range    Troponin T, High Sensitivity 82 (H) <=14 ng/L   Magnesium   Result Value Ref Range    Magnesium 1.5 (L) 1.7 - 2.3 mg/dL   CBC with platelets and differential   Result Value Ref Range    WBC Count 15.9 (H) 4.0 - 11.0 10e3/uL    RBC Count 2.63 (L) 3.80 - 5.20 10e6/uL    Hemoglobin 8.6 (L) 11.7 - 15.7 g/dL    Hematocrit 26.3 (L) 35.0 - 47.0 %     78 - 100 fL    MCH 32.7 26.5 - 33.0 pg    MCHC 32.7 31.5 - 36.5 g/dL    RDW 16.7 (H) 10.0 - 15.0 %    Platelet Count 111 (L) 150 - 450 10e3/uL    % Neutrophils 91 %    % Lymphocytes 5 %    % Monocytes 3 %    % Eosinophils 0 %    % Basophils 0 %    % Immature Granulocytes 1 %    NRBCs per 100 WBC 0 <1 /100    Absolute Neutrophils 14.4 (H) 1.6 - 8.3 10e3/uL    Absolute Lymphocytes 0.8 0.8 - 5.3 10e3/uL     Absolute Monocytes 0.5 0.0 - 1.3 10e3/uL    Absolute Eosinophils 0.0 0.0 - 0.7 10e3/uL    Absolute Basophils 0.0 0.0 - 0.2 10e3/uL    Absolute Immature Granulocytes 0.1 <=0.4 10e3/uL    Absolute NRBCs 0.0 10e3/uL   Extra Tube    Narrative    The following orders were created for panel order Extra Tube.  Procedure                               Abnormality         Status                     ---------                               -----------         ------                     Extra Blue Top Tube[[id number]]                              Edited                     Extra Serum Separator Tu...[[id number]]                      Edited                       Please view results for these tests on the individual orders.   Extra Blue Top Tube   Result Value Ref Range    Hold Specimen x    Extra Serum Separator Tube (SST)   Result Value Ref Range    Hold Specimen x    Lipase   Result Value Ref Range    Lipase 14 13 - 60 U/L   Symptomatic Influenza A/B, RSV, & SARS-CoV2 PCR (COVID-19) Nose    Specimen: Nose; Swab   Result Value Ref Range    Influenza A PCR Negative Negative    Influenza B PCR Negative Negative    RSV PCR Negative Negative    SARS CoV2 PCR Negative Negative    Narrative    Testing was performed using the Xpert Xpress CoV2/Flu/RSV Assay on the [employer] GeneXpert Instrument. This test should be ordered for the detection of SARS-CoV-2, influenza, and RSV viruses in individuals who meet clinical and/or epidemiological criteria. Test performance is unknown in asymptomatic patients. This test is for in vitro diagnostic use under the FDA EUA for laboratories certified under CLIA to perform high or moderate complexity testing. This test has not been FDA cleared or approved. A negative result does not rule out the presence of PCR inhibitors in the specimen or target RNA in concentration below the limit of detection for the assay. If only one viral target is positive but coinfection with multiple targets is suspected, the  sample should be re-tested with another FDA cleared, approved, or authorized test, if coinfection would change clinical management. This test was validated by the [facility] [employer]. These laboratories are certified under the Clinical Laboratory Improvement Amendments of 1988 (CLIA-88) as qualified to perform high complexity laboratory testing.   UA with Microscopic reflex to Culture    Specimen: Urine, Catheter   Result Value Ref Range    Color Urine Yellow Colorless, Straw, Light Yellow, Yellow    Appearance Urine Clear Clear    Glucose Urine Negative Negative mg/dL    Bilirubin Urine Negative Negative    Ketones Urine Negative Negative mg/dL    Specific Gravity Urine 1.013 1.000 - 1.030    Blood Urine Negative Negative    pH Urine 5.0 5.0 - 9.0    Protein Albumin Urine Negative Negative mg/dL    Urobilinogen Urine 6.0 (A) Normal, 2.0 mg/dL    Nitrite Urine Negative Negative    Leukocyte Esterase Urine Negative Negative    Mucus Urine Present (A) None Seen /LPF    RBC Urine <1 <=2 /HPF    WBC Urine 1 <=5 /HPF    Hyaline Casts Urine 5 (H) <=2 /LPF    Narrative    Urine Culture not indicated   XR Chest Port 1 View    Narrative    PROCEDURE INFORMATION:   Exam: XR Chest   Exam date and time: 3/14/2024 10:22 PM   Age: 89 years old   Clinical indication: Fever and other: Weakness; Additional info: Fever,   weakness     TECHNIQUE:   Imaging protocol: Radiologic exam of the chest.   Views: 1 view.     COMPARISON:   CR XR CHEST PORT 1 VIEW 1/20/2024 6:02 PM     FINDINGS:   Lungs: Chronic appearing diffuse pulmonary interstitial prominence and mild   right lower lobe infiltrate. No definite acute infiltrate.   Pleural spaces: Unremarkable. No pleural effusion. No pneumothorax.   Heart/Mediastinum: Unremarkable. No cardiomegaly.   Bones/joints: Significant diffuse osteopenia. Right shoulder prosthesis in   place. Old, healed left proximal humerus fracture.       Impression    IMPRESSION:   No acute disease.   Stable chronic appearing findings as noted    THIS DOCUMENT HAS BEEN ELECTRONICALLY SIGNED BY MELVIN VAZQUEZ MD   Troponin T, High Sensitivity   Result Value Ref Range    Troponin T, High Sensitivity 60 (H) <=14 ng/L   CT Abdomen Pelvis w/o Contrast    Narrative    PROCEDURE INFORMATION:   Exam: CT Abdomen And Pelvis Without Contrast   Exam date and time: 3/14/2024 11:47 PM   Age: 89 years old   Clinical indication: Abnormal findings; Abnormal lab test; Elevated liver   enzymes; Fever; Additional info: Elevated liver enzymes, fever     TECHNIQUE:   Imaging protocol: Computed tomography of the abdomen and pelvis without   contrast.   Radiation optimization: All CT scans at this facility use at least one of these   dose optimization techniques: automated exposure control; mA and/or kV   adjustment per patient size (includes targeted exams where dose is matched to   clinical indication); or iterative reconstruction.     COMPARISON:   CT ABDOMEN PELVIS W/O CONTRAST 7/30/2022 1:53 PM     FINDINGS:   Lungs: Dense partial consolidative atelectasis of the right lower lobe.   Pleural spaces: Partially visualized right pleural effusion.     Liver: Normal. No mass.   Gallbladder and bile ducts: Significant diffuse biliary ductal dilation. Two   distinct hyperdense lesions compatible with stones in the distal common duct,   each measuring 10-12 mm in diameter. A few other more ill-defined areas of   hyperdensity in the mid to upper common duct may represent stones as well.   Gallbladder is surgically absent.   Pancreas: Normal. No ductal dilation.   Spleen: Normal. No splenomegaly.   Adrenal glands: Normal. No mass.   Kidneys and ureters: Moderate right renal atrophy. Left kidney appears normal.   No evidence of urolithiasis or hydronephrosis.   Stomach and bowel: Moderate sigmoid diverticulosis. No bowel wall thickening or   evidence of bowel obstruction.   Appendix: No evidence of appendicitis.     Intraperitoneal space:  Unremarkable. No free air. No significant fluid   collection.   Vasculature: Dense atherosclerotic calcification throughout the aorta and iliac   arteries. No evidence of aneurysm.   Lymph nodes: Unremarkable. No enlarged lymph nodes.   Urinary bladder: Unremarkable as visualized.   Reproductive: Unremarkable as visualized.   Bones/joints: Age-indeterminate moderate compression fracture of L2 has   occurred since the prior study of 07/30/2022. Bilateral spondylolysis and grade   1 anterolisthesis of L5. Multilevel degenerative disc change and facet   arthropathy, most severe at the lumbosacral junction.   Soft tissues: Unremarkable.       Impression    IMPRESSION:   1. Significant diffuse biliary ductal dilation apparently due to multiple large   gallstones in the distal common bile duct.  Biliary neoplasm and cholangitis   not excluded.  2.  Right pleural effusion and right lower lobe consolidative atelectasis   3. Age-indeterminate compression fracture of L2, new since 2022.  Other   significant chronic osseous and atherosclerotic changes as noted.    THIS DOCUMENT HAS BEEN ELECTRONICALLY SIGNED BY MELVIN VAZQUEZ MD       Medications   sodium chloride 0.9% BOLUS 500 mL (500 mLs Intravenous $New Bag 3/14/24 2220)   magnesium sulfate 2 g in 50 mL sterile water intermittent infusion (2 g Intravenous $New Bag 3/14/24 2251)   sodium chloride 0.9% BOLUS 500 mL (500 mLs Intravenous $New Bag 3/14/24 2255)       Assessments & Plan (with Medical Decision Making)  Temp: 99.1  F (37.3  C) Temp src: Tympanic BP: 98/52 Pulse: 71   Resp: 15 SpO2: 98 % O2 Device: None (Room air)    no hypoxia. Vitals stable  Physical exam: Patient is alert to self, pleasant. She is non-distressed. Lung sounds are diminished, poor inspiratory effort noted, fine crackles right lower lobe. Skin is warm, dry. HR irregular. Abdomen soft, non-tender.   Patient is a poor historian. She tells me she is not having pain. She tells me she is comfortable.  "  Differential Diagnosis includes but not limited to: bacterial vs viral infection for fever, sepsis, generalized weakness r/o ACS, dehydration, infection, evaluate electrolytes, anemia   Labs: CBC: WBC 15.9, hemoglobin 8.6, hematocrit 26.3, platelet 111 CMP: BUN 44.5, Creatinine 2.50 GFR 17, Alk phos 729, , Protein 4.9, Albumin 2.6, total bilirubin 3.9  Lipase: 14- normal  Ma.5- replaced   lactic: 1.8   troponin 82 repeat: 60  urinalysis: negative for infection,  Flu/covid/rsv PCR:  negative   Blood cultures pending.   Radiology results interpreted by radiologist:   Chest xray: Chronic appearing diffuse pulmonary interstitial prominence and mild  right lower lobe infiltrate. No definite acute infiltrate.  No acute disease  Elevated liver enzymes, bilirubin, fever, wbc: CT abdomen and pelvis without contrast due to DELORIS: pending results  EKG: a-fib, no ST changes  Meds: IVF bolus for DELORIS, magnesium replacement, suspect intraabdominal infection, IV cefepime ordered   11:42 PM: Son at bedside, David and his wife Daphne. I discussed patient's elevated white count, liver enzymes, bilirubin, troponin, and worsening renal function tests. They informed me that patient was seen in 2023 and they were told that she had a \"stone in her liver\" that was inoperable. They have been told that Catarina likely wouldn't survive anesthesia. I am unable to find these medical records, family states that they believe this was at  the ultrasound and diagnosis was from [facility].   I discussed that there is a possibility that Catarina has a blockage/inflammation/infection of her biliary duct. I discussed evaluation for this is limited here, a CT scan could evaluate the liver and pancreas further;  and MRCP could further evaluate the biliary duct- which could be done during the day. She may need a ERCP, which is not available here, and involves anesthesia. Son and daughter in law realize that Catarina would likely not survive " surgery if this was needed for her treatment.  After shared decision making and discussion, They are accepting to getting further imaging with an abdominal CT scan tonight and discussing options following the CT to making Catarina more comfortable, including comfort care measures. Catarina is confused, which tonight limits her decision making, though she tells me she doesn't feel bad and she didn't want to come in tonight and she is comfortable.   1150 PM : Patient care signed out to Dr. Grimm at end of shift, who will resume care.      I have reviewed the nursing notes.    I have reviewed the findings, diagnosis, plan and need for follow up with the patient.    New Prescriptions    No medications on file       Final diagnoses:   Anemia   DELORIS (acute kidney injury) (H24)   Elevated bilirubin   Hypomagnesemia       3/14/2024   [facility] Vivienne, APRN CNP  03/15/24 2853

## 2024-03-15 NOTE — PHARMACY - DISCHARGE MEDICATION RECONCILIATION
[facility]  Part of [address] [address]    March 15, 2024    Dear Pharmacist,    Your customer, Skye Rush Sasha, born on 7/3/1927, was recently discharged from [facility].  We have updated her medication list and want to alert you to the following:       Review of your medicines        START taking        Dose / Directions   HYDROmorphone 2 MG tablet  Commonly known as: DILAUDID  Used for: End of life care      Dose: 1 mg  Take 0.5 tablets (1 mg) by mouth every 2 hours as needed for moderate pain, shortness of breath or severe pain  Quantity: 90 tablet  Refills: 0     LORazepam 1 MG tablet  Commonly known as: ATIVAN  Used for: End of life care      Dose: 1 mg  Place 1 tablet (1 mg) under the tongue every 3 hours as needed for anxiety  Quantity: 90 tablet  Refills: 0     ondansetron 4 MG ODT tab  Commonly known as: ZOFRAN ODT  Used for: End of life care      Dose: 4 mg  Take 1 tablet (4 mg) by mouth every 6 hours as needed for nausea or vomiting  Quantity: 90 tablet  Refills: 0            CONTINUE these medicines which have NOT CHANGED        Dose / Directions   allopurinol 300 MG tablet  Commonly known as: ZYLOPRIM      Dose: 300 mg  Take 300 mg by mouth daily  Refills: 0     cholestyramine 4 GM/DOSE powder  Commonly known as: QUESTRAN      Dose: 4 g  Take 4 g by mouth daily AT 1200  Refills: 0     gabapentin 100 MG capsule  Commonly known as: NEURONTIN      Dose: 200 mg  Take 2 capsules (200 mg) by mouth nightly as needed for neuropathic pain  Quantity: 30 capsule  Refills: 0     levothyroxine 100 MCG tablet  Commonly known as: SYNTHROID/LEVOTHROID  Used for: Hypothyroidism, unspecified type      Dose: 100 mcg  Take 1 tablet (100 mcg) by mouth daily  Quantity: 90 tablet  Refills: 0     mirtazapine 7.5 MG tablet  Commonly known as: REMERON      Dose: 7.5 mg  Take 7.5 mg by mouth at bedtime  Refills: 0     pantoprazole 40 MG  EC tablet  Commonly known as: PROTONIX      Dose: 40 mg  Take 40 mg by mouth every morning  Refills: 0            STOP taking      aspirin 81 MG EC tablet        furosemide 20 MG tablet  Commonly known as: LASIX        lisinopril 20 MG tablet  Commonly known as: ZESTRIL        metoprolol succinate ER 25 MG 24 hr tablet  Commonly known as: TOPROL XL        traMADol 50 MG tablet  Commonly known as: ULTRAM        Vitamin D3 1.25 MG (33401 UT) Tabs  Generic drug: Cholecalciferol                  Where to get your medicines        These medications were sent to [facility] - [place], [place] - [address]  [address], [place] [address]      Phone: [phone]   HYDROmorphone 2 MG tablet  LORazepam 1 MG tablet  ondansetron 4 MG ODT tab         We also reviewed Skye Baez's allergy list and updated it as needed:  Allergies: Iodine, Celecoxib, Codeine, and Penicillins    Thank you for continuing to care for Skye Baez.  We look forward to working together with you in the future.    Sincerely,  Fabiola Curtis, [facility] and [facility]

## 2024-03-15 NOTE — ED PROVIDER NOTES
CONTINUATION OF CARE  Patient signed out to me by Vivienne Chan, to follow-up on patient CT.  CT shows findings concerning for occult malignancy and probable cholangitis.  Discussed with family, they are interested in transitioning to comfort care measures at this point.  Discussed with Dr. Poole who is in agreement with admission.  Discussed with house supervisor as well.     (Z51.5) End of life care  (primary encounter diagnosis)  (N17.9) DELORIS (acute kidney injury) (H24)    (R17) Elevated bilirubin      (K83.09) Cholangitis (H28)      Basilio Grimm MD  03/15/24 0254       Basilio Grimm MD  03/19/24 2761

## 2024-03-15 NOTE — PLAN OF CARE
Patient on comfort care. Respirations 12. Pressure ulcer on coccyx, mepilex in place. [id number]. Plan is for patient to discharge to [facility].     Renea Neff RN .......  3/15/2024  4:12 PM

## 2024-03-15 NOTE — ED NOTES
Straight catheter procedure note. Anticipatory guidance provided, pt agreeable to procedure. Straight cath with female cath kit performed via sterile technique, no complications. Urine specimen collected. Olga cares performed, clean pad applied. Mepilex dressing to tailbone by provider. Pt resting comfortably.

## 2024-03-15 NOTE — PHARMACY-ADMISSION MEDICATION HISTORY
Pharmacist Admission Medication History    Admission medication history is complete. The information provided in this note is only as accurate as the sources available at the time of the update.    Information Source(s): Facility (U/[facility]/) medication list/MAR via  [facility] MAR , sure scripts    Pertinent Information: Lisinopril dose was recently decreased from 20 mg to 10 mg daily on 3/9/24; patient has used gabapentin 100 mg x2 doses this past month for pain    Changes made to PTA medication list:  Added: tramadol PRN (has used twice this past month), cholestyramine 4g daily (takes approx. 1/2 of the time), mirtazapine at bedtime (refused 3/13 dose)  Deleted: None  Changed: allopurinol from 200mg to 300mg daily; lisinopril from 20mg to 10mg daily on 3/9    Allergies reviewed with patient and updates made in EHR:  not listed on MAR - unable to confirm at this time    Medication History Completed By: Lashell Stapleton [facility] 3/15/2024 8:52 AM    PTA Med List   Medication Sig Last Dose    allopurinol (ZYLOPRIM) 300 MG tablet Take 300 mg by mouth daily 3/14/2024 at 0800    aspirin 81 MG EC tablet Take 81 mg by mouth daily 3/14/2024 at 0800    Cholecalciferol (VITAMIN D3) 1.25 MG (36185 UT) TABS TAKE 1 CAPSULE (125MCG) BY MOUTH EVERY DAY 3/14/2024 at 0800    cholestyramine (QUESTRAN) 4 GM/DOSE powder Take 4 g by mouth daily AT 1200 3/14/2024 at 1200    furosemide (LASIX) 20 MG tablet Take 20 mg by mouth daily 3/14/2024 at 0800    gabapentin (NEURONTIN) 100 MG capsule Take 2 capsules (200 mg) by mouth nightly as needed for neuropathic pain     levothyroxine (SYNTHROID/LEVOTHROID) 100 MCG tablet Take 1 tablet (100 mcg) by mouth daily 3/14/2024 at 0700    lisinopril (ZESTRIL) 20 MG tablet Take 10 mg by mouth daily 3/14/2024 at 0800    metoprolol succinate ER (TOPROL-XL) 25 MG 24 hr tablet Take 25 mg by mouth daily 3/14/2024 at 0800    mirtazapine (REMERON) 7.5 MG tablet Take 7.5 mg by mouth at bedtime 3/12/2024 at  [facility]-REFUSED 3/13    pantoprazole (PROTONIX) 40 MG EC tablet Take 40 mg by mouth every morning 3/14/2024 at 0700    traMADol (ULTRAM) 50 MG tablet Take 50 mg by mouth every 6 hours as needed for pain 3/11/2024 at 0930

## 2024-03-15 NOTE — PROGRESS NOTES
Interdisciplinary Discharge Planning Note    Anticipated Discharge Date: 3/16    Anticipated Discharge Location:     Clinical Needs Before Discharge:  stable oxygen requirement    Treatment Needs After Discharge:  home oxygen and rehab (PT, OT, ST)    Potential Barriers to Discharge: None identified at this time    ALBAN Sanchez  3/15/2024,  11:54 AM

## 2024-03-15 NOTE — PHARMACY - DISCHARGE MEDICATION RECONCILIATION AND EDUCATION
Pharmacy: Discharge Counseling and Medication Reconciliation    Skye Rush Sasha  [address]  [place] [address]  [phone] (home)   89 year old female  PCP:Kristina Fields    Allergies   Allergen Reactions    Iodine Unknown    Celecoxib Hives    Codeine Hives    Penicillins Hives       Discharge Counseling:    Pharmacist met with patient (and/or family) today to review the medication portion of the After Visit Summary (with an emphasis on NEW medications) and to address patient's questions/concerns.     Summary of Education:   Met with patient's family at time of discharge to review all changes in medications including new hydromorphone, lorazepam, and ondansetron and discontinued aspirin, furosemide, lisinopril, metoprolol, tramadol, vitamin D3. Discussed indications, directions for use, and possible side effects. Patient's family asked a few questions and all concerns were addressed.    [facility]ursing staff at [facility] will manage medications for patient until hospice can admit on 3/16/23.     Materials Provided:   MedCounselor sheets printed from Clinical Pharmacology on: hydromorphone, lorazepam, and ondansetron    Discharge Medication Reconciliation:    Fabiola Curtis [facility] has reviewed the patient's discharge medication orders and has compared them to the inpatient medication administration record and to what the patient was taking prior to admission- any discrepancies have been resolved.     It has been determined that the patient has an adequate supply of medications available or which can be obtained from the patient's preferred pharmacy, which has been confirmed as: [facility], medications will be picked up by family and brought to [facility]. Patient will be admitted to [facility] 3/16/23 and all medications will be managed by hospice at that time.     Thank you for the consult.     Fabiola Curtis RPH ....................  3/15/2024   3:19 PM

## 2024-03-15 NOTE — PROGRESS NOTES
[facility] ADMISSION NOTE    Patient admitted to room 355 at approximately 0330 via bed from emergency room. Patient was accompanied by transport tech.     Verbal SBAR report received from GONZALO Wallace  prior to patient arrival.     Patient trasferred to bed via slide method. Patient alert and oriented X 1. The patient is not having any pain.  . Admission vital signs: Blood pressure (!) 79/45, pulse 80, temperature 99.1  F (37.3  C), temperature source Tympanic, resp. rate 10, SpO2 99%, not currently breastfeeding. Patient was oriented to plan of care, call light, bed controls, tv, telephone, bathroom, and visiting hours.     Risk Assessment    The following safety risks were identified during admission: fall and skin. Yellow risk band applied: YES.     Skin Initial Assessment    This writer admitted this patient and completed a full skin assessment and Lucien score in the Adult PCS flowsheet. Appropriate interventions initiated as needed.              Education    Patient has a [place] to Observation order: No  Observation education completed and documented: N/A      Nadege Narvaez RN

## 2024-03-15 NOTE — PROGRESS NOTES
Interdisciplinary Discharge Planning Note    Anticipated Discharge Date: 3/5    Anticipated Discharge Location: [facility]     Clinical Needs Before Discharge:  adequate comfort achieved    Treatment Needs After Discharge:  hospice    Potential Barriers to Discharge: None identified     ALBAN Sanchez  3/15/2024,  11:51 AM

## 2024-03-15 NOTE — ED NOTES
Call from lab. Blood culture pos. Patient was discharge on comfort measures. Nothing further to do at this time.      Jane Marcus,   03/15/24 6125

## 2024-03-15 NOTE — PROVIDER NOTIFICATION
03/15/24 0347   Valuables   Patient Belongings remains with patient   Patient Belongings Remaining with Patient other (see comments)  (clothing, glasses, watch)   Did you bring any home meds/supplements to the hospital?  No     A               Admission:  I am responsible for any personal items that are not sent to the safe or pharmacy.  [facility] is not responsible for loss, theft or damage of any property in my possession.    Signature:  _________________________________ Date: _______  Time: _____                                              Staff Signature:  ____________________________ Date: ________  Time: _____      2nd Staff person, if patient is unable/unwilling to sign:    Signature: ________________________________ Date: ________  Time: _____     Discharge:  [facility] has returned all of my personal belongings:    Signature: _________________________________ Date: ________  Time: _____                                          Staff Signature:  ____________________________ Date: ________  Time: _____

## 2024-03-15 NOTE — PLAN OF CARE
Discharge Note    Data:  Skye Baez discharged to [facility] at 1610 via bed. Accompanied by other:EMT, family and staff.    Action:  Written discharge/follow-up instructions were provided to  family and care facility . Prescriptions filled and sent with patient upon discharge. All belongings sent with patient.    Response:  Family verbalized understanding of discharge instructions, reason for discharge, and necessary follow-up appointments. Discharge packet sent with patient to [facility].     Renea Neff RN .......  3/15/2024  4:14 PM

## 2024-03-15 NOTE — PLAN OF CARE
"Pt on comfort cares, pt denies pain, CCRQ2 HR.  Pt has a mepilex on coccyx with non-blanchable redness in areas.  [facility] from [facility].     Goal Outcome Evaluation:      Plan of Care Reviewed With: patient    Overall Patient Progress: no changeOverall Patient Progress: no change      Problem: Adult Inpatient Plan of Care  Goal: Plan of Care Review  Description: The Plan of Care Review/Shift note should be completed every shift.  The Outcome Evaluation is a brief statement about your assessment that the patient is improving, declining, or no change.  This information will be displayed automatically on your shift  note.  Outcome: Progressing  Flowsheets (Taken 3/15/2024 0609)  Plan of Care Reviewed With: patient  Overall Patient Progress: no change  Goal: Patient-Specific Goal (Individualized)  Description: You can add care plan individualizations to a care plan. Examples of Individualization might be:  \"Parent requests to be called daily at 9am for status\", \"I have a hard time hearing out of my right ear\", or \"Do not touch me to wake me up as it startles  me\".  Outcome: Progressing  Flowsheets (Taken 3/15/2024 0609)  Individualized Care Needs: comfort cares  Anxieties, Fears or Concerns: none  Goal: Absence of Hospital-Acquired Illness or Injury  Outcome: Progressing  Intervention: Identify and Manage Fall Risk  Recent Flowsheet Documentation  Taken 3/15/2024 0354 by Nadege Narvaez RN  Safety Promotion/Fall Prevention:   activity supervised   clutter free environment maintained   nonskid shoes/slippers when out of bed   safety round/check completed   treat reversible contributory factors   treat underlying cause  Intervention: Prevent Skin Injury  Recent Flowsheet Documentation  Taken 3/15/2024 0354 by Nadege Narvaez RN  Body Position:   right   turned   heels elevated  Intervention: Prevent Infection  Recent Flowsheet Documentation  Taken 3/15/2024 0354 by Nadege Narvaez, RN  Infection Prevention:   " cohorting utilized   hand hygiene promoted   rest/sleep promoted   single patient room provided  Goal: Optimal Comfort and Wellbeing  Outcome: Progressing  Goal: Readiness for Transition of Care  Outcome: Progressing  Intervention: Mutually Develop Transition Plan  Recent Flowsheet Documentation  Taken 3/15/2024 0300 by Nadege Narvaez RN  Equipment Currently Used at Home:   walker, rolling   wheelchair, manual     Problem: Pain Acute  Goal: Optimal Pain Control and Function  Outcome: Progressing  Intervention: Prevent or Manage Pain  Recent Flowsheet Documentation  Taken 3/15/2024 0354 by Nadege Narvaez RN  Medication Review/Management: medications reviewed     Problem: Fall Injury Risk  Goal: Absence of Fall and Fall-Related Injury  Outcome: Progressing  Intervention: Identify and Manage Contributors  Recent Flowsheet Documentation  Taken 3/15/2024 0354 by Nadege Narvaez RN  Medication Review/Management: medications reviewed  Intervention: Promote Injury-Free Environment  Recent Flowsheet Documentation  Taken 3/15/2024 0354 by Nadege Narvaez RN  Safety Promotion/Fall Prevention:   activity supervised   clutter free environment maintained   nonskid shoes/slippers when out of bed   safety round/check completed   treat reversible contributory factors   treat underlying cause

## 2024-03-15 NOTE — H&P
[facility]    History and Physical - Hospitalist Service       Date of Admission:  3/14/2024    Assessment & Plan    Biliary obstruction  Possible cholangitis  -family not interested in aggressive measures; wants comfort care  -Hospice eval in AM; family would like her to return to care center with hospice    DELORIS  Anemia  HFpEF  Gout  hypothyroidism     Diet: Regular Diet Adult    DVT Prophylaxis: none/CMO  [place] Catheter: Not present  Lines: None     Code Status: No CPR- Do NOT Intubate      Clinically Significant Risk Factors Present on Admission      # Drug Induced Platelet Defect: home medication list includes an antiplatelet medication  # Acute Kidney Injury, unspecified: based on a >150% or 0.3 mg/dL increase in last creatinine compared to past 90 day average, will monitor renal function  # Hypertension: Home medication list includes antihypertensive(s)                 Disposition Plan      Expected Discharge Date: 03/17/2024                The patient's care was discussed with the Patient's Family.        DESHAUN RAMON MD  [facility]  Securely message with the Vocera Web Console (learn more here)  Text page via [facility] Paging/Directory      Visit/Communication Style   Virtual (Video) communication was used to evaluate Skye.  Skye consented to the use of video communication: yes  Video START time: , 3/15/2024  Video STOP time: , 3/15/2024   Patient's location: [facility]   Provider's location during the visit: [facility] Tele-medicine site        ______________________________________________________________________    Chief Complaint   weakness    History of Present Illness   89yoF with HTN, hypothyroidism, HfpEF, gout, anemia, and known biliary obstruction due to gallstones presented to the ED with weakness and fever.  She is confused but has no acute complaints.  Family is at bedside.  They have known about this biliary obstruction since  December and have been advised she is not a candidate for intervention.  They would like to pursue comfort measures at this time with the goal of her returning to the care center with hospice.    Review of Systems    Cannot obtain    Past Medical History    Gout  Hypothryoidism  HTN  HFpEF  gallstones    Past Surgical History   Cannot obtain    Social History   I have reviewed this patient's social history and updated it with pertinent information if needed.  Social History     Tobacco Use    Smoking status: Never     Passive exposure: Past    Smokeless tobacco: Never   Vaping Use    Vaping Use: Never used   Substance Use Topics    Alcohol use: Not Currently    Drug use: Never       Family History     Unable to obtain due to: patient confusion    Prior to Admission Medications   Prior to Admission Medications   Prescriptions Last Dose Informant Patient Reported? Taking?   Cholecalciferol (VITAMIN D3) 1.25 MG (34965 UT) TABS  Nursing Home Yes No   Sig: TAKE 1 CAPSULE (125MCG) BY MOUTH EVERY DAY   allopurinol (ZYLOPRIM) 100 MG tablet  Nursing Home Yes No   Sig: Take 200 mg by mouth daily   aspirin 81 MG EC tablet  Nursing Home Yes No   Sig: Take 81 mg by mouth daily   furosemide (LASIX) 20 MG tablet  Nursing Home Yes No   Sig: Take 20 mg by mouth daily   gabapentin (NEURONTIN) 100 MG capsule  Nursing Home No No   Sig: Take 2 capsules (200 mg) by mouth nightly as needed for neuropathic pain   levothyroxine (SYNTHROID/LEVOTHROID) 100 MCG tablet   No No   Sig: Take 1 tablet (100 mcg) by mouth daily   lisinopril (ZESTRIL) 20 MG tablet  Nursing Home Yes No   Sig: Take 20 mg by mouth daily   metoprolol succinate ER (TOPROL-XL) 25 MG 24 hr tablet  Nursing Home Yes No   Sig: Take 25 mg by mouth daily   pantoprazole (PROTONIX) 40 MG EC tablet  Nursing Home Yes No   Sig: Take 40 mg by mouth every morning      Facility-Administered Medications: None     Allergies   Allergies   Allergen Reactions    Iodine Unknown    Celecoxib  Hives    Codeine Hives    Penicillins Hives       Physical Exam   Vital Signs: Temp: 99.1  F (37.3  C) Temp src: Tympanic BP: (!) 79/45 Pulse: 80   Resp: 10 SpO2: 99 % O2 Device: None (Room air)    Weight: 0 lbs 0 oz    Gen:  jaundice; in no acute distress, lying semi-supine in hospital stretcher  HEENT:  hard of hearing;   Resp:  No accessory muscle use  Card:  normal rate  Abd:  Soft per RN exam  Musc:  Normal strength and movement of the major muscle groups without obvious deformity  Psych:   not anxious, not agitated; confused with awake and alert  Data     Recent Labs   Lab 03/14/24  2100   WBC 15.9*   HGB 8.6*      *      POTASSIUM 4.5   CHLORIDE 99   CO2 25   BUN 44.5*   CR 2.50*   ANIONGAP 12   AMADO 8.6   *   ALBUMIN 2.6*   PROTTOTAL 4.9*   BILITOTAL 3.9*   ALKPHOS 729*   ALT 50   *   LIPASE 14         Recent Results (from the past 24 hour(s))   XR Chest Port 1 View    Narrative    PROCEDURE INFORMATION:   Exam: XR Chest   Exam date and time: 3/14/2024 10:22 PM   Age: 89 years old   Clinical indication: Fever and other: Weakness; Additional info: Fever,   weakness     TECHNIQUE:   Imaging protocol: Radiologic exam of the chest.   Views: 1 view.     COMPARISON:   CR XR CHEST PORT 1 VIEW 1/20/2024 6:02 PM     FINDINGS:   Lungs: Chronic appearing diffuse pulmonary interstitial prominence and mild   right lower lobe infiltrate. No definite acute infiltrate.   Pleural spaces: Unremarkable. No pleural effusion. No pneumothorax.   Heart/Mediastinum: Unremarkable. No cardiomegaly.   Bones/joints: Significant diffuse osteopenia. Right shoulder prosthesis in   place. Old, healed left proximal humerus fracture.       Impression    IMPRESSION:   No acute disease.  Stable chronic appearing findings as noted    THIS DOCUMENT HAS BEEN ELECTRONICALLY SIGNED BY MELVIN VAZQUEZ MD   CT Abdomen Pelvis w/o Contrast    Narrative    PROCEDURE INFORMATION:   Exam: CT Abdomen And Pelvis Without  Contrast   Exam date and time: 3/14/2024 11:47 PM   Age: 89 years old   Clinical indication: Abnormal findings; Abnormal lab test; Elevated liver   enzymes; Fever; Additional info: Elevated liver enzymes, fever     TECHNIQUE:   Imaging protocol: Computed tomography of the abdomen and pelvis without   contrast.   Radiation optimization: All CT scans at this facility use at least one of these   dose optimization techniques: automated exposure control; mA and/or kV   adjustment per patient size (includes targeted exams where dose is matched to   clinical indication); or iterative reconstruction.     COMPARISON:   CT ABDOMEN PELVIS W/O CONTRAST 7/30/2022 1:53 PM     FINDINGS:   Lungs: Dense partial consolidative atelectasis of the right lower lobe.   Pleural spaces: Partially visualized right pleural effusion.     Liver: Normal. No mass.   Gallbladder and bile ducts: Significant diffuse biliary ductal dilation. Two   distinct hyperdense lesions compatible with stones in the distal common duct,   each measuring 10-12 mm in diameter. A few other more ill-defined areas of   hyperdensity in the mid to upper common duct may represent stones as well.   Gallbladder is surgically absent.   Pancreas: Normal. No ductal dilation.   Spleen: Normal. No splenomegaly.   Adrenal glands: Normal. No mass.   Kidneys and ureters: Moderate right renal atrophy. Left kidney appears normal.   No evidence of urolithiasis or hydronephrosis.   Stomach and bowel: Moderate sigmoid diverticulosis. No bowel wall thickening or   evidence of bowel obstruction.   Appendix: No evidence of appendicitis.     Intraperitoneal space: Unremarkable. No free air. No significant fluid   collection.   Vasculature: Dense atherosclerotic calcification throughout the aorta and iliac   arteries. No evidence of aneurysm.   Lymph nodes: Unremarkable. No enlarged lymph nodes.   Urinary bladder: Unremarkable as visualized.   Reproductive: Unremarkable as visualized.    Bones/joints: Age-indeterminate moderate compression fracture of L2 has   occurred since the prior study of 07/30/2022. Bilateral spondylolysis and grade   1 anterolisthesis of L5. Multilevel degenerative disc change and facet   arthropathy, most severe at the lumbosacral junction.   Soft tissues: Unremarkable.       Impression    IMPRESSION:   1. Significant diffuse biliary ductal dilation apparently due to multiple large   gallstones in the distal common bile duct.  Biliary neoplasm and cholangitis   not excluded.  2.  Right pleural effusion and right lower lobe consolidative atelectasis   3. Age-indeterminate compression fracture of L2, new since 2022.  Other   significant chronic osseous and atherosclerotic changes as noted.    THIS DOCUMENT HAS BEEN ELECTRONICALLY SIGNED BY MELVIN VAZQUEZ MD

## 2024-03-18 NOTE — TELEPHONE ENCOUNTER
Patient has PCP elsewhere, no follow-up here. No TCM call required per policy.  Transitional Care Management    Patient discharged with orders for hospice. Hospice was admitting within 48 hours. No TCM call required per policy.   Nely Rush RN on 3/18/2024 at 7:51 AM

## 2024-03-19 LAB
ATRIAL RATE - MUSE: 82 BPM
DIASTOLIC BLOOD PRESSURE - MUSE: NORMAL MMHG
INTERPRETATION ECG - MUSE: NORMAL
P AXIS - MUSE: NORMAL DEGREES
PR INTERVAL - MUSE: NORMAL MS
QRS DURATION - MUSE: 80 MS
QT - MUSE: 370 MS
QTC - MUSE: 426 MS
R AXIS - MUSE: 21 DEGREES
SYSTOLIC BLOOD PRESSURE - MUSE: NORMAL MMHG
T AXIS - MUSE: 36 DEGREES
VENTRICULAR RATE- MUSE: 80 BPM

## (undated) RX ORDER — CEFTRIAXONE SODIUM 1 G
VIAL (EA) INJECTION
Status: DISPENSED
Start: 2023-01-01

## (undated) RX ORDER — MAGNESIUM SULFATE HEPTAHYDRATE 40 MG/ML
INJECTION, SOLUTION INTRAVENOUS
Status: DISPENSED
Start: 2024-01-01

## (undated) RX ORDER — GABAPENTIN 100 MG/1
CAPSULE ORAL
Status: DISPENSED
Start: 2023-01-01

## (undated) RX ORDER — LEVOFLOXACIN 5 MG/ML
INJECTION, SOLUTION INTRAVENOUS
Status: DISPENSED
Start: 2024-01-01

## (undated) RX ORDER — CEFEPIME HYDROCHLORIDE 1 G/1
INJECTION, POWDER, FOR SOLUTION INTRAMUSCULAR; INTRAVENOUS
Status: DISPENSED
Start: 2024-01-01

## (undated) RX ORDER — CEFTRIAXONE SODIUM 1 G/50ML
INJECTION, SOLUTION INTRAVENOUS
Status: DISPENSED
Start: 2022-07-23